# Patient Record
Sex: FEMALE | Race: WHITE | Employment: FULL TIME | URBAN - METROPOLITAN AREA
[De-identification: names, ages, dates, MRNs, and addresses within clinical notes are randomized per-mention and may not be internally consistent; named-entity substitution may affect disease eponyms.]

---

## 2017-01-23 RX ORDER — LISINOPRIL AND HYDROCHLOROTHIAZIDE 25; 20 MG/1; MG/1
1 TABLET ORAL DAILY
Qty: 90 TABLET | Refills: 1 | Status: SHIPPED | OUTPATIENT
Start: 2017-01-23 | End: 2017-01-30 | Stop reason: SDUPTHER

## 2017-01-30 DIAGNOSIS — K21.9 GASTROESOPHAGEAL REFLUX DISEASE, ESOPHAGITIS PRESENCE NOT SPECIFIED: ICD-10-CM

## 2017-01-31 RX ORDER — LISINOPRIL AND HYDROCHLOROTHIAZIDE 25; 20 MG/1; MG/1
1 TABLET ORAL DAILY
Qty: 90 TABLET | Refills: 1 | Status: SHIPPED | OUTPATIENT
Start: 2017-01-31 | End: 2017-08-02 | Stop reason: SDUPTHER

## 2017-01-31 RX ORDER — OMEPRAZOLE 20 MG/1
20 CAPSULE, DELAYED RELEASE ORAL
Qty: 90 CAPSULE | Refills: 1 | Status: SHIPPED | OUTPATIENT
Start: 2017-01-31 | End: 2017-06-13 | Stop reason: ALTCHOICE

## 2017-03-16 RX ORDER — GABAPENTIN 400 MG/1
400 CAPSULE ORAL 3 TIMES DAILY
Qty: 270 CAPSULE | Refills: 2 | Status: SHIPPED | OUTPATIENT
Start: 2017-03-16 | End: 2018-01-15 | Stop reason: SDUPTHER

## 2017-06-13 ENCOUNTER — OFFICE VISIT (OUTPATIENT)
Dept: FAMILY MEDICINE CLINIC | Age: 57
End: 2017-06-13
Payer: COMMERCIAL

## 2017-06-13 VITALS — DIASTOLIC BLOOD PRESSURE: 78 MMHG | HEART RATE: 84 BPM | HEIGHT: 67 IN | SYSTOLIC BLOOD PRESSURE: 132 MMHG

## 2017-06-13 DIAGNOSIS — M79.7 FIBROMYALGIA: ICD-10-CM

## 2017-06-13 DIAGNOSIS — I10 ESSENTIAL HYPERTENSION: Primary | ICD-10-CM

## 2017-06-13 PROCEDURE — 99213 OFFICE O/P EST LOW 20 MIN: CPT | Performed by: FAMILY MEDICINE

## 2017-06-13 PROCEDURE — 3017F COLORECTAL CA SCREEN DOC REV: CPT | Performed by: FAMILY MEDICINE

## 2017-06-13 PROCEDURE — 1036F TOBACCO NON-USER: CPT | Performed by: FAMILY MEDICINE

## 2017-06-13 PROCEDURE — G8421 BMI NOT CALCULATED: HCPCS | Performed by: FAMILY MEDICINE

## 2017-06-13 PROCEDURE — G8427 DOCREV CUR MEDS BY ELIG CLIN: HCPCS | Performed by: FAMILY MEDICINE

## 2017-06-13 PROCEDURE — 3014F SCREEN MAMMO DOC REV: CPT | Performed by: FAMILY MEDICINE

## 2017-06-13 RX ORDER — TOBRAMYCIN AND DEXAMETHASONE 3; 1 MG/ML; MG/ML
SUSPENSION/ DROPS OPHTHALMIC
Refills: 0 | COMMUNITY
Start: 2017-05-09 | End: 2017-12-19 | Stop reason: ALTCHOICE

## 2017-06-13 RX ORDER — OMEPRAZOLE 20 MG/1
20 CAPSULE, DELAYED RELEASE ORAL DAILY
COMMUNITY
End: 2017-08-02 | Stop reason: SDUPTHER

## 2017-06-13 ASSESSMENT — ENCOUNTER SYMPTOMS
SHORTNESS OF BREATH: 1
DIARRHEA: 0
BACK PAIN: 1
RHINORRHEA: 0
CONSTIPATION: 0
SORE THROAT: 0
ABDOMINAL PAIN: 1
COUGH: 0

## 2017-08-03 RX ORDER — LISINOPRIL AND HYDROCHLOROTHIAZIDE 25; 20 MG/1; MG/1
1 TABLET ORAL DAILY
Qty: 90 TABLET | Refills: 1 | Status: SHIPPED | OUTPATIENT
Start: 2017-08-03 | End: 2018-02-07 | Stop reason: SDUPTHER

## 2017-08-03 RX ORDER — OMEPRAZOLE 20 MG/1
20 CAPSULE, DELAYED RELEASE ORAL DAILY
Qty: 90 CAPSULE | Refills: 1 | Status: SHIPPED | OUTPATIENT
Start: 2017-08-03 | End: 2018-02-15 | Stop reason: SDUPTHER

## 2017-08-17 RX ORDER — AMITRIPTYLINE HYDROCHLORIDE 75 MG/1
75 TABLET, FILM COATED ORAL NIGHTLY
Qty: 90 TABLET | Refills: 0 | Status: SHIPPED | OUTPATIENT
Start: 2017-08-17 | End: 2017-12-19 | Stop reason: DRUGHIGH

## 2017-12-19 ENCOUNTER — OFFICE VISIT (OUTPATIENT)
Dept: FAMILY MEDICINE CLINIC | Age: 57
End: 2017-12-19
Payer: COMMERCIAL

## 2017-12-19 VITALS — DIASTOLIC BLOOD PRESSURE: 86 MMHG | HEART RATE: 82 BPM | OXYGEN SATURATION: 96 % | SYSTOLIC BLOOD PRESSURE: 136 MMHG

## 2017-12-19 DIAGNOSIS — T14.8XXA SUPERFICIAL HEMATOMA: ICD-10-CM

## 2017-12-19 DIAGNOSIS — I10 ESSENTIAL HYPERTENSION: Primary | ICD-10-CM

## 2017-12-19 DIAGNOSIS — M79.7 FIBROMYALGIA: ICD-10-CM

## 2017-12-19 PROCEDURE — G8427 DOCREV CUR MEDS BY ELIG CLIN: HCPCS | Performed by: FAMILY MEDICINE

## 2017-12-19 PROCEDURE — 99213 OFFICE O/P EST LOW 20 MIN: CPT | Performed by: FAMILY MEDICINE

## 2017-12-19 PROCEDURE — 3017F COLORECTAL CA SCREEN DOC REV: CPT | Performed by: FAMILY MEDICINE

## 2017-12-19 PROCEDURE — 3014F SCREEN MAMMO DOC REV: CPT | Performed by: FAMILY MEDICINE

## 2017-12-19 PROCEDURE — G8484 FLU IMMUNIZE NO ADMIN: HCPCS | Performed by: FAMILY MEDICINE

## 2017-12-19 PROCEDURE — G8421 BMI NOT CALCULATED: HCPCS | Performed by: FAMILY MEDICINE

## 2017-12-19 PROCEDURE — 1036F TOBACCO NON-USER: CPT | Performed by: FAMILY MEDICINE

## 2017-12-19 RX ORDER — CLINDAMYCIN HYDROCHLORIDE 150 MG/1
CAPSULE ORAL
Refills: 0 | COMMUNITY
Start: 2017-10-12 | End: 2018-04-30 | Stop reason: ALTCHOICE

## 2017-12-19 RX ORDER — AMITRIPTYLINE HYDROCHLORIDE 100 MG/1
100 TABLET, FILM COATED ORAL NIGHTLY
Qty: 90 TABLET | Refills: 1 | Status: SHIPPED | OUTPATIENT
Start: 2017-12-19 | End: 2018-05-17 | Stop reason: SDUPTHER

## 2017-12-19 ASSESSMENT — ENCOUNTER SYMPTOMS
SHORTNESS OF BREATH: 1
ABDOMINAL PAIN: 0
NAUSEA: 0
DIARRHEA: 0
SORE THROAT: 0
CONSTIPATION: 0
RHINORRHEA: 0
BACK PAIN: 1
COUGH: 0

## 2017-12-19 NOTE — PROGRESS NOTES
peña with judy Caicedo received counseling on the following healthy behaviors: medication adherence  Reviewed prior labs and health maintenance  Continue current medications, diet and exercise. Discussed use, benefit, and side effects of prescribed medications. Barriers to medication compliance addressed. Patient given educational materials - see patient instructions  Was a self-tracking handout given in paper form or via [employer]? No:     Requested Prescriptions     Signed Prescriptions Disp Refills    amitriptyline (ELAVIL) 100 MG tablet 90 tablet 1     Sig: Take 1 tablet by mouth nightly       All patient questions answered. Patient voiced understanding. Quality Measures    There is no height or weight on file to calculate BMI. Elevated. Weight control planned discussed Healthy diet and regular exercise. BP: 136/86 Blood pressure is normal. Treatment plan consists of No treatment change needed.     Lab Results   Component Value Date    LDLCHOLESTEROL 110 06/12/2015    (goal LDL reduction with dx if diabetes is 50% LDL reduction)      PHQ Scores 12/6/2016   PHQ2 Score 2   PHQ9 Score 2     Interpretation of Total Score Depression Severity: 1-4 = Minimal depression, 5-9 = Mild depression, 10-14 = Moderate depression, 15-19 = Moderately severe depression, 20-27 = Severe depression

## 2018-01-15 RX ORDER — GABAPENTIN 400 MG/1
400 CAPSULE ORAL 3 TIMES DAILY
Qty: 270 CAPSULE | Refills: 2 | Status: SHIPPED | OUTPATIENT
Start: 2018-01-15 | End: 2018-01-16 | Stop reason: SDUPTHER

## 2018-01-17 RX ORDER — GABAPENTIN 400 MG/1
400 CAPSULE ORAL 3 TIMES DAILY
Qty: 270 CAPSULE | Refills: 2 | Status: SHIPPED | OUTPATIENT
Start: 2018-01-17 | End: 2018-11-12 | Stop reason: SDUPTHER

## 2018-02-07 RX ORDER — LISINOPRIL AND HYDROCHLOROTHIAZIDE 25; 20 MG/1; MG/1
1 TABLET ORAL DAILY
Qty: 90 TABLET | Refills: 1 | Status: SHIPPED | OUTPATIENT
Start: 2018-02-07 | End: 2018-07-05 | Stop reason: SDUPTHER

## 2018-02-07 NOTE — TELEPHONE ENCOUNTER
Lisinopril 20-25 mg    Mail order to [facility]    Next Visit Date:  Future Appointments  Date Time Provider Kya Trimble   7/5/2018 1:30 PM [facility]   Topic Date Due    DTaP/Tdap/Td vaccine (1 - Tdap) 04/15/1979    Potassium monitoring  06/06/2017    Creatinine monitoring  06/06/2017    Cervical cancer screen  09/15/2017    Flu vaccine (1) 12/18/2018 (Originally 9/1/2017)    Breast cancer screen  05/10/2018    Lipid screen  06/12/2020    Colon cancer screen colonoscopy  04/05/2022    Hepatitis C screen  Addressed    HIV screen  Addressed       Hemoglobin A1C (%)   Date Value   01/04/2016 5.4             ( goal A1C is < 7)   No results found for: LABMICR  LDL Cholesterol (mg/dL)   Date Value   06/12/2015 110       (goal LDL is <100)   AST (U/L)   Date Value   06/06/2016 22.0     ALT (U/L)   Date Value   06/06/2016 37.0     BUN (mg/dL)   Date Value   06/06/2016 25.0     BP Readings from Last 3 Encounters:   12/19/17 136/86   06/13/17 132/78   12/06/16 136/82          (goal 120/80)    All Future Testing planned in [facility]  Lab Frequency Next Occurrence               Patient Active Problem List:     Fibromyalgia     HTN (hypertension)     Follicular adenoma of thyroid gland     S/P laparoscopic cholecystectomy     Lower urinary tract infectious disease     Right hip pain     Bilateral leg edema     Chronic fatigue     Left thyroid nodule     Chronic back pain greater than 3 months duration     Shortness of breath     Pulmonary hypertension associated with unclear multi-factorial mechanisms     Morbid obesity due to excess calories (HCC)     MAYURI (obstructive sleep apnea)     Essential hypertension     Coxitis     History of total hip replacement

## 2018-02-15 RX ORDER — OMEPRAZOLE 20 MG/1
20 CAPSULE, DELAYED RELEASE ORAL DAILY
Qty: 90 CAPSULE | Refills: 1 | Status: SHIPPED | OUTPATIENT
Start: 2018-02-15 | End: 2018-07-05 | Stop reason: SDUPTHER

## 2018-02-15 NOTE — TELEPHONE ENCOUNTER
Patient asking for a new script for Omeprazole - patient uses [place] Maintenance   Topic Date Due    DTaP/Tdap/Td vaccine (1 - Tdap) 04/15/1979    Potassium monitoring  06/06/2017    Creatinine monitoring  06/06/2017    Cervical cancer screen  09/15/2017    Flu vaccine (1) 12/18/2018 (Originally 9/1/2017)    Breast cancer screen  05/10/2018    Lipid screen  06/12/2020    Colon cancer screen colonoscopy  04/05/2022    Hepatitis C screen  Addressed    HIV screen  Addressed             (applicable per patient's age: Cancer Screenings, Depression Screening, Fall Risk Screening, Immunizations)    Hemoglobin A1C (%)   Date Value   01/04/2016 5.4     LDL Cholesterol (mg/dL)   Date Value   06/12/2015 110     AST (U/L)   Date Value   06/06/2016 22.0     ALT (U/L)   Date Value   06/06/2016 37.0     BUN (mg/dL)   Date Value   06/06/2016 25.0      (goal A1C is < 7)   (goal LDL is <100) need 30-50% reduction from baseline     BP Readings from Last 3 Encounters:   12/19/17 136/86   06/13/17 132/78   12/06/16 136/82    (goal /80)      All Future Testing planned in [facility]:  Lab Frequency Next Occurrence       Next Visit Date:  Future Appointments  Date Time Provider Kya Trimble   7/5/2018 1:30 PM DO JOSAFAT [facility]            Patient Active Problem List:     Fibromyalgia     HTN (hypertension)     Follicular adenoma of thyroid gland     S/P laparoscopic cholecystectomy     Lower urinary tract infectious disease     Right hip pain     Bilateral leg edema     Chronic fatigue     Left thyroid nodule     Chronic back pain greater than 3 months duration     Shortness of breath     Pulmonary hypertension associated with unclear multi-factorial mechanisms     Morbid obesity due to excess calories (HCC)     MAYURI (obstructive sleep apnea)     Essential hypertension     Coxitis     History of total hip replacement

## 2018-04-30 ENCOUNTER — OFFICE VISIT (OUTPATIENT)
Dept: FAMILY MEDICINE CLINIC | Age: 58
End: 2018-04-30
Payer: COMMERCIAL

## 2018-04-30 ENCOUNTER — HOSPITAL ENCOUNTER (OUTPATIENT)
Age: 58
Discharge: HOME | End: 2018-04-30
Payer: COMMERCIAL

## 2018-04-30 VITALS
HEART RATE: 68 BPM | OXYGEN SATURATION: 98 % | DIASTOLIC BLOOD PRESSURE: 70 MMHG | SYSTOLIC BLOOD PRESSURE: 110 MMHG | TEMPERATURE: 97.6 F

## 2018-04-30 DIAGNOSIS — R00.2 HEART PALPITATIONS: ICD-10-CM

## 2018-04-30 DIAGNOSIS — R00.2 HEART PALPITATIONS: Primary | ICD-10-CM

## 2018-04-30 LAB
ABSOLUTE EOS #: 0 K/UL (ref 0–0.4)
ABSOLUTE IMMATURE GRANULOCYTE: ABNORMAL K/UL (ref 0–0.3)
ABSOLUTE LYMPH #: 1.7 K/UL (ref 1–4.8)
ABSOLUTE MONO #: 0.6 K/UL (ref 0–1)
ALBUMIN SERPL-MCNC: 3.9 G/DL (ref 3.5–5.2)
ALBUMIN/GLOBULIN RATIO: ABNORMAL (ref 1–2.5)
ALP BLD-CCNC: 113 U/L (ref 35–104)
ALT SERPL-CCNC: 17 U/L (ref 5–33)
ANION GAP SERPL CALCULATED.3IONS-SCNC: 12 MMOL/L (ref 9–17)
AST SERPL-CCNC: 19 U/L
BASOPHILS # BLD: 0 % (ref 0–2)
BASOPHILS ABSOLUTE: 0 K/UL (ref 0–0.2)
BILIRUB SERPL-MCNC: 0.2 MG/DL (ref 0.3–1.2)
BUN BLDV-MCNC: 17 MG/DL (ref 6–20)
BUN/CREAT BLD: 18 (ref 9–20)
CALCIUM SERPL-MCNC: 9.4 MG/DL (ref 8.6–10.4)
CHLORIDE BLD-SCNC: 99 MMOL/L (ref 98–107)
CO2: 27 MMOL/L (ref 20–31)
CREAT SERPL-MCNC: 0.96 MG/DL (ref 0.5–0.9)
DIFFERENTIAL TYPE: YES
EOSINOPHILS RELATIVE PERCENT: 0 % (ref 0–5)
GFR AFRICAN AMERICAN: >60 ML/MIN
GFR NON-AFRICAN AMERICAN: 60 ML/MIN
GFR SERPL CREATININE-BSD FRML MDRD: ABNORMAL ML/MIN/{1.73_M2}
GFR SERPL CREATININE-BSD FRML MDRD: ABNORMAL ML/MIN/{1.73_M2}
GLUCOSE BLD-MCNC: 100 MG/DL (ref 70–99)
HCT VFR BLD CALC: 38.8 % (ref 36–46)
HEMOGLOBIN: 12.7 G/DL (ref 12–16)
IMMATURE GRANULOCYTES: ABNORMAL %
LYMPHOCYTES # BLD: 24 % (ref 15–40)
MCH RBC QN AUTO: 26.7 PG (ref 26–34)
MCHC RBC AUTO-ENTMCNC: 32.7 G/DL (ref 31–37)
MCV RBC AUTO: 81.7 FL (ref 80–100)
MONOCYTES # BLD: 9 % (ref 4–8)
NRBC AUTOMATED: ABNORMAL PER 100 WBC
PDW BLD-RTO: 16.5 % (ref 12.1–15.2)
PLATELET # BLD: 298 K/UL (ref 140–450)
PLATELET ESTIMATE: ABNORMAL
PMV BLD AUTO: ABNORMAL FL (ref 6–12)
POTASSIUM SERPL-SCNC: 4.3 MMOL/L (ref 3.7–5.3)
RBC # BLD: 4.75 M/UL (ref 4–5.2)
RBC # BLD: ABNORMAL 10*6/UL
SEG NEUTROPHILS: 67 % (ref 47–75)
SEGMENTED NEUTROPHILS ABSOLUTE COUNT: 4.8 K/UL (ref 2.5–7)
SODIUM BLD-SCNC: 138 MMOL/L (ref 135–144)
TOTAL PROTEIN: 6.8 G/DL (ref 6.4–8.3)
TSH SERPL DL<=0.05 MIU/L-ACNC: 2.21 MIU/L (ref 0.3–5)
WBC # BLD: 7.2 K/UL (ref 3.5–11)
WBC # BLD: ABNORMAL 10*3/UL

## 2018-04-30 PROCEDURE — 84443 ASSAY THYROID STIM HORMONE: CPT

## 2018-04-30 PROCEDURE — 85025 COMPLETE CBC W/AUTO DIFF WBC: CPT

## 2018-04-30 PROCEDURE — 99213 OFFICE O/P EST LOW 20 MIN: CPT | Performed by: NURSE PRACTITIONER

## 2018-04-30 PROCEDURE — G8427 DOCREV CUR MEDS BY ELIG CLIN: HCPCS | Performed by: NURSE PRACTITIONER

## 2018-04-30 PROCEDURE — 80053 COMPREHEN METABOLIC PANEL: CPT

## 2018-04-30 PROCEDURE — 36415 COLL VENOUS BLD VENIPUNCTURE: CPT

## 2018-04-30 PROCEDURE — 1036F TOBACCO NON-USER: CPT | Performed by: NURSE PRACTITIONER

## 2018-04-30 PROCEDURE — 3017F COLORECTAL CA SCREEN DOC REV: CPT | Performed by: NURSE PRACTITIONER

## 2018-04-30 PROCEDURE — G8421 BMI NOT CALCULATED: HCPCS | Performed by: NURSE PRACTITIONER

## 2018-04-30 ASSESSMENT — ENCOUNTER SYMPTOMS
VOMITING: 0
DIARRHEA: 0
SHORTNESS OF BREATH: 0
NAUSEA: 0
COUGH: 0

## 2018-04-30 ASSESSMENT — PATIENT HEALTH QUESTIONNAIRE - PHQ9
1. LITTLE INTEREST OR PLEASURE IN DOING THINGS: 0
2. FEELING DOWN, DEPRESSED OR HOPELESS: 0
SUM OF ALL RESPONSES TO PHQ9 QUESTIONS 1 & 2: 0
SUM OF ALL RESPONSES TO PHQ QUESTIONS 1-9: 0

## 2018-05-03 ENCOUNTER — HOSPITAL ENCOUNTER (OUTPATIENT)
Dept: NON INVASIVE DIAGNOSTICS | Age: 58
Discharge: HOME | End: 2018-05-03
Payer: COMMERCIAL

## 2018-05-03 DIAGNOSIS — R00.2 HEART PALPITATIONS: ICD-10-CM

## 2018-05-03 PROCEDURE — 93270 REMOTE 30 DAY ECG REV/REPORT: CPT

## 2018-05-17 ENCOUNTER — TELEPHONE (OUTPATIENT)
Dept: FAMILY MEDICINE CLINIC | Age: 58
End: 2018-05-17

## 2018-05-17 RX ORDER — AMITRIPTYLINE HYDROCHLORIDE 100 MG/1
100 TABLET, FILM COATED ORAL NIGHTLY
Qty: 90 TABLET | Refills: 0 | Status: SHIPPED | OUTPATIENT
Start: 2018-05-17 | End: 2018-07-05 | Stop reason: SDUPTHER

## 2018-05-18 LAB
BUN BLDV-MCNC: 17 MG/DL
CALCIUM SERPL-MCNC: 9.3 MG/DL
CHLORIDE BLD-SCNC: 101 MMOL/L
CHOLESTEROL, TOTAL: 190 MG/DL
CHOLESTEROL/HDL RATIO: 3.7
CO2: 27.1 MMOL/L
CREAT SERPL-MCNC: 1.09 MG/DL
GFR CALCULATED: >60
GLUCOSE BLD-MCNC: 100 MG/DL
HDLC SERPL-MCNC: 52 MG/DL (ref 35–70)
LDL CHOLESTEROL CALCULATED: 113 MG/DL (ref 0–160)
POTASSIUM SERPL-SCNC: 4.6 MMOL/L
SODIUM BLD-SCNC: 139 MMOL/L
TRIGL SERPL-MCNC: 127 MG/DL
VLDLC SERPL CALC-MCNC: 25 MG/DL

## 2018-07-05 ENCOUNTER — OFFICE VISIT (OUTPATIENT)
Dept: FAMILY MEDICINE CLINIC | Age: 58
End: 2018-07-05
Payer: COMMERCIAL

## 2018-07-05 VITALS
DIASTOLIC BLOOD PRESSURE: 80 MMHG | SYSTOLIC BLOOD PRESSURE: 114 MMHG | HEART RATE: 88 BPM | OXYGEN SATURATION: 97 % | TEMPERATURE: 97.7 F

## 2018-07-05 DIAGNOSIS — K21.9 GASTROESOPHAGEAL REFLUX DISEASE WITHOUT ESOPHAGITIS: ICD-10-CM

## 2018-07-05 DIAGNOSIS — M79.7 FIBROMYALGIA: ICD-10-CM

## 2018-07-05 DIAGNOSIS — I10 ESSENTIAL HYPERTENSION: Primary | ICD-10-CM

## 2018-07-05 PROCEDURE — 99214 OFFICE O/P EST MOD 30 MIN: CPT | Performed by: NURSE PRACTITIONER

## 2018-07-05 PROCEDURE — 1036F TOBACCO NON-USER: CPT | Performed by: NURSE PRACTITIONER

## 2018-07-05 PROCEDURE — G8427 DOCREV CUR MEDS BY ELIG CLIN: HCPCS | Performed by: NURSE PRACTITIONER

## 2018-07-05 PROCEDURE — G8421 BMI NOT CALCULATED: HCPCS | Performed by: NURSE PRACTITIONER

## 2018-07-05 PROCEDURE — 3017F COLORECTAL CA SCREEN DOC REV: CPT | Performed by: NURSE PRACTITIONER

## 2018-07-05 RX ORDER — OMEPRAZOLE 20 MG/1
20 CAPSULE, DELAYED RELEASE ORAL DAILY
Qty: 90 CAPSULE | Refills: 1 | Status: SHIPPED | OUTPATIENT
Start: 2018-07-05 | End: 2019-01-03 | Stop reason: SDUPTHER

## 2018-07-05 RX ORDER — LISINOPRIL AND HYDROCHLOROTHIAZIDE 25; 20 MG/1; MG/1
1 TABLET ORAL DAILY
Qty: 90 TABLET | Refills: 1 | Status: SHIPPED | OUTPATIENT
Start: 2018-07-05 | End: 2019-01-03 | Stop reason: SDUPTHER

## 2018-07-05 RX ORDER — AMITRIPTYLINE HYDROCHLORIDE 100 MG/1
100 TABLET, FILM COATED ORAL NIGHTLY
Qty: 90 TABLET | Refills: 1 | Status: SHIPPED | OUTPATIENT
Start: 2018-07-05 | End: 2019-01-03 | Stop reason: SDUPTHER

## 2018-07-05 RX ORDER — MELOXICAM 15 MG/1
15 TABLET ORAL DAILY
Qty: 30 TABLET | Refills: 3 | Status: SHIPPED | OUTPATIENT
Start: 2018-07-05 | End: 2019-04-11 | Stop reason: ALTCHOICE

## 2018-07-05 ASSESSMENT — ENCOUNTER SYMPTOMS
HEARTBURN: 0
BLURRED VISION: 0
SHORTNESS OF BREATH: 1
ABDOMINAL PAIN: 0
SORE THROAT: 0

## 2018-07-05 NOTE — PROGRESS NOTES
HPI Notes    Name: Ashlee Agudelo  : 1960         Chief Complaint:     Chief Complaint   Patient presents with    Hypertension     Patient here for check up    Muscle Pain     Patient here today for fibromyalgia    Gastroesophageal Reflux       History of Present Illness:        Hypertension   This is a chronic problem. The current episode started more than 1 year ago. The problem has been gradually improving since onset. The problem is controlled. Associated symptoms include peripheral edema and shortness of breath. Pertinent negatives include no blurred vision or chest pain. Risk factors for coronary artery disease include dyslipidemia, obesity, post-menopausal state and sedentary lifestyle. Past treatments include ACE inhibitors and diuretics. Compliance problems include exercise and diet. Muscle Pain   This is a chronic problem. The current episode started more than 1 year ago. The problem occurs daily. The problem has been waxing and waning since onset. The pain is present in the left lower leg, left upper leg, right lower leg and right upper leg. The pain is medium. The symptoms are aggravated by any movement. Associated symptoms include fatigue, stiffness and shortness of breath. Pertinent negatives include no abdominal pain or chest pain. Treatments tried: gabapentin. The treatment provided mild relief. Gastroesophageal Reflux   She reports no abdominal pain, no chest pain, no heartburn or no sore throat. The current episode started more than 1 year ago. The problem has been gradually improving. The symptoms are aggravated by certain foods. Associated symptoms include fatigue. Risk factors include caffeine use, lack of exercise and obesity. She has tried a PPI for the symptoms. The treatment provided significant relief.        Past Medical History:     Past Medical History:   Diagnosis Date    Acid reflux     Bleeding tendency ([address]     Blood type O+     Bronchitis     Chronic back pain Disease Brother     Atrial Fibrillation Brother     Atrial Fibrillation Mother        Review of Systems:         Review of Systems   Constitutional: Positive for fatigue. HENT: Negative for sore throat. Eyes: Negative for blurred vision. Respiratory: Positive for shortness of breath. Cardiovascular: Negative for chest pain. Gastrointestinal: Negative for abdominal pain and heartburn. Musculoskeletal: Positive for stiffness. Physical Exam:     Vitals:  /80   Pulse 88   Temp 97.7 °F (36.5 °C) (Oral)   SpO2 97%       Physical Exam   Constitutional: She is oriented to person, place, and time. Vital signs are normal. She appears well-developed and well-nourished. HENT:   Head: Normocephalic. Right Ear: Hearing, tympanic membrane and external ear normal.   Left Ear: Hearing, tympanic membrane and external ear normal.   Nose: Nose normal.   Mouth/Throat: Uvula is midline, oropharynx is clear and moist and mucous membranes are normal.   Eyes: Conjunctivae and EOM are normal. Pupils are equal, round, and reactive to light. Neck: Trachea normal and normal range of motion. Carotid bruit is not present. No thyroid mass present. Cardiovascular: Normal rate, regular rhythm, S1 normal, S2 normal, normal heart sounds and intact distal pulses. Pulses:       Dorsalis pedis pulses are 2+ on the right side, and 2+ on the left side. 2+ BLE edema   Pulmonary/Chest: Effort normal and breath sounds normal.   Abdominal: Soft. Normal appearance. There is no tenderness. Musculoskeletal: Normal range of motion. Neurological: She is alert and oriented to person, place, and time. She has normal strength and normal reflexes. GCS eye subscore is 4. GCS verbal subscore is 5. GCS motor subscore is 6. Skin: Skin is warm, dry and intact. No rash noted. Psychiatric: She has a normal mood and affect.  Her speech is normal and behavior is normal. Judgment and thought content normal. Cognition and memory are normal.   Nursing note and vitals reviewed. Data:     Lab Results   Component Value Date     05/18/2018    K 4.6 05/18/2018     05/18/2018    CO2 27.1 05/18/2018    BUN 17 05/18/2018    CREATININE 1.09 05/18/2018    GLUCOSE 100 05/18/2018    PROT 6.8 04/30/2018    LABALBU 3.9 04/30/2018    BILITOT 0.20 04/30/2018    ALKPHOS 113 04/30/2018    AST 19 04/30/2018    ALT 17 04/30/2018     Lab Results   Component Value Date    WBC 7.2 04/30/2018    RBC 4.75 04/30/2018    RBC 4.17 05/15/2012    HGB 12.7 04/30/2018    HCT 38.8 04/30/2018    MCV 81.7 04/30/2018    MCH 26.7 04/30/2018    MCHC 32.7 04/30/2018    RDW 16.5 04/30/2018     04/30/2018     05/15/2012    MPV NOT REPORTED 04/30/2018     Lab Results   Component Value Date    TSH 2.21 04/30/2018     Lab Results   Component Value Date    CHOL 190 05/18/2018    HDL 52 05/18/2018    LABA1C 5.4 01/04/2016          Assessment & Plan        Diagnosis Orders   1. Essential hypertension  --well controlled at this time. Continue current meds     2. Fibromyalgia   --pt having some increased pain in multiple muscles. Pt encouraged to do some exercise to decrease pain, especially water exercises. 3. Gastroesophageal reflux disease without esophagitis   --well controlled at this time. Continue to modify diet and identify food triggers      Patient verbalizes understanding and agreement with plan. All questions answered. If symptoms do not resolve or worsen, return to office.      Face to face time > 25 minutes    Greater than 50% of time was spent counseling pt regarding disease process and medications                  Completed Refills   Requested Prescriptions     Signed Prescriptions Disp Refills    lisinopril-hydrochlorothiazide (ZESTORETIC) 20-25 MG per tablet 90 tablet 1     Sig: Take 1 tablet by mouth daily    omeprazole (PRILOSEC) 20 MG delayed release capsule 90 capsule 1     Sig: Take 1 capsule by mouth daily    amitriptyline (ELAVIL) 100 MG tablet 90 tablet 1     Sig: Take 1 tablet by mouth nightly    meloxicam (MOBIC) 15 MG tablet 30 tablet 3     Sig: Take 1 tablet by mouth daily     Return in about 6 months (around 1/5/2019) for HTN. Orders Placed This Encounter   Medications    lisinopril-hydrochlorothiazide (ZESTORETIC) 20-25 MG per tablet     Sig: Take 1 tablet by mouth daily     Dispense:  90 tablet     Refill:  1    omeprazole (PRILOSEC) 20 MG delayed release capsule     Sig: Take 1 capsule by mouth daily     Dispense:  90 capsule     Refill:  1    amitriptyline (ELAVIL) 100 MG tablet     Sig: Take 1 tablet by mouth nightly     Dispense:  90 tablet     Refill:  1    meloxicam (MOBIC) 15 MG tablet     Sig: Take 1 tablet by mouth daily     Dispense:  30 tablet     Refill:  3     No orders of the defined types were placed in this encounter. Patient Instructions     SURVEY:    You may be receiving a survey from [employer] regarding your visit today. Please complete the survey to enable us to provide the highest quality of care to you and your family. If you cannot score us a very good on any question, please call the office to discuss how we could have made your experience a very good one. Thank you.       Electronically signed by ASHLEY Chau CNP on 7/5/2018 at 5:45 PM           Completed Refills   Requested Prescriptions     Signed Prescriptions Disp Refills    lisinopril-hydrochlorothiazide (ZESTORETIC) 20-25 MG per tablet 90 tablet 1     Sig: Take 1 tablet by mouth daily    omeprazole (PRILOSEC) 20 MG delayed release capsule 90 capsule 1     Sig: Take 1 capsule by mouth daily    amitriptyline (ELAVIL) 100 MG tablet 90 tablet 1     Sig: Take 1 tablet by mouth nightly    meloxicam (MOBIC) 15 MG tablet 30 tablet 3     Sig: Take 1 tablet by mouth daily         Marifer received counseling on the following healthy behaviors: exercise and medication adherence  Reviewed prior labs and

## 2018-11-12 RX ORDER — GABAPENTIN 400 MG/1
400 CAPSULE ORAL 3 TIMES DAILY
Qty: 270 CAPSULE | Refills: 2 | Status: SHIPPED | OUTPATIENT
Start: 2018-11-12 | End: 2019-01-03 | Stop reason: SDUPTHER

## 2018-11-12 RX ORDER — GABAPENTIN 400 MG/1
CAPSULE ORAL
Qty: 90 CAPSULE | Refills: 0 | Status: SHIPPED | OUTPATIENT
Start: 2018-11-12 | End: 2019-01-03

## 2019-01-03 ENCOUNTER — HOSPITAL ENCOUNTER (OUTPATIENT)
Age: 59
Discharge: HOME | End: 2019-01-03
Payer: COMMERCIAL

## 2019-01-03 ENCOUNTER — OFFICE VISIT (OUTPATIENT)
Dept: FAMILY MEDICINE CLINIC | Age: 59
End: 2019-01-03
Payer: COMMERCIAL

## 2019-01-03 VITALS
TEMPERATURE: 97.6 F | DIASTOLIC BLOOD PRESSURE: 70 MMHG | OXYGEN SATURATION: 97 % | SYSTOLIC BLOOD PRESSURE: 118 MMHG | HEART RATE: 94 BPM

## 2019-01-03 DIAGNOSIS — R60.0 BILATERAL LOWER EXTREMITY EDEMA: ICD-10-CM

## 2019-01-03 DIAGNOSIS — M13.0 POLYARTHRITIS: ICD-10-CM

## 2019-01-03 DIAGNOSIS — M79.7 FIBROMYALGIA: ICD-10-CM

## 2019-01-03 DIAGNOSIS — E66.01 MORBID OBESITY DUE TO EXCESS CALORIES (HCC): ICD-10-CM

## 2019-01-03 DIAGNOSIS — R00.2 HEART PALPITATIONS: ICD-10-CM

## 2019-01-03 DIAGNOSIS — I10 ESSENTIAL HYPERTENSION: Primary | ICD-10-CM

## 2019-01-03 LAB — D-DIMER QUANTITATIVE: 1.74 MG/L FEU (ref 0–0.5)

## 2019-01-03 PROCEDURE — G8484 FLU IMMUNIZE NO ADMIN: HCPCS | Performed by: NURSE PRACTITIONER

## 2019-01-03 PROCEDURE — G8427 DOCREV CUR MEDS BY ELIG CLIN: HCPCS | Performed by: NURSE PRACTITIONER

## 2019-01-03 PROCEDURE — 3017F COLORECTAL CA SCREEN DOC REV: CPT | Performed by: NURSE PRACTITIONER

## 2019-01-03 PROCEDURE — 99214 OFFICE O/P EST MOD 30 MIN: CPT | Performed by: NURSE PRACTITIONER

## 2019-01-03 PROCEDURE — 85379 FIBRIN DEGRADATION QUANT: CPT

## 2019-01-03 PROCEDURE — 1036F TOBACCO NON-USER: CPT | Performed by: NURSE PRACTITIONER

## 2019-01-03 PROCEDURE — G8421 BMI NOT CALCULATED: HCPCS | Performed by: NURSE PRACTITIONER

## 2019-01-03 PROCEDURE — 36415 COLL VENOUS BLD VENIPUNCTURE: CPT

## 2019-01-03 RX ORDER — GABAPENTIN 400 MG/1
400 CAPSULE ORAL 4 TIMES DAILY
Qty: 360 CAPSULE | Refills: 0 | Status: SHIPPED | OUTPATIENT
Start: 2019-01-03 | End: 2019-04-11 | Stop reason: SDUPTHER

## 2019-01-03 RX ORDER — OMEPRAZOLE 20 MG/1
20 CAPSULE, DELAYED RELEASE ORAL DAILY
Qty: 90 CAPSULE | Refills: 1 | Status: SHIPPED | OUTPATIENT
Start: 2019-01-03 | End: 2019-08-12 | Stop reason: SDUPTHER

## 2019-01-03 RX ORDER — LISINOPRIL AND HYDROCHLOROTHIAZIDE 25; 20 MG/1; MG/1
1 TABLET ORAL DAILY
Qty: 90 TABLET | Refills: 1 | Status: SHIPPED | OUTPATIENT
Start: 2019-01-03 | End: 2019-08-12 | Stop reason: SDUPTHER

## 2019-01-03 RX ORDER — AMITRIPTYLINE HYDROCHLORIDE 100 MG/1
100 TABLET, FILM COATED ORAL NIGHTLY
Qty: 90 TABLET | Refills: 1 | Status: SHIPPED | OUTPATIENT
Start: 2019-01-03 | End: 2019-08-27 | Stop reason: SDUPTHER

## 2019-01-03 ASSESSMENT — ENCOUNTER SYMPTOMS
VOMITING: 0
ORTHOPNEA: 0
NAUSEA: 0
COUGH: 0
SHORTNESS OF BREATH: 0
DIARRHEA: 0

## 2019-02-18 ENCOUNTER — OFFICE VISIT (OUTPATIENT)
Dept: CARDIOLOGY CLINIC | Age: 59
End: 2019-02-18
Payer: COMMERCIAL

## 2019-02-18 VITALS — SYSTOLIC BLOOD PRESSURE: 140 MMHG | DIASTOLIC BLOOD PRESSURE: 70 MMHG | OXYGEN SATURATION: 98 % | HEART RATE: 100 BPM

## 2019-02-18 DIAGNOSIS — R00.2 PALPITATIONS: Primary | ICD-10-CM

## 2019-02-18 DIAGNOSIS — R00.2 PALPITATIONS: ICD-10-CM

## 2019-02-18 PROCEDURE — 1036F TOBACCO NON-USER: CPT | Performed by: INTERNAL MEDICINE

## 2019-02-18 PROCEDURE — G8427 DOCREV CUR MEDS BY ELIG CLIN: HCPCS | Performed by: INTERNAL MEDICINE

## 2019-02-18 PROCEDURE — G8484 FLU IMMUNIZE NO ADMIN: HCPCS | Performed by: INTERNAL MEDICINE

## 2019-02-18 PROCEDURE — G8421 BMI NOT CALCULATED: HCPCS | Performed by: INTERNAL MEDICINE

## 2019-02-18 PROCEDURE — 3017F COLORECTAL CA SCREEN DOC REV: CPT | Performed by: INTERNAL MEDICINE

## 2019-02-18 PROCEDURE — 99204 OFFICE O/P NEW MOD 45 MIN: CPT | Performed by: INTERNAL MEDICINE

## 2019-04-11 ENCOUNTER — OFFICE VISIT (OUTPATIENT)
Dept: FAMILY MEDICINE CLINIC | Age: 59
End: 2019-04-11
Payer: COMMERCIAL

## 2019-04-11 VITALS
TEMPERATURE: 98.1 F | OXYGEN SATURATION: 96 % | HEART RATE: 78 BPM | SYSTOLIC BLOOD PRESSURE: 118 MMHG | DIASTOLIC BLOOD PRESSURE: 82 MMHG

## 2019-04-11 DIAGNOSIS — M79.7 FIBROMYALGIA: ICD-10-CM

## 2019-04-11 DIAGNOSIS — I10 ESSENTIAL HYPERTENSION: Primary | ICD-10-CM

## 2019-04-11 PROCEDURE — G8421 BMI NOT CALCULATED: HCPCS | Performed by: NURSE PRACTITIONER

## 2019-04-11 PROCEDURE — 3017F COLORECTAL CA SCREEN DOC REV: CPT | Performed by: NURSE PRACTITIONER

## 2019-04-11 PROCEDURE — 99214 OFFICE O/P EST MOD 30 MIN: CPT | Performed by: NURSE PRACTITIONER

## 2019-04-11 PROCEDURE — 1036F TOBACCO NON-USER: CPT | Performed by: NURSE PRACTITIONER

## 2019-04-11 PROCEDURE — G8427 DOCREV CUR MEDS BY ELIG CLIN: HCPCS | Performed by: NURSE PRACTITIONER

## 2019-04-11 RX ORDER — GABAPENTIN 400 MG/1
400 CAPSULE ORAL 4 TIMES DAILY
Qty: 360 CAPSULE | Refills: 1 | Status: SHIPPED | OUTPATIENT
Start: 2019-04-11 | End: 2019-10-10

## 2019-04-11 ASSESSMENT — ENCOUNTER SYMPTOMS
COUGH: 0
DIARRHEA: 0
VOMITING: 0
ORTHOPNEA: 0
NAUSEA: 0
BLOOD IN STOOL: 0
SHORTNESS OF BREATH: 0
SORE THROAT: 0
CONSTIPATION: 0
ABDOMINAL PAIN: 0
BACK PAIN: 0

## 2019-04-11 ASSESSMENT — PATIENT HEALTH QUESTIONNAIRE - PHQ9
SUM OF ALL RESPONSES TO PHQ QUESTIONS 1-9: 1
SUM OF ALL RESPONSES TO PHQ QUESTIONS 1-9: 1
2. FEELING DOWN, DEPRESSED OR HOPELESS: 0
1. LITTLE INTEREST OR PLEASURE IN DOING THINGS: 1
SUM OF ALL RESPONSES TO PHQ9 QUESTIONS 1 & 2: 1

## 2019-04-11 NOTE — PROGRESS NOTES
HPI Notes    Name: Xenia Mckeon  : 1960         Chief Complaint:     Chief Complaint   Patient presents with    Hypertension     Patient here today for check up    Muscle Pain     Patient here today for check up on fibromyalgia. Needs refill on gabapentin and diclofenac       History of Present Illness:        Hypertension   This is a chronic problem. The current episode started more than 1 year ago. The problem is controlled. Pertinent negatives include no chest pain, headaches, neck pain, orthopnea, palpitations, peripheral edema or shortness of breath. Risk factors for coronary artery disease include obesity and sedentary lifestyle. Past treatments include ACE inhibitors. There are no compliance problems. Muscle Pain   The pain is present in the right arm, left arm, left lower leg and right upper leg. Pertinent negatives include no abdominal pain, chest pain, constipation, diarrhea, dysuria, fever, headaches, nausea, rash, shortness of breath, vomiting or weakness. Treatments tried: neurontin. The treatment provided moderate relief.        Past Medical History:     Past Medical History:   Diagnosis Date    Acid reflux     [address]eding tendency ([facility] [address]     Blood type O+     Bronchitis     Chronic back pain     Depression     Diverticulitis     DVT (deep venous thrombosis) ([facility] [address]     Left leg    Fibromyalgia     Hypertension     Internal hemorrhoid     Left thyroid nodule 2013    Nausea & vomiting       Reviewed all health maintenance requirements and ordered appropriate tests  Health Maintenance Due   Topic Date Due    DTaP/Tdap/Td vaccine (1 - Tdap) 04/15/1979    Shingles Vaccine (1 of 2) 04/15/2010    Cervical cancer screen  09/15/2017       Past Surgical History:     Past Surgical History:   Procedure Laterality Date    CHOLECYSTECTOMY  2013    laparoscopic    COLONOSCOPY      / Dr. Duncan Messina Left Medications:       Prior to Admission medications    Medication Sig Start Date End Date Taking? Authorizing Provider   gabapentin (NEURONTIN) 400 MG capsule Take 1 capsule by mouth 4 times daily for 90 days. 4/11/19 7/10/19 Yes ASHLEY Anguiano CNP   diclofenac (VOLTAREN) 50 MG EC tablet Take 1 tablet by mouth 3 times daily (with meals) for 10 days 4/11/19 4/21/19 Yes ASHLEY Anguiano CNP   Elastic Bandages & Supports ([address] ) [address] 1 each by Does not apply route daily Knee high 20mmHg 1/3/19  Yes ASHLEY Anguiano CNP   lisinopril-hydrochlorothiazide (ZESTORETIC) 20-25 MG per tablet Take 1 tablet by mouth daily 1/3/19  Yes ASHLEY Anguiano CNP   omeprazole (PRILOSEC) 20 MG delayed release capsule Take 1 capsule by mouth daily 1/3/19  Yes ASHLEY Anguiano CNP   amitriptyline (ELAVIL) 100 MG tablet Take 1 tablet by mouth nightly 1/3/19  Yes ASHLEY Anguiano CNP        Allergies:       Ampicillin and Sulfa antibiotics    Social History:     Tobacco:    reports that she has never smoked. She has never used smokeless tobacco.  Alcohol:      reports that she does not drink alcohol. Drug Use:  reports that she does not use drugs. Family History:        Family History   Problem Relation Age of Onset    Stroke Father     Diabetes Sister     Thyroid Disease Brother     Atrial Fibrillation Brother     Atrial Fibrillation Mother        Review of Systems:         Review of Systems   Constitutional: Negative for chills and fever. HENT: Negative for sore throat. Respiratory: Negative for cough and shortness of breath. Cardiovascular: Negative for chest pain, palpitations, orthopnea and leg swelling. Gastrointestinal: Negative for abdominal pain, blood in stool, constipation, diarrhea, nausea and vomiting. Genitourinary: Negative for dysuria, frequency and hematuria. Musculoskeletal: Negative for back pain and neck pain. Skin: Negative for rash. Neurological: Negative for dizziness, tremors, seizures, weakness and headaches. Hematological: Does not bruise/bleed easily. Psychiatric/Behavioral: Negative for suicidal ideas. The patient is not nervous/anxious. Physical Exam:     Vitals:  /82   Pulse 78   Temp 98.1 °F (36.7 °C) (Oral)   SpO2 96%       Physical Exam   Constitutional: She is oriented to person, place, and time. Vital signs are normal. She appears well-developed and well-nourished. HENT:   Head: Normocephalic. Right Ear: Hearing, tympanic membrane and external ear normal.   Left Ear: Hearing, tympanic membrane and external ear normal.   Nose: Nose normal.   Mouth/Throat: Uvula is midline, oropharynx is clear and moist and mucous membranes are normal.   Eyes: Pupils are equal, round, and reactive to light. Conjunctivae and EOM are normal.   Neck: Trachea normal and normal range of motion. Carotid bruit is not present. No thyroid mass present. Cardiovascular: Normal rate, regular rhythm, S1 normal, S2 normal, normal heart sounds and intact distal pulses. Pulses:       Dorsalis pedis pulses are 2+ on the right side, and 2+ on the left side. 2+ BLE edema   Pulmonary/Chest: Effort normal and breath sounds normal.   Abdominal: Soft. Normal appearance. There is no tenderness. Musculoskeletal: Normal range of motion. Neurological: She is alert and oriented to person, place, and time. She has normal strength and normal reflexes. GCS eye subscore is 4. GCS verbal subscore is 5. GCS motor subscore is 6. Skin: Skin is warm, dry and intact. No rash noted. Bruno discoloration of bilat legs   Psychiatric: She has a normal mood and affect. Her speech is normal and behavior is normal. Judgment and thought content normal. Cognition and memory are normal.   Nursing note and vitals reviewed.             Data:     Lab Results   Component Value Date     05/18/2018    K 4.6 05/18/2018     diclofenac (VOLTAREN) 50 MG EC tablet     Sig: Take 1 tablet by mouth 3 times daily (with meals) for 10 days     Dispense:  30 tablet     Refill:  0     No orders of the defined types were placed in this encounter. Patient Instructions     SURVEY:    You may be receiving a survey from [employer] regarding your visit today. Please complete the survey to enable us to provide the highest quality of care to you and your family. If you cannot score us a very good on any question, please call the office to discuss how we could have made your experience a very good one. Thank you. Electronically signed by ASHLEY Sevilla CNP on 4/11/2019 at 2:55 PM           Completed Refills      Requested Prescriptions     Signed Prescriptions Disp Refills    gabapentin (NEURONTIN) 400 MG capsule 360 capsule 1     Sig: Take 1 capsule by mouth 4 times daily for 90 days.  diclofenac (VOLTAREN) 50 MG EC tablet 30 tablet 0     Sig: Take 1 tablet by mouth 3 times daily (with meals) for 10 days         Marifer received counseling on the following healthy behaviors: medication adherence, exercise  Reviewed prior labs and health maintenance. Continue current medications, diet and exercise. Discussed use, benefit, and side effects of prescribed medications. Barriers to medication compliance addressed. Patient given educational materials - see patient instructions. All patient questions answered. Patient voiced understanding.

## 2019-05-23 LAB
BUN BLDV-MCNC: 24 MG/DL
CALCIUM SERPL-MCNC: 8.6 MG/DL
CHLORIDE BLD-SCNC: 104 MMOL/L
CHOLESTEROL, TOTAL: 207 MG/DL
CHOLESTEROL/HDL RATIO: 5.8
CO2: 23.9 MMOL/L
CREAT SERPL-MCNC: 1.3 MG/DL
GFR CALCULATED: 42
GLUCOSE BLD-MCNC: 121 MG/DL
HDLC SERPL-MCNC: 36 MG/DL (ref 35–70)
LDL CHOLESTEROL CALCULATED: 131 MG/DL (ref 0–160)
POTASSIUM SERPL-SCNC: 5.3 MMOL/L
SODIUM BLD-SCNC: 138 MMOL/L
TRIGL SERPL-MCNC: 202 MG/DL
VLDLC SERPL CALC-MCNC: 40 MG/DL

## 2019-06-17 ENCOUNTER — OFFICE VISIT (OUTPATIENT)
Dept: FAMILY MEDICINE CLINIC | Age: 59
End: 2019-06-17
Payer: COMMERCIAL

## 2019-06-17 VITALS
TEMPERATURE: 98 F | SYSTOLIC BLOOD PRESSURE: 124 MMHG | DIASTOLIC BLOOD PRESSURE: 82 MMHG | OXYGEN SATURATION: 96 % | HEART RATE: 82 BPM

## 2019-06-17 DIAGNOSIS — R73.01 IMPAIRED FASTING GLUCOSE: Primary | ICD-10-CM

## 2019-06-17 LAB — HBA1C MFR BLD: 6.1 %

## 2019-06-17 PROCEDURE — 99213 OFFICE O/P EST LOW 20 MIN: CPT | Performed by: NURSE PRACTITIONER

## 2019-06-17 PROCEDURE — 1036F TOBACCO NON-USER: CPT | Performed by: NURSE PRACTITIONER

## 2019-06-17 PROCEDURE — 3017F COLORECTAL CA SCREEN DOC REV: CPT | Performed by: NURSE PRACTITIONER

## 2019-06-17 PROCEDURE — G8421 BMI NOT CALCULATED: HCPCS | Performed by: NURSE PRACTITIONER

## 2019-06-17 PROCEDURE — 83036 HEMOGLOBIN GLYCOSYLATED A1C: CPT | Performed by: NURSE PRACTITIONER

## 2019-06-17 PROCEDURE — G8427 DOCREV CUR MEDS BY ELIG CLIN: HCPCS | Performed by: NURSE PRACTITIONER

## 2019-06-17 NOTE — PATIENT INSTRUCTIONS
SURVEY:    You may be receiving a survey from [employer] regarding your visit today. Please complete the survey to enable us to provide the highest quality of care to you and your family. If you cannot score us a very good on any question, please call the office to discuss how we could have made your experience a very good one. Thank you.

## 2019-06-17 NOTE — PROGRESS NOTES
HPI Notes    Name: Tanisha Grayson  : 1960         Chief Complaint:     Chief Complaint   Patient presents with   [address]     Patient here today to discuss recent labs done at health fair done at [facility]        History of Present Illness:        HPI  Patient is a 40-year-old female who reports today to discuss recent [facility] lab fair lab results. Patient denies any physical symptoms or any complaints at this time. Past Medical History:     Past Medical History:   Diagnosis Date    Acid reflux     Bleeding tendency ([address]     Blood type O+     Bronchitis     Chronic back pain     Depression     Diverticulitis     DVT (deep venous thrombosis) ([address]     Left leg    Fibromyalgia     Hypertension     Internal hemorrhoid     Left thyroid nodule 2013    Nausea & vomiting       Reviewed all health maintenance requirements and ordered appropriate tests  Health Maintenance Due   Topic Date Due    DTaP/Tdap/Td vaccine (1 - Tdap) 04/15/1979    Shingles Vaccine (1 of 2) 04/15/2010    Cervical cancer screen  09/15/2017       Past Surgical History:     Past Surgical History:   Procedure Laterality Date    CHOLECYSTECTOMY  2013    laparoscopic    COLONOSCOPY      / Dr. Ashish Molina Left         Medications:       Prior to Admission medications    Medication Sig Start Date End Date Taking? Authorizing Provider   gabapentin (NEURONTIN) 400 MG capsule Take 1 capsule by mouth 4 times daily for 90 days.  4/11/19 7/10/19 Yes ASHLEY Jimenes CNP   lisinopril-hydrochlorothiazide (ZESTORETIC) 20-25 MG per tablet Take 1 tablet by mouth daily 1/3/19  Yes ASHLEY Jimenes CNP   omeprazole (PRILOSEC) 20 MG delayed release capsule Take 1 capsule by mouth daily 1/3/19  Yes ASHLEY Jimenes CNP   amitriptyline (ELAVIL) 100 MG tablet Take 1 tablet by mouth nightly 1/3/19  Yes ASHLEY Jimenes CNP diclofenac (VOLTAREN) 50 MG EC tablet Take 1 tablet by mouth 3 times daily (with meals) for 10 days 4/11/19 4/21/19  ASHLEY Carbajal CNP   Elastic Bandages & Supports ([facility]) [facility] 1 each by Does not apply route daily Knee high 20mmHg 1/3/19   ASHLEY Carbajal CNP        Allergies:       Ampicillin and Sulfa antibiotics    Social History:     Tobacco:    reports that she has never smoked. She has never used smokeless tobacco.  Alcohol:      reports that she does not drink alcohol. Drug Use:  reports that she does not use drugs. Family History:        Family History   Problem Relation Age of Onset    Stroke Father     Diabetes Sister     Thyroid Disease Brother     Atrial Fibrillation Brother     Atrial Fibrillation Mother        Review of Systems:         Review of Systems   Constitutional: Negative for chills and fever. Respiratory: Negative for cough and shortness of breath. Cardiovascular: Negative for chest pain and palpitations. Gastrointestinal: Negative for diarrhea, nausea and vomiting. Neurological: Negative for dizziness, seizures and headaches. Physical Exam:     Vitals:  /82 (Site: Left Lower Arm, Position: Sitting, Cuff Size: Medium Adult)   Pulse 82   Temp 98 °F (36.7 °C) (Oral)   SpO2 96%       Physical Exam   Constitutional: She is oriented to person, place, and time. She appears well-developed and well-nourished. Cardiovascular: Normal rate, regular rhythm, S1 normal, S2 normal and normal heart sounds. Pulmonary/Chest: Effort normal and breath sounds normal. No respiratory distress. Abdominal: Soft. Normal appearance and bowel sounds are normal. There is no tenderness. Neurological: She is alert and oriented to person, place, and time. Skin: Skin is warm and dry. Nursing note and vitals reviewed.             Data:     Lab Results   Component Value Date     05/23/2019    K 5.3 05/23/2019     05/23/2019    CO2 23.9 05/23/2019    BUN 24 05/23/2019    CREATININE 1.30 05/23/2019    GLUCOSE 121 05/23/2019    PROT 6.8 04/30/2018    LABALBU 3.9 04/30/2018    BILITOT 0.20 04/30/2018    ALKPHOS 113 04/30/2018    AST 19 04/30/2018    ALT 17 04/30/2018     Lab Results   Component Value Date    WBC 7.2 04/30/2018    RBC 4.75 04/30/2018    RBC 4.17 05/15/2012    HGB 12.7 04/30/2018    HCT 38.8 04/30/2018    MCV 81.7 04/30/2018    MCH 26.7 04/30/2018    MCHC 32.7 04/30/2018    RDW 16.5 04/30/2018     04/30/2018     05/15/2012    MPV NOT REPORTED 04/30/2018     Lab Results   Component Value Date    TSH 2.21 04/30/2018     Lab Results   Component Value Date    CHOL 207 05/23/2019    HDL 36 05/23/2019    LABA1C 6.1 06/17/2019          Assessment & Plan        Diagnosis Orders   1. Impaired fasting glucose  POCT glycosylated hemoglobin (Hb A1C)     Labs reviewed with patient at great length. Point-of-care hemoglobin A1c was 6.1. Patient educated about impaired fasting glucose and prediabetes. Patient educated to decrease 10% of her body weight. Patient educated to decrease carbohydrates and simple sugars in diet. Patient verbalizes understanding and agreement with plan. All questions answered. If symptoms do not resolve or worsen, return to office. Completed Refills   Requested Prescriptions      No prescriptions requested or ordered in this encounter     No follow-ups on file. No orders of the defined types were placed in this encounter. Orders Placed This Encounter   Procedures    POCT glycosylated hemoglobin (Hb A1C)         Patient Instructions     SURVEY:    You may be receiving a survey from [employer] regarding your visit today. Please complete the survey to enable us to provide the highest quality of care to you and your family.     If you cannot score us a very good on any question, please call the office to discuss how we could have made your experience a very good one.    Thank you. Electronically signed by ASHLEY Abdalla CNP on 6/19/2019 at 8:26 AM           Completed Refills      Requested Prescriptions      No prescriptions requested or ordered in this encounter         Marifer received counseling on the following healthy behaviors: nutrition, exercise and medication adherence  Reviewed prior labs and health maintenance. Continue current medications, diet and exercise. Discussed use, benefit, and side effects of prescribed medications. Barriers to medication compliance addressed. Patient given educational materials - see patient instructions. All patient questions answered. Patient voiced understanding.

## 2019-06-19 ASSESSMENT — ENCOUNTER SYMPTOMS
DIARRHEA: 0
SHORTNESS OF BREATH: 0
VOMITING: 0
NAUSEA: 0
COUGH: 0

## 2019-08-12 RX ORDER — LISINOPRIL AND HYDROCHLOROTHIAZIDE 25; 20 MG/1; MG/1
1 TABLET ORAL DAILY
Qty: 90 TABLET | Refills: 1 | Status: SHIPPED | OUTPATIENT
Start: 2019-08-12 | End: 2019-12-03

## 2019-08-12 RX ORDER — OMEPRAZOLE 20 MG/1
20 CAPSULE, DELAYED RELEASE ORAL DAILY
Qty: 90 CAPSULE | Refills: 1 | Status: SHIPPED | OUTPATIENT
Start: 2019-08-12 | End: 2020-02-10 | Stop reason: SDUPTHER

## 2019-08-12 NOTE — TELEPHONE ENCOUNTER
([facility]     MAYURI (obstructive sleep apnea)     Essential hypertension     Coxitis     History of total hip replacement     Gastroesophageal reflux disease without esophagitis     Polyarthritis

## 2019-08-27 RX ORDER — AMITRIPTYLINE HYDROCHLORIDE 100 MG/1
100 TABLET, FILM COATED ORAL NIGHTLY
Qty: 30 TABLET | Refills: 0 | Status: SHIPPED | OUTPATIENT
Start: 2019-08-27 | End: 2019-09-05 | Stop reason: SDUPTHER

## 2019-08-30 ENCOUNTER — TELEPHONE (OUTPATIENT)
Dept: FAMILY MEDICINE CLINIC | Age: 59
End: 2019-08-30

## 2019-08-30 NOTE — TELEPHONE ENCOUNTER
[facility] mammogram    Patient refuses mammogram.   Has appt 10/10/19 and will discuss mammogram at this appt.

## 2019-09-03 ENCOUNTER — TELEPHONE (OUTPATIENT)
Dept: FAMILY MEDICINE CLINIC | Age: 59
End: 2019-09-03

## 2019-09-05 RX ORDER — AMITRIPTYLINE HYDROCHLORIDE 100 MG/1
100 TABLET, FILM COATED ORAL NIGHTLY
Qty: 30 TABLET | Refills: 0 | Status: SHIPPED | OUTPATIENT
Start: 2019-09-05 | End: 2019-09-10 | Stop reason: SDUPTHER

## 2019-09-10 ENCOUNTER — TELEPHONE (OUTPATIENT)
Dept: FAMILY MEDICINE CLINIC | Age: 59
End: 2019-09-10

## 2019-09-10 RX ORDER — AMITRIPTYLINE HYDROCHLORIDE 100 MG/1
100 TABLET, FILM COATED ORAL NIGHTLY
Qty: 90 TABLET | Refills: 1 | Status: SHIPPED | OUTPATIENT
Start: 2019-09-10 | End: 2020-03-18 | Stop reason: SDUPTHER

## 2019-09-23 ENCOUNTER — TELEPHONE (OUTPATIENT)
Dept: FAMILY MEDICINE CLINIC | Age: 59
End: 2019-09-23

## 2019-09-23 DIAGNOSIS — M19.011 PRIMARY OSTEOARTHRITIS OF BOTH SHOULDERS: Primary | ICD-10-CM

## 2019-09-23 DIAGNOSIS — M19.012 PRIMARY OSTEOARTHRITIS OF BOTH SHOULDERS: Primary | ICD-10-CM

## 2019-09-23 NOTE — TELEPHONE ENCOUNTER
Please call pt and try to get a little more information. Find out where the arthritis is. Has she carlos to ortho or rheum? ? Is this fibromyalgia pain?

## 2019-10-10 ENCOUNTER — OFFICE VISIT (OUTPATIENT)
Dept: FAMILY MEDICINE CLINIC | Age: 59
End: 2019-10-10
Payer: COMMERCIAL

## 2019-10-10 VITALS
SYSTOLIC BLOOD PRESSURE: 114 MMHG | OXYGEN SATURATION: 97 % | DIASTOLIC BLOOD PRESSURE: 80 MMHG | HEART RATE: 86 BPM | TEMPERATURE: 98 F

## 2019-10-10 DIAGNOSIS — M19.012 PRIMARY OSTEOARTHRITIS OF BOTH SHOULDERS: ICD-10-CM

## 2019-10-10 DIAGNOSIS — M19.011 PRIMARY OSTEOARTHRITIS OF BOTH SHOULDERS: ICD-10-CM

## 2019-10-10 DIAGNOSIS — M79.7 FIBROMYALGIA: ICD-10-CM

## 2019-10-10 DIAGNOSIS — M13.0 POLYARTHRITIS: ICD-10-CM

## 2019-10-10 DIAGNOSIS — I10 ESSENTIAL HYPERTENSION: Primary | ICD-10-CM

## 2019-10-10 PROCEDURE — 1036F TOBACCO NON-USER: CPT | Performed by: NURSE PRACTITIONER

## 2019-10-10 PROCEDURE — 99214 OFFICE O/P EST MOD 30 MIN: CPT | Performed by: NURSE PRACTITIONER

## 2019-10-10 PROCEDURE — G8427 DOCREV CUR MEDS BY ELIG CLIN: HCPCS | Performed by: NURSE PRACTITIONER

## 2019-10-10 PROCEDURE — 3017F COLORECTAL CA SCREEN DOC REV: CPT | Performed by: NURSE PRACTITIONER

## 2019-10-10 PROCEDURE — G8421 BMI NOT CALCULATED: HCPCS | Performed by: NURSE PRACTITIONER

## 2019-10-10 PROCEDURE — G8484 FLU IMMUNIZE NO ADMIN: HCPCS | Performed by: NURSE PRACTITIONER

## 2019-10-10 RX ORDER — CELECOXIB 100 MG/1
100 CAPSULE ORAL 2 TIMES DAILY
Qty: 60 CAPSULE | Refills: 2 | Status: SHIPPED | OUTPATIENT
Start: 2019-10-10 | End: 2019-12-09 | Stop reason: SDUPTHER

## 2019-10-10 RX ORDER — GABAPENTIN 400 MG/1
1200 CAPSULE ORAL 2 TIMES DAILY
Qty: 540 CAPSULE | Refills: 1 | Status: SHIPPED | OUTPATIENT
Start: 2019-10-10 | End: 2019-10-17 | Stop reason: SDUPTHER

## 2019-10-10 RX ORDER — GABAPENTIN 400 MG/1
400 CAPSULE ORAL
COMMUNITY
Start: 2016-08-22 | End: 2019-10-10 | Stop reason: SDUPTHER

## 2019-10-10 ASSESSMENT — ENCOUNTER SYMPTOMS
VOMITING: 0
COUGH: 0
DIARRHEA: 0
NAUSEA: 0
ORTHOPNEA: 0
SHORTNESS OF BREATH: 0

## 2019-10-17 ENCOUNTER — TELEPHONE (OUTPATIENT)
Dept: FAMILY MEDICINE CLINIC | Age: 59
End: 2019-10-17

## 2019-10-17 RX ORDER — GABAPENTIN 400 MG/1
1200 CAPSULE ORAL 2 TIMES DAILY
Qty: 540 CAPSULE | Refills: 1 | Status: SHIPPED | OUTPATIENT
Start: 2019-10-17 | End: 2020-04-02 | Stop reason: SDUPTHER

## 2019-10-24 ENCOUNTER — TELEPHONE (OUTPATIENT)
Dept: CARDIOLOGY CLINIC | Age: 59
End: 2019-10-24

## 2019-12-03 ENCOUNTER — HOSPITAL ENCOUNTER (EMERGENCY)
Age: 59
Discharge: HOME | End: 2019-12-03
Attending: FAMILY MEDICINE
Payer: COMMERCIAL

## 2019-12-03 ENCOUNTER — HOSPITAL ENCOUNTER (EMERGENCY)
Dept: NON INVASIVE DIAGNOSTICS | Age: 59
Discharge: HOME | End: 2019-12-03
Payer: COMMERCIAL

## 2019-12-03 ENCOUNTER — TELEPHONE (OUTPATIENT)
Dept: FAMILY MEDICINE CLINIC | Age: 59
End: 2019-12-03

## 2019-12-03 VITALS
WEIGHT: 293 LBS | RESPIRATION RATE: 16 BRPM | SYSTOLIC BLOOD PRESSURE: 124 MMHG | DIASTOLIC BLOOD PRESSURE: 77 MMHG | BODY MASS INDEX: 61.52 KG/M2 | HEART RATE: 81 BPM | OXYGEN SATURATION: 93 % | TEMPERATURE: 98.4 F

## 2019-12-03 DIAGNOSIS — I47.1 SVT (SUPRAVENTRICULAR TACHYCARDIA) (HCC): Primary | ICD-10-CM

## 2019-12-03 LAB
ABSOLUTE EOS #: 0 K/UL (ref 0–0.4)
ABSOLUTE IMMATURE GRANULOCYTE: ABNORMAL K/UL (ref 0–0.3)
ABSOLUTE LYMPH #: 1.3 K/UL (ref 1–4.8)
ABSOLUTE MONO #: 0.5 K/UL (ref 0–1)
ANION GAP SERPL CALCULATED.3IONS-SCNC: 14 MMOL/L (ref 9–17)
BASOPHILS # BLD: 0 % (ref 0–2)
BASOPHILS ABSOLUTE: 0 K/UL (ref 0–0.2)
BNP INTERPRETATION: NORMAL
BUN BLDV-MCNC: 16 MG/DL (ref 6–20)
BUN/CREAT BLD: 16 (ref 9–20)
CALCIUM SERPL-MCNC: 9.9 MG/DL (ref 8.6–10.4)
CHLORIDE BLD-SCNC: 100 MMOL/L (ref 98–107)
CO2: 23 MMOL/L (ref 20–31)
CREAT SERPL-MCNC: 1.02 MG/DL (ref 0.5–0.9)
DIFFERENTIAL TYPE: YES
EOSINOPHILS RELATIVE PERCENT: 0 % (ref 0–5)
GFR AFRICAN AMERICAN: >60 ML/MIN
GFR NON-AFRICAN AMERICAN: 55 ML/MIN
GFR SERPL CREATININE-BSD FRML MDRD: ABNORMAL ML/MIN/{1.73_M2}
GFR SERPL CREATININE-BSD FRML MDRD: ABNORMAL ML/MIN/{1.73_M2}
GLUCOSE BLD-MCNC: 125 MG/DL (ref 70–99)
HCT VFR BLD CALC: 36 % (ref 36–46)
HEMOGLOBIN: 12 G/DL (ref 12–16)
IMMATURE GRANULOCYTES: ABNORMAL %
LYMPHOCYTES # BLD: 20 % (ref 15–40)
MCH RBC QN AUTO: 29 PG (ref 26–34)
MCHC RBC AUTO-ENTMCNC: 33.3 G/DL (ref 31–37)
MCV RBC AUTO: 87.3 FL (ref 80–100)
MONOCYTES # BLD: 8 % (ref 4–8)
NRBC AUTOMATED: ABNORMAL PER 100 WBC
PDW BLD-RTO: 16.3 % (ref 12.1–15.2)
PLATELET # BLD: 268 K/UL (ref 140–450)
PLATELET ESTIMATE: ABNORMAL
PMV BLD AUTO: ABNORMAL FL (ref 6–12)
POTASSIUM SERPL-SCNC: 4.1 MMOL/L (ref 3.7–5.3)
PRO-BNP: 137 PG/ML
RBC # BLD: 4.13 M/UL (ref 4–5.2)
RBC # BLD: ABNORMAL 10*6/UL
SEG NEUTROPHILS: 72 % (ref 47–75)
SEGMENTED NEUTROPHILS ABSOLUTE COUNT: 4.6 K/UL (ref 2.5–7)
SODIUM BLD-SCNC: 137 MMOL/L (ref 135–144)
TROPONIN INTERP: NORMAL
TROPONIN T: <0.03 NG/ML
TROPONIN, HIGH SENSITIVITY: NORMAL NG/L (ref 0–14)
TSH SERPL DL<=0.05 MIU/L-ACNC: 3.14 MIU/L (ref 0.3–5)
WBC # BLD: 6.4 K/UL (ref 3.5–11)
WBC # BLD: ABNORMAL 10*3/UL

## 2019-12-03 PROCEDURE — 96374 THER/PROPH/DIAG INJ IV PUSH: CPT

## 2019-12-03 PROCEDURE — 84443 ASSAY THYROID STIM HORMONE: CPT

## 2019-12-03 PROCEDURE — 6360000002 HC RX W HCPCS: Performed by: FAMILY MEDICINE

## 2019-12-03 PROCEDURE — 80048 BASIC METABOLIC PNL TOTAL CA: CPT

## 2019-12-03 PROCEDURE — 83880 ASSAY OF NATRIURETIC PEPTIDE: CPT

## 2019-12-03 PROCEDURE — 85025 COMPLETE CBC W/AUTO DIFF WBC: CPT

## 2019-12-03 PROCEDURE — 93005 ELECTROCARDIOGRAM TRACING: CPT | Performed by: FAMILY MEDICINE

## 2019-12-03 PROCEDURE — 99285 EMERGENCY DEPT VISIT HI MDM: CPT

## 2019-12-03 PROCEDURE — 84484 ASSAY OF TROPONIN QUANT: CPT

## 2019-12-03 PROCEDURE — 93270 REMOTE 30 DAY ECG REV/REPORT: CPT

## 2019-12-03 RX ORDER — ADENOSINE 3 MG/ML
6 INJECTION, SOLUTION INTRAVENOUS ONCE
Status: COMPLETED | OUTPATIENT
Start: 2019-12-03 | End: 2019-12-03

## 2019-12-03 RX ORDER — DILTIAZEM HYDROCHLORIDE 120 MG/1
120 CAPSULE, COATED, EXTENDED RELEASE ORAL DAILY
COMMUNITY
Start: 2019-12-02 | End: 2019-12-09 | Stop reason: SDUPTHER

## 2019-12-03 RX ORDER — METOPROLOL SUCCINATE 25 MG/1
25 TABLET, EXTENDED RELEASE ORAL DAILY
Qty: 30 TABLET | Refills: 1 | Status: SHIPPED | OUTPATIENT
Start: 2019-12-03 | End: 2019-12-26 | Stop reason: SDUPTHER

## 2019-12-03 RX ADMIN — ADENOSINE 6 MG: 3 INJECTION INTRAVENOUS at 14:48

## 2019-12-03 ASSESSMENT — ENCOUNTER SYMPTOMS: CHEST TIGHTNESS: 1

## 2019-12-04 LAB
EKG ATRIAL RATE: 150 BPM
EKG ATRIAL RATE: 93 BPM
EKG P AXIS: 59 DEGREES
EKG P-R INTERVAL: 162 MS
EKG Q-T INTERVAL: 284 MS
EKG Q-T INTERVAL: 308 MS
EKG QRS DURATION: 74 MS
EKG QRS DURATION: 82 MS
EKG QTC CALCULATION (BAZETT): 382 MS
EKG QTC CALCULATION (BAZETT): 450 MS
EKG R AXIS: 52 DEGREES
EKG R AXIS: 55 DEGREES
EKG T AXIS: 48 DEGREES
EKG T AXIS: 50 DEGREES
EKG VENTRICULAR RATE: 151 BPM
EKG VENTRICULAR RATE: 93 BPM

## 2019-12-04 PROCEDURE — 93010 ELECTROCARDIOGRAM REPORT: CPT | Performed by: INTERNAL MEDICINE

## 2019-12-09 ENCOUNTER — OFFICE VISIT (OUTPATIENT)
Dept: FAMILY MEDICINE CLINIC | Age: 59
End: 2019-12-09
Payer: COMMERCIAL

## 2019-12-09 VITALS
HEART RATE: 68 BPM | SYSTOLIC BLOOD PRESSURE: 122 MMHG | TEMPERATURE: 97.8 F | DIASTOLIC BLOOD PRESSURE: 80 MMHG | OXYGEN SATURATION: 97 %

## 2019-12-09 DIAGNOSIS — I26.99 PE (PULMONARY THROMBOEMBOLISM) (HCC): Primary | ICD-10-CM

## 2019-12-09 DIAGNOSIS — I47.1 SVT (SUPRAVENTRICULAR TACHYCARDIA) (HCC): ICD-10-CM

## 2019-12-09 PROCEDURE — G8427 DOCREV CUR MEDS BY ELIG CLIN: HCPCS | Performed by: NURSE PRACTITIONER

## 2019-12-09 PROCEDURE — G8484 FLU IMMUNIZE NO ADMIN: HCPCS | Performed by: NURSE PRACTITIONER

## 2019-12-09 PROCEDURE — 99213 OFFICE O/P EST LOW 20 MIN: CPT | Performed by: NURSE PRACTITIONER

## 2019-12-09 PROCEDURE — 1036F TOBACCO NON-USER: CPT | Performed by: NURSE PRACTITIONER

## 2019-12-09 PROCEDURE — G8417 CALC BMI ABV UP PARAM F/U: HCPCS | Performed by: NURSE PRACTITIONER

## 2019-12-09 PROCEDURE — 3017F COLORECTAL CA SCREEN DOC REV: CPT | Performed by: NURSE PRACTITIONER

## 2019-12-09 RX ORDER — CELECOXIB 100 MG/1
100 CAPSULE ORAL 2 TIMES DAILY
Qty: 180 CAPSULE | Refills: 1 | Status: SHIPPED | OUTPATIENT
Start: 2019-12-09 | End: 2020-04-02 | Stop reason: SDUPTHER

## 2019-12-09 RX ORDER — DILTIAZEM HYDROCHLORIDE 120 MG/1
120 CAPSULE, COATED, EXTENDED RELEASE ORAL DAILY
Qty: 90 CAPSULE | Refills: 1 | Status: SHIPPED | OUTPATIENT
Start: 2019-12-09 | End: 2020-02-20 | Stop reason: SDUPTHER

## 2019-12-09 ASSESSMENT — ENCOUNTER SYMPTOMS
VOMITING: 0
SHORTNESS OF BREATH: 0
DIARRHEA: 0
COUGH: 0
NAUSEA: 0

## 2019-12-17 ENCOUNTER — TELEPHONE (OUTPATIENT)
Dept: CARDIOLOGY CLINIC | Age: 59
End: 2019-12-17

## 2019-12-26 RX ORDER — METOPROLOL SUCCINATE 25 MG/1
25 TABLET, EXTENDED RELEASE ORAL 2 TIMES DAILY
Qty: 60 TABLET | Refills: 11 | Status: SHIPPED | OUTPATIENT
Start: 2019-12-26 | End: 2020-01-13

## 2020-01-13 ENCOUNTER — OFFICE VISIT (OUTPATIENT)
Dept: CARDIOLOGY CLINIC | Age: 60
End: 2020-01-13
Payer: COMMERCIAL

## 2020-01-13 VITALS
SYSTOLIC BLOOD PRESSURE: 140 MMHG | HEART RATE: 76 BPM | BODY MASS INDEX: 61.4 KG/M2 | OXYGEN SATURATION: 90 % | DIASTOLIC BLOOD PRESSURE: 80 MMHG | WEIGHT: 293 LBS

## 2020-01-13 PROCEDURE — G8484 FLU IMMUNIZE NO ADMIN: HCPCS | Performed by: INTERNAL MEDICINE

## 2020-01-13 PROCEDURE — 99214 OFFICE O/P EST MOD 30 MIN: CPT | Performed by: INTERNAL MEDICINE

## 2020-01-13 PROCEDURE — G8427 DOCREV CUR MEDS BY ELIG CLIN: HCPCS | Performed by: INTERNAL MEDICINE

## 2020-01-13 PROCEDURE — G8417 CALC BMI ABV UP PARAM F/U: HCPCS | Performed by: INTERNAL MEDICINE

## 2020-01-13 PROCEDURE — 1036F TOBACCO NON-USER: CPT | Performed by: INTERNAL MEDICINE

## 2020-01-13 PROCEDURE — 3017F COLORECTAL CA SCREEN DOC REV: CPT | Performed by: INTERNAL MEDICINE

## 2020-01-13 RX ORDER — TRIAMTERENE AND HYDROCHLOROTHIAZIDE 37.5; 25 MG/1; MG/1
1 TABLET ORAL DAILY
Qty: 30 TABLET | Refills: 11 | Status: SHIPPED | OUTPATIENT
Start: 2020-01-13 | End: 2020-02-11 | Stop reason: ALTCHOICE

## 2020-01-13 RX ORDER — METOPROLOL SUCCINATE 50 MG/1
50 TABLET, EXTENDED RELEASE ORAL DAILY
Qty: 30 TABLET | Refills: 11 | Status: SHIPPED | OUTPATIENT
Start: 2020-01-13 | End: 2020-02-11 | Stop reason: SDUPTHER

## 2020-01-13 NOTE — LETTER
Tomeka Spear M.D. [address], [facility]  [phone]      2020      Amber Reevesveland, [facility], [facility]      RE:   Britt Mccloud  :  1960      Dear [employer]:    CHIEF COMPLAINT:  1. SVT. 2.  Status post PE on 2019, with hospitalization at [facility], for which she was placed on Eliquis. HISTORY OF PRESENT ILLNESS:  I had the pleasure of seeing Mrs. Gema Muro in our office on 2020. She is a pleasant 59-year-old female that I had seen on 2019. She has a long history of hypertension, severe fibromyalgia, and arthritis. She, at that time, had intermittent episodes of palpitations where she felt a skipped heartbeat and at other times she would feel her heart \"racing,\" although she described it as an accentuated heart rate of 120 beats per minute. She felt somewhat short of breath and no syncope or near syncope, lightheadedness or dizziness. The last time I saw her was  2019; at that time, she had not had any palpitations. You had done an event recorder on 2018, that was unremarkable. I have not seen her since 2019. On , she had been feeling a \"rapid heartbeat\" with palpitations for 2 days. She felt tired and short of breath. She had no calf pain or leg pain. She came to the emergency room. She was in a sinus rhythm at that time. Her D-dimer was markedly elevated and she had a CT scan that showed distal main pulmonary artery and she was admitted to [facility] in [place]. An echocardiogram was done on , that showed normal LV function, EF of 60% to 65%. She had a dilated left atrium and a dilated left ventricle and right atrium. No significant valvular abnormality. Duplex ultrasound, I do not have the results.     She had an EKG on , that showed supraventricular tachycardia at 173 beats per minute. She was discharged on 12/02. While she was hospitalized, she had an episode of SVT and was placed on Cardizem drip and converted to sinus rhythm. She was discharged on Eliquis 5 mg b.i.d., Cardizem  mg daily, Norco p.r.n., Elavil 100 mg nightly. She came back to the emergency room on 12/03, in [facility]. An EKG at that time showed an SVT at 151 beats per minute. She converted to sinus rhythm after she was given adenosine. We added metoprolol 25 mg daily and ordered a 30-day event recorder and I am seeing her today in followup. Rajan Beckham has had still some episodes of palpitation but no sustained episodes. They generally last for 10 to 15 minutes. She has had no syncope or near syncope. She has had some chest pain but is somewhat atypical.  She has had no PND or orthopnea. However, she cannot sleep in bed and sleeps in the recliner. Both legs have been edematous since her discharge. She is fairly inactive and is not on an exercise program.  She has cut back on her coffee and her sweets. It was recommended that she have a sleep test and she has refused historically to do a sleep study. She does have daytime somnolence and falls asleep, if she sits down, very quickly. She does not sleep well at night and wakes up very frequently, although she is not sure why. She does have a history of severe snoring. She has never had a myocardial infarction. She has never had a catheterization. Again, she continues to have intermittent palpitations. She did have an event recorder and on 12/27, had an episode of SVT at 190 beats per minute. She does have a history of hypertension, which is fairly labile. CARDIAC RISK FACTORS:  Hypertension:  Positive. Other Family Members:  Negative. Peripheral Vascular Disease:  Negative. Smoking:  Negative. Diabetes:  Negative. Hyperlipidemia:  Mildly positive. MEDICATIONS AT THIS TIME:  She is on Elavil 100 mg daily, Eliquis 5 mg b.i.d., Celebrex 100 mg b.i.d., Cardizem  mg daily, Neurontin 400 mg 3 tablet b.i.d., metoprolol 25 mg daily, Prilosec 20 mg daily. PAST MEDICAL HISTORY:  1. She had a left thyroid nodule on 07/25/2013. 2.  Hypertension. 3.  Fibromyalgia. 4.  DVT of the left leg in 2012. 5.  History of depression. 6.  Left and right total knee replacement. 7.  Cholecystectomy on 08/22/2013.  8.  Status post right hip replacement in 2016.  9.  History of SVT documented both on her hospitalization on 11/29, and on event recorder. FAMILY HISTORY:  Mother had atrial fibrillation. Father had a CVA. Brother had thyroid disease and atrial fibrillation. SOCIAL HISTORY:  She is 61years old. She is . No children. Does not smoke or drink alcohol. Does not work outside the house. She was here with her sister, Eleno Delarosa. Brandon Shah daughter happens to work in the sleep lab in [place]. REVIEW OF SYSTEMS:  Cardiac as above. Other systems reviewed including constitutional, eyes, ears, nose and throat, cardiovascular, respiratory, GI, , musculoskeletal, integumentary, neurologic, psychiatric, endocrine, hematologic and allergic/immunologic are negative except for what is described above. She does have marked fatigue. She has had more swelling of both lower extremities since her pulmonary embolism. She has had no syncope or near syncope. Does feel her heart start to palpate but it usually stops since she has been on her medications, although on 12/27, she had an episode of SVT at 180 to 200 beats per minute. Does have a large snoring history, sleeps on a recliner. PHYSICAL EXAMINATION:   VITAL SIGNS:  Her blood pressure was elevated at 140/80 with a heart rate of 76 and regular. Respiratory rate 18. O2 saturation was 98%. Weight 392 pounds. GENERAL:  She is a pleasant 51-year-old female. Denied pain.   She was

## 2020-01-14 NOTE — PROGRESS NOTES
Chandrika Palacios M.D. [address], [place] [address]  [phone]      2020      Charletta Rinne A. Picher, [facility], [facility]      RE:   Lane Trujillo  :  1960      Dear Francy Tyler:    CHIEF COMPLAINT:  1. SVT. 2.  Status post PE on 2019, with hospitalization at [facility], for which she was placed on Eliquis. HISTORY OF PRESENT ILLNESS:  I had the pleasure of seeing Mrs. Ladarius Márquez in our office on 2020. She is a pleasant 51-year-old female that I had seen on 2019. She has a long history of hypertension, severe fibromyalgia, and arthritis. She, at that time, had intermittent episodes of palpitations where she felt a skipped heartbeat and at other times she would feel her heart \"racing,\" although she described it as an accentuated heart rate of 120 beats per minute. She felt somewhat short of breath and no syncope or near syncope, lightheadedness or dizziness. The last time I saw her was  2019; at that time, she had not had any palpitations. You had done an event recorder on 2018, that was unremarkable. I have not seen her since 2019. On , she had been feeling a \"rapid heartbeat\" with palpitations for 2 days. She felt tired and short of breath. She had no calf pain or leg pain. She came to the emergency room. She was in a sinus rhythm at that time. Her D-dimer was markedly elevated and she had a CT scan that showed distal main pulmonary artery and she was admitted to [facility] in [place]. An echocardiogram was done on , that showed normal LV function, EF of 60% to 65%. She had a dilated left atrium and a dilated left ventricle and right atrium. No significant valvular abnormality. Duplex ultrasound, I do not have the results. She had an EKG on , that showed supraventricular tachycardia at 173 beats per minute.   She was discharged on 12/02. While she was hospitalized, she had an episode of SVT and was placed on Cardizem drip and converted to sinus rhythm. She was discharged on Eliquis 5 mg b.i.d., Cardizem  mg daily, Norco p.r.n., Elavil 100 mg nightly. She came back to the emergency room on 12/03, in [facility]. An EKG at that time showed an SVT at 151 beats per minute. She converted to sinus rhythm after she was given adenosine. We added metoprolol 25 mg daily and ordered a 30-day event recorder and I am seeing her today in followup. Jenifer Pinto has had still some episodes of palpitation but no sustained episodes. They generally last for 10 to 15 minutes. She has had no syncope or near syncope. She has had some chest pain but is somewhat atypical.  She has had no PND or orthopnea. However, she cannot sleep in bed and sleeps in the recliner. Both legs have been edematous since her discharge. She is fairly inactive and is not on an exercise program.  She has cut back on her coffee and her sweets. It was recommended that she have a sleep test and she has refused historically to do a sleep study. She does have daytime somnolence and falls asleep, if she sits down, very quickly. She does not sleep well at night and wakes up very frequently, although she is not sure why. She does have a history of severe snoring. She has never had a myocardial infarction. She has never had a catheterization. Again, she continues to have intermittent palpitations. She did have an event recorder and on 12/27, had an episode of SVT at 190 beats per minute. She does have a history of hypertension, which is fairly labile. CARDIAC RISK FACTORS:  Hypertension:  Positive. Other Family Members:  Negative. Peripheral Vascular Disease:  Negative. Smoking:  Negative. Diabetes:  Negative. Hyperlipidemia:  Mildly positive.     MEDICATIONS AT THIS TIME:  She is on Elavil 100 mg daily, Eliquis 5 mg b.i.d., Celebrex 100 mg b.i.d., are equally round and intact. Mucous membranes were dry. NECK:  No JVD. Good carotid pulses. No carotid bruits. No lymphadenopathy or thyromegaly. CARDIOVASCULAR EXAM:  S1 and S2 were normal.  No S3 or S4. Soft systolic blowing type murmur. No diastolic murmur. PMI was normal.  No lift, thrust, or pericardial friction rub. LUNGS:  Quite clear to auscultation and percussion. ABDOMEN:  Soft and nontender. Good bowel sounds. Could not feel liver, spleen or aorta. EXTREMITIES:  She had 3 to 4+ edema in both lower extremities. NEUROLOGIC EXAM:  Unremarkable. PSYCHIATRIC EXAM:  Unremarkable. LABORATORY DATA:  From 12/03, sodium was 137, potassium 4.1, BUN 16, creatinine 1.02. Her TSH was 3.14. White count 6.4, hemoglobin 12.0 with a platelet count of 484,[phone]. Event recorder showed SVT at 180 to 200 beats per minute on 12/27. Echocardiogram at [facility] showed dilated right-sided chambers with normal ejection fraction, no significant valvular disease although it was a difficult echo with poor visualization. IMPRESSION:  1. Recurrent SVT documented during her hospitalization on 11/29, when she was hospitalized for pulmonary embolism, with recurrent SVT while she was on Cardizem and Toprol. 2.  PE on 11/29, which was unprovoked. 3.  Hypertension, elevated today. 4.  Probable sleep apnea, with a large snoring history and right-sided chambers historically, which she has refused sleep study previously. PLAN:  1. Increase Cardizem to 120 mg b.i.d.  2.  Increase Toprol to 50 mg daily. 3.  We will add Maxzide 37.5/25 daily. 4.  Highly recommend sleep study. 5.  We will see in 3 weeks with a BMP and reevaluation of her SVT as well as her hypertension and edema. DISCUSSION:  Mrs. Cal Cradoza is fairly complex. She had an unprovoked DVT on 11/29/2019. She will be, therefore, on long-term Eliquis. This was the first that we had captured an arrhythmia such as SVT.   Previously, her event

## 2020-01-17 ENCOUNTER — OFFICE VISIT (OUTPATIENT)
Dept: PRIMARY CARE CLINIC | Age: 60
End: 2020-01-17
Payer: COMMERCIAL

## 2020-01-17 VITALS
BODY MASS INDEX: 45.99 KG/M2 | SYSTOLIC BLOOD PRESSURE: 141 MMHG | OXYGEN SATURATION: 98 % | HEIGHT: 67 IN | HEART RATE: 89 BPM | WEIGHT: 293 LBS | DIASTOLIC BLOOD PRESSURE: 89 MMHG | TEMPERATURE: 98.6 F

## 2020-01-17 PROCEDURE — 1036F TOBACCO NON-USER: CPT | Performed by: NURSE PRACTITIONER

## 2020-01-17 PROCEDURE — G8427 DOCREV CUR MEDS BY ELIG CLIN: HCPCS | Performed by: NURSE PRACTITIONER

## 2020-01-17 PROCEDURE — G8484 FLU IMMUNIZE NO ADMIN: HCPCS | Performed by: NURSE PRACTITIONER

## 2020-01-17 PROCEDURE — 3017F COLORECTAL CA SCREEN DOC REV: CPT | Performed by: NURSE PRACTITIONER

## 2020-01-17 PROCEDURE — G8417 CALC BMI ABV UP PARAM F/U: HCPCS | Performed by: NURSE PRACTITIONER

## 2020-01-17 PROCEDURE — 99213 OFFICE O/P EST LOW 20 MIN: CPT | Performed by: NURSE PRACTITIONER

## 2020-01-17 RX ORDER — VALACYCLOVIR HYDROCHLORIDE 1 G/1
1000 TABLET, FILM COATED ORAL 3 TIMES DAILY
Qty: 21 TABLET | Refills: 0 | Status: SHIPPED | OUTPATIENT
Start: 2020-01-17 | End: 2020-01-24

## 2020-01-17 ASSESSMENT — ENCOUNTER SYMPTOMS
RESPIRATORY NEGATIVE: 1
GASTROINTESTINAL NEGATIVE: 1
ALLERGIC/IMMUNOLOGIC NEGATIVE: 1
EYES NEGATIVE: 1

## 2020-01-17 NOTE — PROGRESS NOTES
[address] [facility] WALK-IN CARE  [facility] [address]  Dept: [phone]  Dept Fax: [phone]    Frank Lim is a 61 y.o. female who presents to the [facility] in Care today for her medical conditions/complaints as noted below. Frank Lim is c/o of Rash (Patient presents today with a painful rash that is in the \"folds of her waist\" and is starting to move down near butt. Patient states it appear about 1 week ago and it hurts to move. ) and Fatigue (Patient c/o feeling more fatigue than usual. )      HPI:    Frank Lim is a 61 y.o. female who presents with  Rash   This is a new problem. Episode onset: over a week ago. The problem is unchanged. Location: Abdominal folds on right side. The rash is characterized by pain, burning and redness (no drainage ). She was exposed to nothing. Associated symptoms include fatigue. Past treatments include nothing. Fatigue   Associated symptoms include fatigue and a rash.          Past Medical History:   Diagnosis Date    Acid reflux     Bleeding tendency ([facility])     Blood type O+     Bronchitis     Chronic back pain     Depression     Diverticulitis     DVT (deep venous thrombosis) ([facility]) 2012    Left leg    Fibromyalgia     Hypertension     Internal hemorrhoid     Left thyroid nodule 7/25/2013    Nausea & vomiting         Current Outpatient Medications   Medication Sig Dispense Refill    valACYclovir (VALTREX) 1 g tablet Take 1 tablet by mouth 3 times daily for 7 days 21 tablet 0    metoprolol succinate (TOPROL XL) 50 MG extended release tablet Take 1 tablet by mouth daily 30 tablet 11    triamterene-hydrochlorothiazide (MAXZIDE-25) 37.5-25 MG per tablet Take 1 tablet by mouth daily 30 tablet 11    celecoxib (CELEBREX) 100 MG capsule Take 1 capsule by mouth 2 times daily 180 capsule 1    diltiazem (CARDIZEM CD) 120 MG extended release capsule Take 1 capsule by mouth daily Take two tabs for first 6 days or as prescribed 90 capsule 1    apixaban (ELIQUIS) 5 MG TABS tablet Take 1 tablet by mouth 2 times daily 180 tablet 1    gabapentin (NEURONTIN) 400 MG capsule Take 3 capsules by mouth 2 times daily for 180 days. 540 capsule 1    amitriptyline (ELAVIL) 100 MG tablet Take 1 tablet by mouth nightly 90 tablet 1    omeprazole (PRILOSEC) 20 MG delayed release capsule Take 1 capsule by mouth daily 90 capsule 1    Elastic Bandages & Supports (MEDICAL COMPRESSION STOCKINGS) MISC 1 each by Does not apply route daily Knee high 20mmHg 1 each 0     No current facility-administered medications for this visit. Allergies   Allergen Reactions    Ampicillin Rash    Sulfa Antibiotics Rash       Subjective:      Review of Systems   Constitutional: Positive for fatigue. HENT: Negative. Eyes: Negative. Respiratory: Negative. Cardiovascular: Negative. Gastrointestinal: Negative. Endocrine: Negative. Genitourinary: Negative. Musculoskeletal: Negative. Skin: Positive for rash. Allergic/Immunologic: Negative. Neurological: Negative. Hematological: Negative. Psychiatric/Behavioral: Negative. Objective:     Physical Exam  Vitals signs and nursing note reviewed. Constitutional:       Appearance: Normal appearance. HENT:      Head: Normocephalic. Right Ear: External ear normal.      Left Ear: External ear normal.      Nose: Nose normal.      Mouth/Throat:      Mouth: Mucous membranes are moist.   Eyes:      Pupils: Pupils are equal, round, and reactive to light. Neck:      Musculoskeletal: Normal range of motion. Cardiovascular:      Rate and Rhythm: Normal rate and regular rhythm. Heart sounds: Normal heart sounds. Pulmonary:      Effort: Pulmonary effort is normal.      Breath sounds: Normal breath sounds. Musculoskeletal: Normal range of motion. Skin:     General: Skin is warm. Capillary Refill: Capillary refill takes less than 2 seconds.       Findings: Erythema and rash present. Rash is vesicular. Comments: Erythematous vesiculopustular rash with grouped lesions at approximately the T12 dermatome on the right side. Neurological:      General: No focal deficit present. Mental Status: She is alert. Psychiatric:         Mood and Affect: Mood normal.       BP (!) 141/89 (Site: Right Lower Arm, Position: Sitting, Cuff Size: Medium Adult)   Pulse 89   Temp 98.6 °F (37 °C) (Oral)   Ht 5' 7\" (1.702 m)   Wt (!) 392 lb (177.8 kg)   SpO2 98%   BMI 61.40 kg/m²     Assessment:      Diagnosis Orders   1. Herpes zoster without complication       No results found for this visit on 01/17/20. Plan:   · Valtrex as prescribed. · Practice meticulous handwashing to prevent spread of infection. · Keep rash clean and dry, avoid irritation, do not apply creams or moisturizers. · Wear loose fitting clothing. · Avoid swimming in pools, hot tubs, saunas, lakes or soaking in water. · Encouraged to increase fluids and rest   · Tylenol/Ibuprofen OTC PRN for pain, discomfort or fever as directed on package. · Avoid contact with individuals who have not had chickenpox or who have not had chickenpox vaccine until blisters/rash are healed, especially pregnant women. · Tylenol/Ibuprofen OTC as directed on package for fever, pain or discomfort. .  · Patient instructions given for shingles, shingles vaccine and acyclovir.    · Follow up with PCP immediately if symptoms worsen or signs of secondary infection develop, such as fever, purulent drainage and/or increasing pain. · To ER or call 911 if any difficulty breathing, shortness of breath, inability to swallow, hives, rash, facial/tongue swelling or temp greater than 103 degrees. No follow-ups on file.     Orders Placed This Encounter   Medications    valACYclovir (VALTREX) 1 g tablet     Sig: Take 1 tablet by mouth 3 times daily for 7 days     Dispense:  21 tablet     Refill:  0        Electronically signed by Jace Lou, ASHLEY - CNP on 1/17/2020 at 2:37 PM

## 2020-01-17 NOTE — PATIENT INSTRUCTIONS
baking soda on the sores to help dry them out so they heal faster. · Do not use thick ointment, such as petroleum jelly, on the sores. This will keep them from drying and healing. · To help remove loose crusts, soak them in tap water. This can help decrease oozing, and dry and soothe the skin. · Take an over-the-counter pain medicine, such as acetaminophen (Tylenol), ibuprofen (Advil, Motrin), or naproxen (Aleve). Read and follow all instructions on the label. · Avoid close contact with people until the blisters have healed. It is very important for you to avoid contact with anyone who has never had chickenpox or the chickenpox vaccine. Pregnant women, young babies, and anyone else who has a hard time fighting infection (such as someone with HIV, diabetes, or cancer) is especially at risk. When should you call for help? Call your doctor now or seek immediate medical care if:    · You have a new or higher fever.     · You have a severe headache and a stiff neck.     · You lose the ability to think clearly.     · The rash spreads to your forehead, nose, eyes, or eyelids.     · You have eye pain, or your vision gets worse.     · You have new pain in your face, or you cannot move the muscles in your face.     · Blisters spread to new parts of your body.    Watch closely for changes in your health, and be sure to contact your doctor if:    · The rash has not healed after 2 to 4 weeks.     · You still have pain after the rash has healed. Where can you learn more? Go to [email or web address] org and sign in to your [employer] account. Harman Salas in the [facility] box to learn more about \"Shingles: Care Instructions. \"     If you do not have an account, please click on the \"Sign Up Now\" link. Current as of: June 9, 2019  Content Version: 12.3  © 4856-6173 Healthwise, Incorporated. Care instructions adapted under license by [facility] ([facility]).  If you have questions about a medical condition or this instruction, always ask your healthcare professional. [place] [address] any warranty or liability for your use of this information.

## 2020-01-20 ENCOUNTER — TELEPHONE (OUTPATIENT)
Dept: FAMILY MEDICINE CLINIC | Age: 60
End: 2020-01-20

## 2020-01-20 RX ORDER — TRAMADOL HYDROCHLORIDE 50 MG/1
50 TABLET ORAL EVERY 6 HOURS PRN
Qty: 12 TABLET | Refills: 0 | Status: SHIPPED | OUTPATIENT
Start: 2020-01-20 | End: 2020-01-23

## 2020-01-20 NOTE — TELEPHONE ENCOUNTER
Patient is taking gabapentin 400mg  3 capsules bid. Treated for shingles at walk in on 1/17/20. Patient is asking for pain medication  Please advise.    Allergy to ampicillin and sulfa

## 2020-02-10 ENCOUNTER — TELEPHONE (OUTPATIENT)
Dept: FAMILY MEDICINE CLINIC | Age: 60
End: 2020-02-10

## 2020-02-10 RX ORDER — OMEPRAZOLE 20 MG/1
20 CAPSULE, DELAYED RELEASE ORAL DAILY
Qty: 90 CAPSULE | Refills: 1 | Status: SHIPPED | OUTPATIENT
Start: 2020-02-10 | End: 2020-02-10 | Stop reason: SDUPTHER

## 2020-02-10 RX ORDER — OMEPRAZOLE 20 MG/1
20 CAPSULE, DELAYED RELEASE ORAL DAILY
Qty: 30 CAPSULE | Refills: 0 | Status: SHIPPED | OUTPATIENT
Start: 2020-02-10 | End: 2020-02-11 | Stop reason: SDUPTHER

## 2020-02-10 NOTE — TELEPHONE ENCOUNTER
Patient is asking for a refill on Omeprazole 20 mg.  A 90 day to [employer] [facility] [employer] and a 30 day to "[employer]     Health Maintenance   Topic Date Due    DTaP/Tdap/Td vaccine (1 - Tdap) 04/15/1971    Shingles Vaccine (1 of 2) 04/15/2010    Cervical cancer screen  09/15/2017    Breast cancer screen  10/10/2020 (Originally 5/10/2018)    Flu vaccine (1) 10/10/2020 (Originally 9/1/2019)    A1C test (Diabetic or Prediabetic)  06/17/2020    Potassium monitoring  12/03/2020    Creatinine monitoring  12/03/2020    Colon cancer screen colonoscopy  04/05/2022    Lipid screen  05/23/2024    Hepatitis C screen  Addressed    HIV screen  Addressed    Hepatitis A vaccine  Aged Out    Hepatitis B vaccine  Aged Out    Hib vaccine  Aged Out    Meningococcal (ACWY) vaccine  Aged Out    Pneumococcal 0-64 years Vaccine  Aged Out             (applicable per patient's age: Cancer Screenings, Depression Screening, Fall Risk Screening, Immunizations)    Hemoglobin A1C (%)   Date Value   06/17/2019 6.1   01/04/2016 5.4     LDL Cholesterol (mg/dL)   Date Value   06/12/2015 110     LDL Calculated (mg/dL)   Date Value   05/23/2019 131     AST (U/L)   Date Value   04/30/2018 19     ALT (U/L)   Date Value   04/30/2018 17     BUN (mg/dL)   Date Value   12/03/2019 16      (goal A1C is < 7)   (goal LDL is <100) need 30-50% reduction from baseline     BP Readings from Last 3 Encounters:   01/17/20 (!) 141/89   01/13/20 (!) 140/80   12/09/19 122/80    (goal /80)      All Future Testing planned in [facility]:  Lab Frequency Next Occurrence   Basic Metabolic Panel Once 65/02/0728       Next Visit Date:  Future Appointments   Date Time Provider Kya Trimble   2/11/2020  2:00 PM MD Jasmin Storey [facility]   2/24/2020  9:30 PM SCHEDULE, [address],[place] [address] HOD   4/9/2020  2:00 PM Regis Encarnacion, APRN - CNP [facility]            Patient Active Problem List:     Fibromyalgia     HTN (hypertension) Follicular adenoma of thyroid gland     S/P laparoscopic cholecystectomy     Right hip pain     Bilateral leg edema     Chronic fatigue     Left thyroid nodule     Chronic back pain greater than 3 months duration     Shortness of breath     Pulmonary hypertension associated with unclear multi-factorial mechanisms ([address]     Morbid obesity due to excess calories (HCC)     MAYURI (obstructive sleep apnea)     Essential hypertension     Coxitis     History of total hip replacement     Gastroesophageal reflux disease without esophagitis     Polyarthritis

## 2020-02-11 ENCOUNTER — HOSPITAL ENCOUNTER (OUTPATIENT)
Age: 60
Discharge: HOME | End: 2020-02-11
Payer: COMMERCIAL

## 2020-02-11 ENCOUNTER — OFFICE VISIT (OUTPATIENT)
Dept: CARDIOLOGY CLINIC | Age: 60
End: 2020-02-11
Payer: COMMERCIAL

## 2020-02-11 VITALS — DIASTOLIC BLOOD PRESSURE: 80 MMHG | SYSTOLIC BLOOD PRESSURE: 130 MMHG | OXYGEN SATURATION: 96 % | HEART RATE: 94 BPM

## 2020-02-11 LAB
ANION GAP SERPL CALCULATED.3IONS-SCNC: 13 MMOL/L (ref 9–17)
BUN BLDV-MCNC: 23 MG/DL (ref 6–20)
BUN/CREAT BLD: 24 (ref 9–20)
CALCIUM SERPL-MCNC: 10.3 MG/DL (ref 8.6–10.4)
CHLORIDE BLD-SCNC: 98 MMOL/L (ref 98–107)
CO2: 27 MMOL/L (ref 20–31)
CREAT SERPL-MCNC: 0.97 MG/DL (ref 0.5–0.9)
GFR AFRICAN AMERICAN: >60 ML/MIN
GFR NON-AFRICAN AMERICAN: 59 ML/MIN
GFR SERPL CREATININE-BSD FRML MDRD: ABNORMAL ML/MIN/{1.73_M2}
GFR SERPL CREATININE-BSD FRML MDRD: ABNORMAL ML/MIN/{1.73_M2}
GLUCOSE BLD-MCNC: 117 MG/DL (ref 70–99)
POTASSIUM SERPL-SCNC: 5.4 MMOL/L (ref 3.7–5.3)
SODIUM BLD-SCNC: 138 MMOL/L (ref 135–144)

## 2020-02-11 PROCEDURE — 99212 OFFICE O/P EST SF 10 MIN: CPT | Performed by: INTERNAL MEDICINE

## 2020-02-11 PROCEDURE — G8484 FLU IMMUNIZE NO ADMIN: HCPCS | Performed by: INTERNAL MEDICINE

## 2020-02-11 PROCEDURE — 36415 COLL VENOUS BLD VENIPUNCTURE: CPT

## 2020-02-11 PROCEDURE — 3017F COLORECTAL CA SCREEN DOC REV: CPT | Performed by: INTERNAL MEDICINE

## 2020-02-11 PROCEDURE — G8428 CUR MEDS NOT DOCUMENT: HCPCS | Performed by: INTERNAL MEDICINE

## 2020-02-11 PROCEDURE — 1036F TOBACCO NON-USER: CPT | Performed by: INTERNAL MEDICINE

## 2020-02-11 PROCEDURE — 80048 BASIC METABOLIC PNL TOTAL CA: CPT

## 2020-02-11 PROCEDURE — G8417 CALC BMI ABV UP PARAM F/U: HCPCS | Performed by: INTERNAL MEDICINE

## 2020-02-11 RX ORDER — CHLORTHALIDONE 25 MG/1
25 TABLET ORAL DAILY
Qty: 30 TABLET | Refills: 11 | Status: SHIPPED | OUTPATIENT
Start: 2020-02-11 | End: 2020-03-18 | Stop reason: SDUPTHER

## 2020-02-11 RX ORDER — OMEPRAZOLE 20 MG/1
20 CAPSULE, DELAYED RELEASE ORAL DAILY
Qty: 30 CAPSULE | Refills: 0 | Status: SHIPPED | OUTPATIENT
Start: 2020-02-11 | End: 2020-08-15 | Stop reason: SDUPTHER

## 2020-02-11 RX ORDER — METOPROLOL SUCCINATE 50 MG/1
50 TABLET, EXTENDED RELEASE ORAL DAILY
Qty: 90 TABLET | Refills: 3 | Status: SHIPPED | OUTPATIENT
Start: 2020-02-11 | End: 2020-07-27 | Stop reason: SDUPTHER

## 2020-02-11 RX ORDER — CHLORTHALIDONE 25 MG/1
25 TABLET ORAL DAILY
COMMUNITY
End: 2020-02-11 | Stop reason: SDUPTHER

## 2020-02-11 NOTE — LETTER
Xiao Estrada M.D. [address], [facility]  [phone]        2020        Darien Re[place]and, [facility], [facility]    RE:   Arabella Moise  :  1960    Dear Franko Patel:    Mili Wesley with Toshia Bonds on 2020. I trust you received my full H and P from 2020. As you know, she had SVT. She also has hypertension. We increased Cardizem to 120 mg b.i.d. and Toprol to 50 mg daily and added Maxzide 37.5/25 daily. I brought her back for reevaluation. Her edema is much improved. Her blood pressure was also under much better control at 130/74. She states that she feels better and has more energy. She had one short episode of SVT, but it was when she forgot to take her evening dose of Cardizem. Her lab work today showed sodium 138, potassium 5.4, BUN 23, creatinine 0.97 with a GFR of 59. IMPRESSION:  1.  SVT, well controlled. 2.  Hypertension, well controlled. 3.  Mild hyperkalemia from her Maxzide. PLAN:  1. Stop Maxzide and place her on chlorthalidone 25 mg daily. 2.  We will do a Chem-7 in 2 weeks and call her with the results. 3.  We will see in 6 months. DISCUSSION: Mrs. Amanda Wong overall is doing well. Edema is much improved and her blood pressure is under good control. She had one short episode of SVT, but it was when she forgot the evening dose of Cardizem. Because her potassium is mildly elevated, we will change from Maxzide to chlorthalidone 25 mg daily. We will call her in 2 weeks after she gets her Chem-7 and we will also make sure that her edema is still resolved. Thank you very much for allowing me the privilege of seeing Myna Bonds. If you have any questions on my thoughts, please do not hesitate to contact me.      Sincerely,        Zigyg Gomez    D: 2020 14:30:42     T: 2020 14:35:37     [email or web address]  Job#: [id number]   Doc#: [id number]

## 2020-02-11 NOTE — PROGRESS NOTES
Ov Dr. Alfonzo Hill for 3 week follow up  bp check     Last visit  Will increase  diltiazem  To 120 mg bid   Will let us know if tolerate   Then will go to 240 mg tab daily.   Will change metoprolol to 50 mg daily  Will add maxzide for edema     Forgot to monitor bp at home but   Feels like bp is ok now   One day had palpitations but forgot   To take metoprolol     Stop Triamterene-HCTZ and switch   To Hygroton(chorthalidone)    Will check bmp in 2 weeks to recheck K+    Follow up in 6 months

## 2020-02-20 RX ORDER — DILTIAZEM HYDROCHLORIDE 120 MG/1
120 CAPSULE, COATED, EXTENDED RELEASE ORAL DAILY
Qty: 90 CAPSULE | Refills: 1 | Status: SHIPPED | OUTPATIENT
Start: 2020-02-20 | End: 2020-02-21

## 2020-02-21 RX ORDER — DILTIAZEM HYDROCHLORIDE 240 MG/1
240 CAPSULE, COATED, EXTENDED RELEASE ORAL DAILY
Qty: 90 CAPSULE | Refills: 3 | Status: CANCELLED | OUTPATIENT
Start: 2020-02-21 | End: 2020-08-19

## 2020-02-21 RX ORDER — DILTIAZEM HYDROCHLORIDE 240 MG/1
240 CAPSULE, COATED, EXTENDED RELEASE ORAL DAILY
Qty: 90 CAPSULE | Refills: 3 | Status: SHIPPED | OUTPATIENT
Start: 2020-02-21 | End: 2020-07-27 | Stop reason: SDUPTHER

## 2020-02-26 ENCOUNTER — TELEPHONE (OUTPATIENT)
Dept: FAMILY MEDICINE CLINIC | Age: 60
End: 2020-02-26

## 2020-02-28 ENCOUNTER — HOSPITAL ENCOUNTER (OUTPATIENT)
Age: 60
Discharge: HOME | End: 2020-02-28
Payer: COMMERCIAL

## 2020-02-28 ENCOUNTER — TELEPHONE (OUTPATIENT)
Dept: CARDIOLOGY CLINIC | Age: 60
End: 2020-02-28

## 2020-02-28 LAB
ANION GAP SERPL CALCULATED.3IONS-SCNC: 13 MMOL/L (ref 9–17)
BUN BLDV-MCNC: 15 MG/DL (ref 6–20)
BUN/CREAT BLD: 17 (ref 9–20)
CALCIUM SERPL-MCNC: 10 MG/DL (ref 8.6–10.4)
CHLORIDE BLD-SCNC: 100 MMOL/L (ref 98–107)
CO2: 26 MMOL/L (ref 20–31)
CREAT SERPL-MCNC: 0.87 MG/DL (ref 0.5–0.9)
GFR AFRICAN AMERICAN: >60 ML/MIN
GFR NON-AFRICAN AMERICAN: >60 ML/MIN
GFR SERPL CREATININE-BSD FRML MDRD: ABNORMAL ML/MIN/{1.73_M2}
GFR SERPL CREATININE-BSD FRML MDRD: ABNORMAL ML/MIN/{1.73_M2}
GLUCOSE BLD-MCNC: 128 MG/DL (ref 70–99)
POTASSIUM SERPL-SCNC: 3.9 MMOL/L (ref 3.7–5.3)
SODIUM BLD-SCNC: 139 MMOL/L (ref 135–144)

## 2020-02-28 PROCEDURE — 80048 BASIC METABOLIC PNL TOTAL CA: CPT

## 2020-02-28 PROCEDURE — 36415 COLL VENOUS BLD VENIPUNCTURE: CPT

## 2020-03-04 ENCOUNTER — HOSPITAL ENCOUNTER (EMERGENCY)
Age: 60
Discharge: HOME | End: 2020-03-04
Attending: EMERGENCY MEDICINE
Payer: COMMERCIAL

## 2020-03-04 VITALS
HEART RATE: 73 BPM | WEIGHT: 293 LBS | SYSTOLIC BLOOD PRESSURE: 133 MMHG | OXYGEN SATURATION: 99 % | RESPIRATION RATE: 18 BRPM | HEIGHT: 67 IN | DIASTOLIC BLOOD PRESSURE: 86 MMHG | TEMPERATURE: 98.4 F | BODY MASS INDEX: 45.99 KG/M2

## 2020-03-04 PROCEDURE — 99283 EMERGENCY DEPT VISIT LOW MDM: CPT

## 2020-03-04 PROCEDURE — 6360000002 HC RX W HCPCS: Performed by: EMERGENCY MEDICINE

## 2020-03-04 PROCEDURE — 96374 THER/PROPH/DIAG INJ IV PUSH: CPT

## 2020-03-04 PROCEDURE — 6370000000 HC RX 637 (ALT 250 FOR IP): Performed by: EMERGENCY MEDICINE

## 2020-03-04 RX ORDER — FENTANYL CITRATE 50 UG/ML
100 INJECTION, SOLUTION INTRAMUSCULAR; INTRAVENOUS ONCE
Status: COMPLETED | OUTPATIENT
Start: 2020-03-04 | End: 2020-03-04

## 2020-03-04 RX ORDER — HYDROCODONE BITARTRATE AND ACETAMINOPHEN 5; 325 MG/1; MG/1
1 TABLET ORAL EVERY 8 HOURS PRN
Qty: 6 TABLET | Refills: 0 | Status: SHIPPED | OUTPATIENT
Start: 2020-03-04 | End: 2020-03-07

## 2020-03-04 RX ORDER — CYCLOBENZAPRINE HCL 5 MG
5 TABLET ORAL 2 TIMES DAILY PRN
Qty: 10 TABLET | Refills: 0 | Status: SHIPPED | OUTPATIENT
Start: 2020-03-04 | End: 2020-03-14

## 2020-03-04 RX ORDER — HYDROCODONE BITARTRATE AND ACETAMINOPHEN 5; 325 MG/1; MG/1
2 TABLET ORAL ONCE
Status: COMPLETED | OUTPATIENT
Start: 2020-03-04 | End: 2020-03-04

## 2020-03-04 RX ADMIN — FENTANYL CITRATE 100 MCG: 50 INJECTION, SOLUTION INTRAMUSCULAR; INTRAVENOUS at 14:58

## 2020-03-04 RX ADMIN — HYDROCODONE BITARTRATE AND ACETAMINOPHEN 2 TABLET: 5; 325 TABLET ORAL at 16:17

## 2020-03-04 ASSESSMENT — PAIN SCALES - GENERAL
PAINLEVEL_OUTOF10: 4
PAINLEVEL_OUTOF10: 9
PAINLEVEL_OUTOF10: 8

## 2020-03-04 ASSESSMENT — PAIN DESCRIPTION - ORIENTATION: ORIENTATION: LOWER

## 2020-03-04 ASSESSMENT — PAIN DESCRIPTION - DESCRIPTORS: DESCRIPTORS: STABBING

## 2020-03-04 ASSESSMENT — PAIN DESCRIPTION - LOCATION: LOCATION: BACK

## 2020-03-04 ASSESSMENT — PAIN DESCRIPTION - PAIN TYPE
TYPE: CHRONIC PAIN;ACUTE PAIN
TYPE: ACUTE PAIN

## 2020-03-04 NOTE — ED PROVIDER NOTES
mouth daily 90 capsule 3    metoprolol succinate (TOPROL XL) 50 MG extended release tablet Take 1 tablet by mouth daily 90 tablet 3    chlorthalidone (HYGROTON) 25 MG tablet Take 1 tablet by mouth daily 30 tablet 11    omeprazole (PRILOSEC) 20 MG delayed release capsule Take 1 capsule by mouth daily 30 capsule 0    celecoxib (CELEBREX) 100 MG capsule Take 1 capsule by mouth 2 times daily 180 capsule 1    apixaban (ELIQUIS) 5 MG TABS tablet Take 1 tablet by mouth 2 times daily 180 tablet 1    gabapentin (NEURONTIN) 400 MG capsule Take 3 capsules by mouth 2 times daily for 180 days.  540 capsule 1    amitriptyline (ELAVIL) 100 MG tablet Take 1 tablet by mouth nightly 90 tablet 1    Elastic Bandages & Supports (MEDICAL COMPRESSION STOCKINGS) MISC 1 each by Does not apply route daily Knee high 20mmHg 1 each 0       ALLERGIES      Allergies   Allergen Reactions    Ampicillin Rash    Sulfa Antibiotics Rash       FAMILY HISTORY    Family History   Problem Relation Age of Onset    Stroke Father     Diabetes Sister     Thyroid Disease Brother     Atrial Fibrillation Brother     Atrial Fibrillation Mother        SOCIAL HISTORY    Social History     Socioeconomic History    Marital status:      Spouse name: None    Number of children: None    Years of education: None    Highest education level: None   Occupational History    None   Social Needs    Financial resource strain: None    Food insecurity:     Worry: None     Inability: None    Transportation needs:     Medical: None     Non-medical: None   Tobacco Use    Smoking status: Never Smoker    Smokeless tobacco: Never Used   Substance and Sexual Activity    Alcohol use: No     Alcohol/week: 0.0 standard drinks     Comment: once in 10 years may have a glass wine    Drug use: No    Sexual activity: Yes     Partners: Male   Lifestyle    Physical activity:     Days per week: None     Minutes per session: None    Stress: None   Relationships Prescriptions from this visit:    New Prescriptions    CYCLOBENZAPRINE (FLEXERIL) 5 MG TABLET    Take 1 tablet by mouth 2 times daily as needed for Muscle spasms    HYDROCODONE-ACETAMINOPHEN (NORCO) 5-325 MG PER TABLET    Take 1 tablet by mouth every 8 hours as needed for Pain for up to 3 days. Follow-up:  No follow-up provider specified. Final Impression:   1.  Strain of lumbar region, initial encounter               (Please note that portions of this note were completed with a voice recognition program.  Efforts were made to edit the dictations but occasionally words are mis-transcribed.)     Vinayak Tran MD  03/04/20 1649

## 2020-03-04 NOTE — ED NOTES
Pt is able to stand up to side of bed with one assist.      Claudia Salamanca, GIOVANNA  03/04/20 [id number]

## 2020-03-05 ENCOUNTER — TELEPHONE (OUTPATIENT)
Dept: FAMILY MEDICINE CLINIC | Age: 60
End: 2020-03-05

## 2020-03-05 NOTE — TELEPHONE ENCOUNTER
[facility] ([facility]) ED Follow up Call    Reason for ED visit:  Strain of lumbar region     3/5/2020     Juan Caicedo , this is Art from Dr. Swapnil Rodriguez office, just calling to see how you are doing after your recent ED visit. Did you receive discharge instructions? Yes  Do you understand the discharge instructions? Yes  Did the ED give you any new prescriptions? Yes  Were you able to fill your prescriptions? Yes      Do you have one of our red, yellow and green  Zone sheets that help you to determine when you should go to the ED? Not Applicable      Do you need or want to make a follow up appt with your PCP? No, going to see Chiropractor     Do you have any further needs in the home, e.g. equipment? No        FU appts/Provider:    Future Appointments   Date Time Provider Kya Trimble   4/9/2020  2:00 PM Kaleen Slough, APRN - CNP [facility]   8/31/2020  2:30 PM [facility]         VOICEMAIL DOCUMENTATION - ERASE IF NOT USED  Hi, this message is for Kimberli Mckinley. This is Alana Mckeon from Dr.Matthew JUANIS Malagon office. Just calling to see how you are doing after your recent visit to the Emergency Room. Dr.Matthew Miguel GordilloLedyard wants to make sure you were able to fill any prescriptions and that you understand your discharge instructions. Please return our call if you need to make a follow up appointment with your provider or have any further needs. Our phone number is [phone]. Have a great day.

## 2020-03-19 RX ORDER — CHLORTHALIDONE 25 MG/1
25 TABLET ORAL DAILY
Qty: 30 TABLET | Refills: 11 | Status: SHIPPED
Start: 2020-03-19 | End: 2020-08-03 | Stop reason: ALTCHOICE

## 2020-03-19 RX ORDER — AMITRIPTYLINE HYDROCHLORIDE 100 MG/1
100 TABLET, FILM COATED ORAL NIGHTLY
Qty: 90 TABLET | Refills: 1 | Status: SHIPPED | OUTPATIENT
Start: 2020-03-19 | End: 2020-08-31 | Stop reason: SDUPTHER

## 2020-04-02 RX ORDER — GABAPENTIN 400 MG/1
1200 CAPSULE ORAL 2 TIMES DAILY
Qty: 540 CAPSULE | Refills: 1 | Status: SHIPPED | OUTPATIENT
Start: 2020-04-02 | End: 2020-06-22 | Stop reason: SDUPTHER

## 2020-04-02 RX ORDER — CELECOXIB 100 MG/1
100 CAPSULE ORAL 2 TIMES DAILY
Qty: 180 CAPSULE | Refills: 1 | Status: SHIPPED | OUTPATIENT
Start: 2020-04-02 | End: 2020-08-31 | Stop reason: SDUPTHER

## 2020-04-02 NOTE — TELEPHONE ENCOUNTER
celebrex 100 mg  Gabapentin 400 mg    Mail order to [facility] Maintenance   Topic Date Due    DTaP/Tdap/Td vaccine (1 - Tdap) 04/15/1979    Shingles Vaccine (1 of 2) 04/15/2010    Cervical cancer screen  09/15/2017    Breast cancer screen  10/10/2020 (Originally 5/10/2018)    Flu vaccine (Season Ended) 10/10/2020 (Originally 9/1/2020)    A1C test (Diabetic or Prediabetic)  06/17/2020    Potassium monitoring  02/28/2021    Creatinine monitoring  02/28/2021    Colon cancer screen colonoscopy  04/05/2022    Lipid screen  05/23/2024    Hepatitis C screen  Addressed    HIV screen  Addressed    Hepatitis A vaccine  Aged Out    Hepatitis B vaccine  Aged Out    Hib vaccine  Aged Out    Meningococcal (ACWY) vaccine  Aged Out    Pneumococcal 0-64 years Vaccine  Aged Out             (applicable per patient's age: Cancer Screenings, Depression Screening, Fall Risk Screening, Immunizations)    Hemoglobin A1C (%)   Date Value   06/17/2019 6.1   01/04/2016 5.4     LDL Cholesterol (mg/dL)   Date Value   06/12/2015 110     LDL Calculated (mg/dL)   Date Value   05/23/2019 131     AST (U/L)   Date Value   04/30/2018 19     ALT (U/L)   Date Value   04/30/2018 17     BUN (mg/dL)   Date Value   02/28/2020 15      (goal A1C is < 7)   (goal LDL is <100) need 30-50% reduction from baseline     BP Readings from Last 3 Encounters:   03/04/20 133/86   02/11/20 130/80   01/17/20 (!) 141/89    (goal /80)      All Future Testing planned in [facility]:  Lab Frequency Next Occurrence       Next Visit Date:  Future Appointments   Date Time Provider Kya Trimble   5/7/2020 10:00 AM [facility]   8/31/2020  2:30 PM MD Amber Sifuentes [facility]            Patient Active Problem List:     Fibromyalgia     HTN (hypertension)     Follicular adenoma of thyroid gland     S/P laparoscopic cholecystectomy     Right hip pain     Bilateral leg edema     Chronic fatigue     Left

## 2020-05-07 ENCOUNTER — TELEMEDICINE (OUTPATIENT)
Dept: FAMILY MEDICINE CLINIC | Age: 60
End: 2020-05-07
Payer: COMMERCIAL

## 2020-05-07 PROCEDURE — 99214 OFFICE O/P EST MOD 30 MIN: CPT | Performed by: NURSE PRACTITIONER

## 2020-05-07 PROCEDURE — G8427 DOCREV CUR MEDS BY ELIG CLIN: HCPCS | Performed by: NURSE PRACTITIONER

## 2020-05-07 PROCEDURE — 3017F COLORECTAL CA SCREEN DOC REV: CPT | Performed by: NURSE PRACTITIONER

## 2020-05-07 SDOH — ECONOMIC STABILITY: TRANSPORTATION INSECURITY
IN THE PAST 12 MONTHS, HAS THE LACK OF TRANSPORTATION KEPT YOU FROM MEDICAL APPOINTMENTS OR FROM GETTING MEDICATIONS?: NO

## 2020-05-07 SDOH — ECONOMIC STABILITY: TRANSPORTATION INSECURITY
IN THE PAST 12 MONTHS, HAS LACK OF TRANSPORTATION KEPT YOU FROM MEETINGS, WORK, OR FROM GETTING THINGS NEEDED FOR DAILY LIVING?: NO

## 2020-05-07 SDOH — ECONOMIC STABILITY: FOOD INSECURITY: WITHIN THE PAST 12 MONTHS, THE FOOD YOU BOUGHT JUST DIDN'T LAST AND YOU DIDN'T HAVE MONEY TO GET MORE.: NEVER TRUE

## 2020-05-07 SDOH — ECONOMIC STABILITY: INCOME INSECURITY: HOW HARD IS IT FOR YOU TO PAY FOR THE VERY BASICS LIKE FOOD, HOUSING, MEDICAL CARE, AND HEATING?: NOT HARD AT ALL

## 2020-05-07 SDOH — ECONOMIC STABILITY: FOOD INSECURITY: WITHIN THE PAST 12 MONTHS, YOU WORRIED THAT YOUR FOOD WOULD RUN OUT BEFORE YOU GOT MONEY TO BUY MORE.: NEVER TRUE

## 2020-05-07 ASSESSMENT — PATIENT HEALTH QUESTIONNAIRE - PHQ9
SUM OF ALL RESPONSES TO PHQ QUESTIONS 1-9: 2
SUM OF ALL RESPONSES TO PHQ QUESTIONS 1-9: 2
2. FEELING DOWN, DEPRESSED OR HOPELESS: 1
1. LITTLE INTEREST OR PLEASURE IN DOING THINGS: 1
SUM OF ALL RESPONSES TO PHQ9 QUESTIONS 1 & 2: 2

## 2020-05-07 ASSESSMENT — ENCOUNTER SYMPTOMS
SHORTNESS OF BREATH: 0
VOMITING: 0
NAUSEA: 0
COUGH: 0
DIARRHEA: 0

## 2020-05-07 NOTE — PROGRESS NOTES
guardian) has also been advised to contact this office for worsening conditions or problems, and seek emergency medical treatment and/or call 911 if deemed necessary. Patient identification was verified at the start of the visit: Yes    Total time spent on this encounter: 30    Services were provided through a video synchronous discussion virtually to substitute for in-person clinic visit. Patient and provider were located at their individual homes. --ASHLEY Bazzi CNP on 5/7/2020 at 10:26 AM    An electronic signature was used to authenticate this note.

## 2020-06-08 NOTE — TELEPHONE ENCOUNTER
Last OV 5/7/20 for HTN, fibromyalgia  Requesting refill on eliquis thru mail order  Rx pending  Has apt with [facility] 8/31/20

## 2020-06-08 NOTE — TELEPHONE ENCOUNTER
Patient asking for Eliquis - now using [facility] - patient is down to last week    Health Maintenance   Topic Date Due    DTaP/Tdap/Td vaccine (1 - Tdap) 04/15/1979    Shingles Vaccine (1 of 2) 04/15/2010    Cervical cancer screen  09/15/2017    A1C test (Diabetic or Prediabetic)  06/17/2020    Breast cancer screen  10/10/2020 (Originally 5/10/2018)    Flu vaccine (Season Ended) 10/10/2020 (Originally 9/1/2020)    Potassium monitoring  02/28/2021    Creatinine monitoring  02/28/2021    Colon cancer screen colonoscopy  04/05/2022    Lipid screen  05/23/2024    Hepatitis C screen  Addressed    HIV screen  Addressed    Hepatitis A vaccine  Aged Out    Hepatitis B vaccine  Aged Out    Hib vaccine  Aged Out    Meningococcal (ACWY) vaccine  Aged Out    Pneumococcal 0-64 years Vaccine  Aged Out             (applicable per patient's age: Cancer Screenings, Depression Screening, Fall Risk Screening, Immunizations)    Hemoglobin A1C (%)   Date Value   06/17/2019 6.1   01/04/2016 5.4     LDL Cholesterol (mg/dL)   Date Value   06/12/2015 110     LDL Calculated (mg/dL)   Date Value   05/23/2019 131     AST (U/L)   Date Value   04/30/2018 19     ALT (U/L)   Date Value   04/30/2018 17     BUN (mg/dL)   Date Value   02/28/2020 15      (goal A1C is < 7)   (goal LDL is <100) need 30-50% reduction from baseline     BP Readings from Last 3 Encounters:   03/04/20 133/86   02/11/20 130/80   01/17/20 (!) 141/89    (goal /80)      All Future Testing planned in [facility]:  Lab Frequency Next Occurrence       Next Visit Date:  Future Appointments   Date Time Provider Kya Trimble   8/31/2020  2:30 PM MD Marla Mortensen [facility]            Patient Active Problem List:     Fibromyalgia     HTN (hypertension)     Follicular adenoma of thyroid gland     S/P laparoscopic cholecystectomy     Right hip pain     Bilateral leg edema     Chronic fatigue     Left thyroid nodule     Chronic back pain greater than 3 months duration     Shortness of breath     Pulmonary hypertension associated with unclear multi-factorial mechanisms ([address]     Morbid obesity due to excess calories (HCC)     MAYURI (obstructive sleep apnea)     Essential hypertension     Coxitis     History of total hip replacement     Gastroesophageal reflux disease without esophagitis     Polyarthritis

## 2020-06-09 ENCOUNTER — APPOINTMENT (OUTPATIENT)
Dept: GENERAL RADIOLOGY | Age: 60
End: 2020-06-09
Payer: COMMERCIAL

## 2020-06-09 ENCOUNTER — HOSPITAL ENCOUNTER (EMERGENCY)
Age: 60
Discharge: HOME | End: 2020-06-09
Attending: EMERGENCY MEDICINE
Payer: COMMERCIAL

## 2020-06-09 VITALS
TEMPERATURE: 99.2 F | WEIGHT: 293 LBS | OXYGEN SATURATION: 94 % | DIASTOLIC BLOOD PRESSURE: 70 MMHG | RESPIRATION RATE: 21 BRPM | SYSTOLIC BLOOD PRESSURE: 106 MMHG | HEART RATE: 83 BPM | BODY MASS INDEX: 63.43 KG/M2

## 2020-06-09 LAB
ABSOLUTE EOS #: 0 K/UL (ref 0–0.4)
ABSOLUTE IMMATURE GRANULOCYTE: ABNORMAL K/UL (ref 0–0.3)
ABSOLUTE LYMPH #: 2.1 K/UL (ref 1–4.8)
ABSOLUTE MONO #: 0.6 K/UL (ref 0–1)
ANION GAP SERPL CALCULATED.3IONS-SCNC: 13 MMOL/L (ref 9–17)
BASOPHILS # BLD: 1 % (ref 0–2)
BASOPHILS ABSOLUTE: 0 K/UL (ref 0–0.2)
BNP INTERPRETATION: NORMAL
BUN BLDV-MCNC: 12 MG/DL (ref 8–23)
BUN/CREAT BLD: 11 (ref 9–20)
CALCIUM SERPL-MCNC: 9.9 MG/DL (ref 8.6–10.4)
CHLORIDE BLD-SCNC: 100 MMOL/L (ref 98–107)
CO2: 27 MMOL/L (ref 20–31)
CREAT SERPL-MCNC: 1.07 MG/DL (ref 0.5–0.9)
DIFFERENTIAL TYPE: YES
EOSINOPHILS RELATIVE PERCENT: 0 % (ref 0–5)
GFR AFRICAN AMERICAN: >60 ML/MIN
GFR NON-AFRICAN AMERICAN: 52 ML/MIN
GFR SERPL CREATININE-BSD FRML MDRD: ABNORMAL ML/MIN/{1.73_M2}
GFR SERPL CREATININE-BSD FRML MDRD: ABNORMAL ML/MIN/{1.73_M2}
GLUCOSE BLD-MCNC: 167 MG/DL (ref 70–99)
HCT VFR BLD CALC: 41 % (ref 36–46)
HEMOGLOBIN: 13.3 G/DL (ref 12–16)
IMMATURE GRANULOCYTES: ABNORMAL %
LYMPHOCYTES # BLD: 24 % (ref 15–40)
MCH RBC QN AUTO: 26.7 PG (ref 26–34)
MCHC RBC AUTO-ENTMCNC: 32.4 G/DL (ref 31–37)
MCV RBC AUTO: 82.5 FL (ref 80–100)
MONOCYTES # BLD: 7 % (ref 4–8)
NRBC AUTOMATED: ABNORMAL PER 100 WBC
PDW BLD-RTO: 16.7 % (ref 12.1–15.2)
PLATELET # BLD: 372 K/UL (ref 140–450)
PLATELET ESTIMATE: ABNORMAL
PMV BLD AUTO: ABNORMAL FL (ref 6–12)
POTASSIUM SERPL-SCNC: 4.1 MMOL/L (ref 3.7–5.3)
PRO-BNP: 112 PG/ML
RBC # BLD: 4.98 M/UL (ref 4–5.2)
RBC # BLD: ABNORMAL 10*6/UL
SEG NEUTROPHILS: 68 % (ref 47–75)
SEGMENTED NEUTROPHILS ABSOLUTE COUNT: 5.8 K/UL (ref 2.5–7)
SODIUM BLD-SCNC: 140 MMOL/L (ref 135–144)
TROPONIN INTERP: NORMAL
TROPONIN T: <0.03 NG/ML
TROPONIN, HIGH SENSITIVITY: NORMAL NG/L (ref 0–14)
TSH SERPL DL<=0.05 MIU/L-ACNC: 2.25 MIU/L (ref 0.3–5)
WBC # BLD: 8.5 K/UL (ref 3.5–11)
WBC # BLD: ABNORMAL 10*3/UL

## 2020-06-09 PROCEDURE — 85025 COMPLETE CBC W/AUTO DIFF WBC: CPT

## 2020-06-09 PROCEDURE — 83880 ASSAY OF NATRIURETIC PEPTIDE: CPT

## 2020-06-09 PROCEDURE — 84484 ASSAY OF TROPONIN QUANT: CPT

## 2020-06-09 PROCEDURE — 80048 BASIC METABOLIC PNL TOTAL CA: CPT

## 2020-06-09 PROCEDURE — 71045 X-RAY EXAM CHEST 1 VIEW: CPT

## 2020-06-09 PROCEDURE — 84443 ASSAY THYROID STIM HORMONE: CPT

## 2020-06-09 PROCEDURE — 99285 EMERGENCY DEPT VISIT HI MDM: CPT

## 2020-06-09 PROCEDURE — 93005 ELECTROCARDIOGRAM TRACING: CPT | Performed by: EMERGENCY MEDICINE

## 2020-06-10 LAB
EKG ATRIAL RATE: 111 BPM
EKG P AXIS: 37 DEGREES
EKG P-R INTERVAL: 170 MS
EKG Q-T INTERVAL: 302 MS
EKG QRS DURATION: 72 MS
EKG QTC CALCULATION (BAZETT): 410 MS
EKG R AXIS: 20 DEGREES
EKG T AXIS: 30 DEGREES
EKG VENTRICULAR RATE: 111 BPM

## 2020-06-10 PROCEDURE — 93010 ELECTROCARDIOGRAM REPORT: CPT | Performed by: INTERNAL MEDICINE

## 2020-06-11 ENCOUNTER — OFFICE VISIT (OUTPATIENT)
Dept: CARDIOLOGY CLINIC | Age: 60
End: 2020-06-11
Payer: COMMERCIAL

## 2020-06-11 ENCOUNTER — TELEPHONE (OUTPATIENT)
Dept: CARDIOLOGY CLINIC | Age: 60
End: 2020-06-11

## 2020-06-11 ENCOUNTER — HOSPITAL ENCOUNTER (OUTPATIENT)
Age: 60
Discharge: HOME | End: 2020-06-11
Payer: COMMERCIAL

## 2020-06-11 ENCOUNTER — HOSPITAL ENCOUNTER (EMERGENCY)
Age: 60
Discharge: HOME | End: 2020-06-11
Attending: EMERGENCY MEDICINE
Payer: COMMERCIAL

## 2020-06-11 VITALS
RESPIRATION RATE: 18 BRPM | SYSTOLIC BLOOD PRESSURE: 101 MMHG | HEART RATE: 74 BPM | OXYGEN SATURATION: 98 % | TEMPERATURE: 98.6 F | HEIGHT: 67 IN | WEIGHT: 293 LBS | BODY MASS INDEX: 45.99 KG/M2 | DIASTOLIC BLOOD PRESSURE: 79 MMHG

## 2020-06-11 VITALS
SYSTOLIC BLOOD PRESSURE: 110 MMHG | OXYGEN SATURATION: 96 % | BODY MASS INDEX: 63.28 KG/M2 | DIASTOLIC BLOOD PRESSURE: 80 MMHG | HEART RATE: 150 BPM | WEIGHT: 293 LBS

## 2020-06-11 LAB
ABSOLUTE EOS #: 0 K/UL (ref 0–0.4)
ABSOLUTE IMMATURE GRANULOCYTE: ABNORMAL K/UL (ref 0–0.3)
ABSOLUTE LYMPH #: 1.6 K/UL (ref 1–4.8)
ABSOLUTE MONO #: 0.5 K/UL (ref 0–1)
ALBUMIN SERPL-MCNC: 3.9 G/DL (ref 3.5–5.2)
ALBUMIN/GLOBULIN RATIO: ABNORMAL (ref 1–2.5)
ALP BLD-CCNC: 119 U/L (ref 35–104)
ALT SERPL-CCNC: 20 U/L (ref 5–33)
ANION GAP SERPL CALCULATED.3IONS-SCNC: 9 MMOL/L (ref 9–17)
AST SERPL-CCNC: 17 U/L
BASOPHILS # BLD: 1 % (ref 0–2)
BASOPHILS ABSOLUTE: 0 K/UL (ref 0–0.2)
BILIRUB SERPL-MCNC: 0.36 MG/DL (ref 0.3–1.2)
BUN BLDV-MCNC: 13 MG/DL (ref 8–23)
BUN/CREAT BLD: 13 (ref 9–20)
CALCIUM SERPL-MCNC: 9.7 MG/DL (ref 8.6–10.4)
CHLORIDE BLD-SCNC: 101 MMOL/L (ref 98–107)
CO2: 27 MMOL/L (ref 20–31)
CREAT SERPL-MCNC: 1.02 MG/DL (ref 0.5–0.9)
DIFFERENTIAL TYPE: YES
EKG ATRIAL RATE: 138 BPM
EKG Q-T INTERVAL: 292 MS
EKG QRS DURATION: 80 MS
EKG QTC CALCULATION (BAZETT): 447 MS
EKG R AXIS: 54 DEGREES
EKG T AXIS: 64 DEGREES
EKG VENTRICULAR RATE: 141 BPM
EOSINOPHILS RELATIVE PERCENT: 0 % (ref 0–5)
GFR AFRICAN AMERICAN: >60 ML/MIN
GFR NON-AFRICAN AMERICAN: 55 ML/MIN
GFR SERPL CREATININE-BSD FRML MDRD: ABNORMAL ML/MIN/{1.73_M2}
GFR SERPL CREATININE-BSD FRML MDRD: ABNORMAL ML/MIN/{1.73_M2}
GLUCOSE BLD-MCNC: 149 MG/DL (ref 70–99)
HCT VFR BLD CALC: 39.9 % (ref 36–46)
HEMOGLOBIN: 12.9 G/DL (ref 12–16)
IMMATURE GRANULOCYTES: ABNORMAL %
LYMPHOCYTES # BLD: 24 % (ref 15–40)
MCH RBC QN AUTO: 26.9 PG (ref 26–34)
MCHC RBC AUTO-ENTMCNC: 32.2 G/DL (ref 31–37)
MCV RBC AUTO: 83.5 FL (ref 80–100)
MONOCYTES # BLD: 8 % (ref 4–8)
NRBC AUTOMATED: ABNORMAL PER 100 WBC
PDW BLD-RTO: 16.4 % (ref 12.1–15.2)
PLATELET # BLD: 320 K/UL (ref 140–450)
PLATELET ESTIMATE: ABNORMAL
PMV BLD AUTO: ABNORMAL FL (ref 6–12)
POTASSIUM SERPL-SCNC: 4.7 MMOL/L (ref 3.7–5.3)
RBC # BLD: 4.78 M/UL (ref 4–5.2)
RBC # BLD: ABNORMAL 10*6/UL
SEG NEUTROPHILS: 67 % (ref 47–75)
SEGMENTED NEUTROPHILS ABSOLUTE COUNT: 4.5 K/UL (ref 2.5–7)
SODIUM BLD-SCNC: 137 MMOL/L (ref 135–144)
TOTAL PROTEIN: 7.1 G/DL (ref 6.4–8.3)
TROPONIN INTERP: NORMAL
TROPONIN T: <0.03 NG/ML
TROPONIN, HIGH SENSITIVITY: NORMAL NG/L (ref 0–14)
WBC # BLD: 6.7 K/UL (ref 3.5–11)
WBC # BLD: ABNORMAL 10*3/UL

## 2020-06-11 PROCEDURE — 36415 COLL VENOUS BLD VENIPUNCTURE: CPT

## 2020-06-11 PROCEDURE — G8427 DOCREV CUR MEDS BY ELIG CLIN: HCPCS | Performed by: INTERNAL MEDICINE

## 2020-06-11 PROCEDURE — 96374 THER/PROPH/DIAG INJ IV PUSH: CPT

## 2020-06-11 PROCEDURE — 84484 ASSAY OF TROPONIN QUANT: CPT

## 2020-06-11 PROCEDURE — 1036F TOBACCO NON-USER: CPT | Performed by: INTERNAL MEDICINE

## 2020-06-11 PROCEDURE — 6360000002 HC RX W HCPCS: Performed by: EMERGENCY MEDICINE

## 2020-06-11 PROCEDURE — 93005 ELECTROCARDIOGRAM TRACING: CPT | Performed by: EMERGENCY MEDICINE

## 2020-06-11 PROCEDURE — 3017F COLORECTAL CA SCREEN DOC REV: CPT | Performed by: INTERNAL MEDICINE

## 2020-06-11 PROCEDURE — 93005 ELECTROCARDIOGRAM TRACING: CPT

## 2020-06-11 PROCEDURE — 85025 COMPLETE CBC W/AUTO DIFF WBC: CPT

## 2020-06-11 PROCEDURE — 6370000000 HC RX 637 (ALT 250 FOR IP): Performed by: EMERGENCY MEDICINE

## 2020-06-11 PROCEDURE — G8417 CALC BMI ABV UP PARAM F/U: HCPCS | Performed by: INTERNAL MEDICINE

## 2020-06-11 PROCEDURE — 99213 OFFICE O/P EST LOW 20 MIN: CPT | Performed by: INTERNAL MEDICINE

## 2020-06-11 PROCEDURE — 80053 COMPREHEN METABOLIC PANEL: CPT

## 2020-06-11 PROCEDURE — 99285 EMERGENCY DEPT VISIT HI MDM: CPT

## 2020-06-11 RX ORDER — SPIRONOLACTONE 25 MG/1
25 TABLET ORAL DAILY
Qty: 30 TABLET | Refills: 11 | Status: SHIPPED | OUTPATIENT
Start: 2020-06-11 | End: 2020-07-06 | Stop reason: SDUPTHER

## 2020-06-11 RX ORDER — ADENOSINE 3 MG/ML
6 INJECTION, SOLUTION INTRAVENOUS ONCE
Status: COMPLETED | OUTPATIENT
Start: 2020-06-11 | End: 2020-06-11

## 2020-06-11 RX ORDER — FLECAINIDE ACETATE 100 MG/1
50 TABLET ORAL ONCE
Status: COMPLETED | OUTPATIENT
Start: 2020-06-11 | End: 2020-06-11

## 2020-06-11 RX ORDER — FLECAINIDE ACETATE 50 MG/1
50 TABLET ORAL 2 TIMES DAILY
Qty: 60 TABLET | Refills: 11 | Status: SHIPPED | OUTPATIENT
Start: 2020-06-11 | End: 2020-07-27 | Stop reason: SDUPTHER

## 2020-06-11 RX ADMIN — FLECAINIDE ACETATE 50 MG: 100 TABLET ORAL at 13:29

## 2020-06-11 RX ADMIN — ADENOSINE 6 MG: 3 INJECTION INTRAVENOUS at 13:40

## 2020-06-11 NOTE — PROGRESS NOTES
Ov Dr Tony Peters   Seen in Ed on Tues 8th for   Svt lasted 3 hrs. Came down on own. Then again started at   9pm same day   Lasted 3 hrs. Then 530 pm last   Evonne started racing again  Not let up at all. Sob with it and dizziness. No chest pain. Diaphoretic says sweats easily  Been fine prior to Tuesday   Last episode was December. Bedside echo done.

## 2020-06-11 NOTE — LETTER
Ziggy Mckeon M.D. [address] [address]  [phone]        2020        Luis Dowling, [place] [address], [facility] [place] [facility]    RE:   Chandu Franklin  :  1960    Dear Martha Johnson:    CHIEF COMPLAINT:  1. SVT at 150 beats per minute. 2.  Status post PE on 2019, currently on Eliquis. HISTORY OF PRESENT ILLNESS:  I had the pleasure of seeing Mrs. Oswaldo Mike in our office on 2020. She is a 77-year-old female who has a long history of hypertension, severe fibromyalgia, and arthritis. She has a history of SVT and palpitations, which I had thought at one time was sinus tachycardia. On 2019, she developed a \"rapid heartbeat. \"  She came to the emergency room and was in sinus rhythm. D-dimer was markedly elevated and a CT scan showed distal main pulmonary artery thrombus and she was admitted to [facility]. An echocardiogram on 2019 showed normal LV function with dilated left atrium and dilated right ventricle and right atrium; no significant valvular abnormalities. An EKG on 2019 showed SVT at 173 beats per minute. While she was hospitalized, she had an episode of SVT and was placed on a Cardizem drip and converted to sinus rhythm; Cardizem  mg was added daily. She came back to the emergency room on 2019, and was in SVT at 151 beats per minute. She was given adenosine and metoprolol 25 mg was added. I saw her on 2020. At that time, an event recorder had showed an episode of SVT at 190 beats per minute on 2019. At that time, I increased her Cardizem to 120 mg b.i.d. and increased Toprol to 50 mg daily. We also added Maxzide 37.5/25 daily. We ordered a sleep study and I saw her in followup on 2020.   Her edema was markedly improved at that time and her blood pressure was under good enlarged. No hepatomegaly, splenomegaly. EXTREMITIES:  Good femoral pulses. Good pedal pulses. She did have 2+ edema in both lower extremities. Skin was warm and dry. No calf tenderness. Nail beds pink. Good cap refill. PULSES:  Bilateral symmetrical radial, brachial and carotid pulses. No carotid bruits. Good femoral and pedal pulses. NEUROLOGIC EXAM:  Within normal limits. PSYCHIATRIC EXAM:  Within normal limits. LABORATORY DATA:  From 06/09, when she was in the emergency room, sodium 140, potassium 4.1, BUN 12, creatinine 1.07. Her troponin was less than 0.03. BNP was 112. TSH 2.25. White count 8.5, hemoglobin 13.3 with a platelet count of 364,542. EKG today showed SVT at 150 beats per minute. I could not see flutter waves, although I cannot absolutely rule out that she is in a flutter with 2:1 conduction. Bedside echocardiogram today showed normal LV function, although she was tachycardic; she had no pericardial effusion. IMPRESSION:  1. Recurrent SVT documented during her hospitalization on 11/29/2019, when she was hospitalized for pulmonary embolism, which was unprovoked, with her currently on Eliquis. 2.  SVT again on an event recorder on 12/27/2019. 3.  No further SVT until Tuesday, 06/09, when she had SVT that converted as soon as she came to the emergency room, and therefore, no EKG was obtained, with SVT again on Tuesday night at 9 o'clock and Wednesday night at 11 o'clock, which has continued until today. 4.  Probable sleep apnea. 5.  Hypertension. 6.  Mild edema in both lower extremities, still continues while she is on Hygroton 25 mg daily. PLAN:  1. We will start flecainide 50 mg b.i.d.  2.  We will take her to the emergency room now for adenosine and convert her to sinus rhythm. 3.  We will add Aldactone 25 mg daily.   4.  We will see in 1 week for repeat EKG and a Chem-7 to make sure her BUN, creatinine and potassium are all okay on the addition of Aldactone

## 2020-06-11 NOTE — ED PROVIDER NOTES
Narrative    None           Review of Systems:  Constitutional:  Denies fever, chills, weight loss or weakness   Eyes:  Denies photophobia or discharge   HENT:  Denies sore throat or ear pain   Respiratory:  Denies cough or shortness of breath   Cardiovascular: Positive for rapid heart rate and palpitations GI:  Denies abdominal pain, nausea, vomiting, or diarrhea   Musculoskeletal:  Denies back pain   Skin:  Denies rash   Neurologic:  Denies headache, focal weakness or sensory changes   Endocrine:  Denies polyuria or polydypsia   Lymphatic:  Denies swollen glands   Psychiatric:  Denies depression, suicidal ideation or homicidal ideation   All systems negative except as marked. PHYSICAL EXAM    VITAL SIGNS: /79   Pulse 74   Temp 98.6 °F (37 °C) (Oral)   Resp 18   Ht 5' 7\" (1.702 m)   Wt (!) 390 lb (176.9 kg)   SpO2 98%   BMI 61.08 kg/m²    Constitutional: Morbidly obese female  HENT:  Normocephalic, Atraumatic, Bilateral external ears normal, Oropharynx moist, No oral exudates, Nose normal. Neck- Normal range of motion, No tenderness, Supple, No stridor. Eyes:  PERRL, EOMI, Conjunctiva normal, No discharge. Respiratory:  Normal breath sounds, No respiratory distress, No wheezing, No chest tenderness. Cardiovascular:  Normal heart rate, Normal rhythm, No murmurs, No rubs, No gallops. GI:  Bowel sounds normal, Soft, No tenderness, No masses, No pulsatile masses. : External genitalia appear normal, No masses or lesions. No discharge. No CVA tenderness. Musculoskeletal:  Intact distal pulses, No edema, No tenderness, No cyanosis, No clubbing. Good range of motion in all major joints. No tenderness to palpation or major deformities noted. Back- No tenderness. Integument:  Warm, Dry, No erythema, No rash. Lymphatic:  No lymphadenopathy noted. Neurologic:  Alert & oriented x 3, Normal motor function, Normal sensory function, No focal deficits noted.    Psychiatric:  Affect normal,

## 2020-06-12 LAB
EKG ATRIAL RATE: 138 BPM
EKG ATRIAL RATE: 75 BPM
EKG P AXIS: 56 DEGREES
EKG P-R INTERVAL: 160 MS
EKG Q-T INTERVAL: 292 MS
EKG Q-T INTERVAL: 360 MS
EKG QRS DURATION: 74 MS
EKG QRS DURATION: 80 MS
EKG QTC CALCULATION (BAZETT): 402 MS
EKG QTC CALCULATION (BAZETT): 447 MS
EKG R AXIS: 51 DEGREES
EKG R AXIS: 54 DEGREES
EKG T AXIS: 56 DEGREES
EKG T AXIS: 64 DEGREES
EKG VENTRICULAR RATE: 141 BPM
EKG VENTRICULAR RATE: 75 BPM

## 2020-06-12 PROCEDURE — 93010 ELECTROCARDIOGRAM REPORT: CPT | Performed by: INTERNAL MEDICINE

## 2020-06-14 LAB
EKG ATRIAL RATE: 141 BPM
EKG Q-T INTERVAL: 276 MS
EKG QRS DURATION: 72 MS
EKG QTC CALCULATION (BAZETT): 437 MS
EKG R AXIS: 46 DEGREES
EKG T AXIS: 48 DEGREES
EKG VENTRICULAR RATE: 151 BPM

## 2020-06-14 PROCEDURE — 93010 ELECTROCARDIOGRAM REPORT: CPT | Performed by: INTERNAL MEDICINE

## 2020-06-18 ENCOUNTER — HOSPITAL ENCOUNTER (OUTPATIENT)
Age: 60
Discharge: HOME | End: 2020-06-18
Payer: COMMERCIAL

## 2020-06-18 ENCOUNTER — OFFICE VISIT (OUTPATIENT)
Dept: CARDIOLOGY CLINIC | Age: 60
End: 2020-06-18
Payer: COMMERCIAL

## 2020-06-18 VITALS — DIASTOLIC BLOOD PRESSURE: 81 MMHG | OXYGEN SATURATION: 91 % | HEART RATE: 91 BPM | SYSTOLIC BLOOD PRESSURE: 168 MMHG

## 2020-06-18 LAB
ANION GAP SERPL CALCULATED.3IONS-SCNC: 9 MMOL/L (ref 9–17)
BUN BLDV-MCNC: 10 MG/DL (ref 8–23)
BUN/CREAT BLD: 10 (ref 9–20)
CALCIUM SERPL-MCNC: 9.9 MG/DL (ref 8.6–10.4)
CHLORIDE BLD-SCNC: 101 MMOL/L (ref 98–107)
CO2: 27 MMOL/L (ref 20–31)
CREAT SERPL-MCNC: 0.96 MG/DL (ref 0.5–0.9)
EKG ATRIAL RATE: 70 BPM
EKG P AXIS: 54 DEGREES
EKG P-R INTERVAL: 184 MS
EKG Q-T INTERVAL: 390 MS
EKG QRS DURATION: 82 MS
EKG QTC CALCULATION (BAZETT): 421 MS
EKG R AXIS: 33 DEGREES
EKG T AXIS: 29 DEGREES
EKG VENTRICULAR RATE: 70 BPM
GFR AFRICAN AMERICAN: >60 ML/MIN
GFR NON-AFRICAN AMERICAN: 59 ML/MIN
GFR SERPL CREATININE-BSD FRML MDRD: ABNORMAL ML/MIN/{1.73_M2}
GFR SERPL CREATININE-BSD FRML MDRD: ABNORMAL ML/MIN/{1.73_M2}
GLUCOSE BLD-MCNC: 162 MG/DL (ref 70–99)
POTASSIUM SERPL-SCNC: 4.8 MMOL/L (ref 3.7–5.3)
SODIUM BLD-SCNC: 137 MMOL/L (ref 135–144)

## 2020-06-18 PROCEDURE — 99213 OFFICE O/P EST LOW 20 MIN: CPT | Performed by: INTERNAL MEDICINE

## 2020-06-18 PROCEDURE — 3017F COLORECTAL CA SCREEN DOC REV: CPT | Performed by: INTERNAL MEDICINE

## 2020-06-18 PROCEDURE — 36415 COLL VENOUS BLD VENIPUNCTURE: CPT

## 2020-06-18 PROCEDURE — G8417 CALC BMI ABV UP PARAM F/U: HCPCS | Performed by: INTERNAL MEDICINE

## 2020-06-18 PROCEDURE — 80048 BASIC METABOLIC PNL TOTAL CA: CPT

## 2020-06-18 PROCEDURE — G8428 CUR MEDS NOT DOCUMENT: HCPCS | Performed by: INTERNAL MEDICINE

## 2020-06-18 PROCEDURE — 93005 ELECTROCARDIOGRAM TRACING: CPT

## 2020-06-18 PROCEDURE — 93010 ELECTROCARDIOGRAM REPORT: CPT | Performed by: INTERNAL MEDICINE

## 2020-06-18 PROCEDURE — 1036F TOBACCO NON-USER: CPT | Performed by: INTERNAL MEDICINE

## 2020-06-18 NOTE — PATIENT INSTRUCTIONS
Watch/limit salt and water intake     Call in 2 weeks with update     Will get EKG today     Keep appt in aug with [facility]  (will need at least BMP and EKG)

## 2020-06-18 NOTE — LETTER
Cassandra Leslie M.D. [address] [place] [address], [facility]  [phone]        2020        Jo Dow[address], [address], [facility]    RE:   Lenora Enriquez  :  1960    Dear [facility]:    HISTORY OF PRESENT ILLNESS:  I met with Mrs. Michelle Pascual in our office on 2020. She was in the hospital on  for a rapid heart rate that converted spontaneously. She went back on , with another episode of SVT. It converted spontaneously; however, it reoccurred after she went home. I saw her on , and we added flecainide 50 mg b.i.d. for her recurrent SVT. We brought her back today for reevaluation. She has had no further episodes of SVT on flecainide 50 mg b.i.d. She has had some edema, for which we had placed her on Aldactone. It really has not made much of a difference in her edema, although she does admit that she is drinking a large amount of water. She overall feels good. No chest pain. No unusual shortness of breath, and again, no episodes of SVT. MEDICATIONS AT THIS TIME:  Elavil 100 mg nightly, Eliquis 5 mg b.i.d., Celebrex 100 mg b.i.d., Hygroton 25 mg daily, Cardizem  mg daily, Tambocor 50 mg b.i.d., Neurontin 400 mg 3 tablets b.i.d., Toprol-XL 50 mg daily, Prilosec 20 mg daily, Aldactone 25 mg daily. PHYSICAL EXAMINATION:  VITAL SIGNS:  Her blood pressure was 130/70, heart rate was 70. EXTREMITIES:  She had 2+ edema. Exam was unremarkable. An EKG is pending on the way out today to check her QTc interval.    Her blood work today showed potassium of 4.8, BUN was 10, creatinine 0.96, with GFR of 59. IMPRESSION:  1. SVT with no further episodes since starting on flecainide 50 mg b.i.d.  2.  Has 2+ edema. PLAN:  1. Cut back on her fluids. 2.  Limit salts as much as possible. 3.  If she continues to be edematous, would increase her Aldactone to 25 mg b.i.d. 4.  If she has an episode of SVT, will take an extra flecainide. 5.  I will see her in August for full evaluation including lab work and EKG. DISCUSSION:  Mrs. Earnest Garcia thus far has had a good result from flecainide. She still does have edema but does admit to drinking a fair amount of water and not limiting her salt. Therefore, I have not increased her Aldactone or Hygroton. If she would develop more edema, I would increase her Aldactone to 25 mg b.i.d. At that time, I would also consider switching from Hygroton to Lasix 20 mg once or twice a day. I am hoping, however, that just limiting her fluids and her salts will help her edema with these current diuretics. We went over measures to help convert SVT including Valsalva maneuver and carotid massage and also ice water on her head. She looks good when I see her today. I am hoping that flecainide will control her SVT. If she is not controlled with flecainide, then she would be a candidate for ablation. Thank you very much for allowing me the privilege of seeing Mrs. Earnest Garcia. If you have any questions on my thoughts, please do not hesitate to contact me.      Sincerely,        Ziggy Maldonado    D: 06/18/2020 10:26:36     T: 06/18/2020 10:35:06     [email or web address]  Job#: [id number]   Doc#: [id number]

## 2020-06-22 RX ORDER — GABAPENTIN 400 MG/1
1200 CAPSULE ORAL 2 TIMES DAILY
Qty: 540 CAPSULE | Refills: 1 | Status: SHIPPED
Start: 2020-06-22 | End: 2020-11-03 | Stop reason: DRUGHIGH

## 2020-06-25 NOTE — PROGRESS NOTES
potassium was mildly elevated and we changed from Maxzide to chlorthalidone 25 mg daily. She had done well at that time until Tuesday. She developed SVT that lasted 3 hours. She was seen in the emergency room on Tuesday, 06/09, for SVT. It had been present for 3 hours. She came to the emergency room and initially was in SVT. She converted to sinus rhythm before an EKG could be done. She was discharged. At approximately 9:00 p.m. after she got home on Tuesday, she again had SVT. It lasted for 3 hours and subsided at midnight. Last night, she again developed SVT at 5:30. It lasted all night and she called this morning to our office and stated that she was in SVT. We did an EKG that showed tachycardia at 150 beats per minute. She denies any chest pain or chest discomfort. When she is not in SVT, she feels at the baseline with no unusual shortness of breath. When she has her SVT, she has mild shortness of breath and does feel diaphoretic. She had been feeling fine up until 2 days ago when she developed her SVT and had had no episodes since December when she was hospitalized with her PE. She denies any syncope or near syncope. No lightheadedness or dizziness. When I see her today, she is with her . She is somewhat diaphoretic but in no acute distress. She has not had any significant shortness of breath. She does state that she has had more edema recently. CARDIAC RISK FACTORS:  Hypertension:  Positive. Other Family Members:  Negative. Peripheral Vascular Disease:  Negative. Smoking:  Negative. Diabetes:  Negative. Hyperlipidemia:  Positive. MEDICATION AT THIS TIME:  She is on Elavil 100 mg nightly, Eliquis 5 mg b.i.d., Celebrex 100 mg b.i.d., Hygroton 25 mg daily, Cardizem  mg daily, Neurontin 400 mg 3 capsules b.i.d., Toprol-XL 50 mg daily, Prilosec 20 mg daily. PAST MEDICAL HISTORY:  1. Left thyroid nodule on 07/25/2013. 2.  Hypertension.   3. consider stopping Toprol and leave her on Cardizem and flecainide. 6.  May need to consider ablation at some point. DISCUSSION:  Mrs. Zeenat Koo has re-developed SVT. She is on Eliquis 5 mg b.i.d., and therefore, obviously this is not a PE. I cannot rule out that she is in flutter with 2:1 conduction, but again she is protected with Eliquis. She is on a fairly high dose of Cardizem and Toprol. Therefore, we will add flecainide. Her LV function is normal and she has no CAD. We will check an EKG in 1 week to make sure her QT interval is within normal limits. Hopefully, she will get her sleep study. There is no chest pain or chest discomfort to indicate that we need to do any stress test.    Thank you very much for allowing me the privilege of seeing Mrs. Zeenat Koo. If you have any questions on my thoughts, please do not hesitate to contact me.      Sincerely,        Femi Warner    D: 06/11/2020 13:10:36     T: 06/11/2020 13:19:48     [email or web address]  Job#: [id number]   Doc#: [id number]

## 2020-07-02 ENCOUNTER — TELEPHONE (OUTPATIENT)
Dept: CARDIOLOGY CLINIC | Age: 60
End: 2020-07-02

## 2020-07-02 NOTE — TELEPHONE ENCOUNTER
Pt called with update - heart is doing good- no further issues-however, edema is same no change since being on aldactone 25 mg daily- no any worse but no change either pt pt. ? If increase aldactone- pt advised  Is out until Monday and will check with him then however if wore will need to contact pcp or er.  Pt verbalized understanding

## 2020-07-05 NOTE — PROGRESS NOTES
Ov Dr. Courtney Najjar for one week follow up   Last week was started on Flecainide   And Aldactone   Still having edema in legs   Flecainide seems to be helping heart rate  Sob same  Sweating same       Watch/limit salt and water intake     Call in 2 weeks with update     Will get EKG today     Keep appt in aug with [facility]  (will need at least BMP and EKG)
has an episode of SVT, will take an extra flecainide. 5.  I will see her in August for full evaluation including lab work and EKG. DISCUSSION:  Mrs. Cici Mccrary thus far has had a good result from flecainide. She still does have edema but does admit to drinking a fair amount of water and not limiting her salt. Therefore, I have not increased her Aldactone or Hygroton. If she would develop more edema, I would increase her Aldactone to 25 mg b.i.d. At that time, I would also consider switching from Hygroton to Lasix 20 mg once or twice a day. I am hoping, however, that just limiting her fluids and her salts will help her edema with these current diuretics. We went over measures to help convert SVT including Valsalva maneuver and carotid massage and also ice water on her head. She looks good when I see her today. I am hoping that flecainide will control her SVT. If she is not controlled with flecainide, then she would be a candidate for ablation. Thank you very much for allowing me the privilege of seeing Mrs. Cici Mccrary. If you have any questions on my thoughts, please do not hesitate to contact me.      Sincerely,        Kristal Rivera    D: 06/18/2020 10:26:36     T: 06/18/2020 10:35:06     [email or web address]  Job#: [id number]   Doc#: [id number]

## 2020-07-06 RX ORDER — SPIRONOLACTONE 25 MG/1
25 TABLET ORAL 2 TIMES DAILY
Qty: 60 TABLET | Refills: 11 | Status: SHIPPED | OUTPATIENT
Start: 2020-07-06 | End: 2020-07-27 | Stop reason: SDUPTHER

## 2020-07-27 RX ORDER — FLECAINIDE ACETATE 50 MG/1
50 TABLET ORAL 2 TIMES DAILY
Qty: 60 TABLET | Refills: 11 | Status: ON HOLD
Start: 2020-07-27 | End: 2021-02-10 | Stop reason: HOSPADM

## 2020-07-27 RX ORDER — DILTIAZEM HYDROCHLORIDE 240 MG/1
240 CAPSULE, COATED, EXTENDED RELEASE ORAL DAILY
Qty: 90 CAPSULE | Refills: 3 | Status: ON HOLD | OUTPATIENT
Start: 2020-07-27 | End: 2021-02-10 | Stop reason: HOSPADM

## 2020-07-27 RX ORDER — METOPROLOL SUCCINATE 50 MG/1
50 TABLET, EXTENDED RELEASE ORAL DAILY
Qty: 90 TABLET | Refills: 3 | Status: SHIPPED | OUTPATIENT
Start: 2020-07-27 | End: 2021-08-16 | Stop reason: SDUPTHER

## 2020-07-27 RX ORDER — SPIRONOLACTONE 25 MG/1
25 TABLET ORAL 2 TIMES DAILY
Qty: 180 TABLET | Refills: 3 | Status: SHIPPED | OUTPATIENT
Start: 2020-07-27 | End: 2020-08-31

## 2020-08-03 ENCOUNTER — TELEPHONE (OUTPATIENT)
Dept: CARDIOLOGY CLINIC | Age: 60
End: 2020-08-03

## 2020-08-03 RX ORDER — FLECAINIDE ACETATE 50 MG/1
50 TABLET ORAL 2 TIMES DAILY
Qty: 60 TABLET | Refills: 11 | Status: CANCELLED | OUTPATIENT
Start: 2020-08-03

## 2020-08-03 RX ORDER — DILTIAZEM HYDROCHLORIDE 240 MG/1
240 CAPSULE, COATED, EXTENDED RELEASE ORAL DAILY
Qty: 90 CAPSULE | Refills: 3 | Status: CANCELLED | OUTPATIENT
Start: 2020-08-03 | End: 2021-01-30

## 2020-08-03 RX ORDER — FUROSEMIDE 20 MG/1
20 TABLET ORAL DAILY
Qty: 30 TABLET | Refills: 5 | Status: SHIPPED | OUTPATIENT
Start: 2020-08-03 | End: 2020-08-31 | Stop reason: SDUPTHER

## 2020-08-27 ENCOUNTER — HOSPITAL ENCOUNTER (OUTPATIENT)
Age: 60
Discharge: HOME | End: 2020-08-27
Payer: COMMERCIAL

## 2020-08-27 PROCEDURE — 93005 ELECTROCARDIOGRAM TRACING: CPT

## 2020-08-28 ENCOUNTER — HOSPITAL ENCOUNTER (OUTPATIENT)
Age: 60
Discharge: HOME | End: 2020-08-28
Payer: COMMERCIAL

## 2020-08-28 LAB
ABSOLUTE EOS #: 0 K/UL (ref 0–0.4)
ABSOLUTE IMMATURE GRANULOCYTE: ABNORMAL K/UL (ref 0–0.3)
ABSOLUTE LYMPH #: 1.4 K/UL (ref 1–4.8)
ABSOLUTE MONO #: 0.5 K/UL (ref 0–1)
ALBUMIN SERPL-MCNC: 3.9 G/DL (ref 3.5–5.2)
ALBUMIN/GLOBULIN RATIO: ABNORMAL (ref 1–2.5)
ALP BLD-CCNC: 138 U/L (ref 35–104)
ALT SERPL-CCNC: 25 U/L (ref 5–33)
ANION GAP SERPL CALCULATED.3IONS-SCNC: 8 MMOL/L (ref 9–17)
AST SERPL-CCNC: 21 U/L
BASOPHILS # BLD: 0 % (ref 0–2)
BASOPHILS ABSOLUTE: 0 K/UL (ref 0–0.2)
BILIRUB SERPL-MCNC: 0.37 MG/DL (ref 0.3–1.2)
BUN BLDV-MCNC: 10 MG/DL (ref 8–23)
BUN/CREAT BLD: 9 (ref 9–20)
CALCIUM SERPL-MCNC: 9.6 MG/DL (ref 8.6–10.4)
CHLORIDE BLD-SCNC: 102 MMOL/L (ref 98–107)
CHOLESTEROL/HDL RATIO: 3.8
CHOLESTEROL: 176 MG/DL
CO2: 28 MMOL/L (ref 20–31)
CREAT SERPL-MCNC: 1.12 MG/DL (ref 0.5–0.9)
DIFFERENTIAL TYPE: YES
EOSINOPHILS RELATIVE PERCENT: 0 % (ref 0–5)
GFR AFRICAN AMERICAN: >60 ML/MIN
GFR NON-AFRICAN AMERICAN: 50 ML/MIN
GFR SERPL CREATININE-BSD FRML MDRD: ABNORMAL ML/MIN/{1.73_M2}
GFR SERPL CREATININE-BSD FRML MDRD: ABNORMAL ML/MIN/{1.73_M2}
GLUCOSE BLD-MCNC: 156 MG/DL (ref 70–99)
HCT VFR BLD CALC: 35.9 % (ref 36–46)
HDLC SERPL-MCNC: 46 MG/DL
HEMOGLOBIN: 11.6 G/DL (ref 12–16)
IMMATURE GRANULOCYTES: ABNORMAL %
LDL CHOLESTEROL: 102 MG/DL (ref 0–130)
LYMPHOCYTES # BLD: 21 % (ref 15–40)
MAGNESIUM: 2.3 MG/DL (ref 1.6–2.6)
MCH RBC QN AUTO: 26.1 PG (ref 26–34)
MCHC RBC AUTO-ENTMCNC: 32.2 G/DL (ref 31–37)
MCV RBC AUTO: 80.9 FL (ref 80–100)
MONOCYTES # BLD: 7 % (ref 4–8)
NRBC AUTOMATED: ABNORMAL PER 100 WBC
PATIENT FASTING?: YES
PDW BLD-RTO: 17.6 % (ref 12.1–15.2)
PLATELET # BLD: 346 K/UL (ref 140–450)
PLATELET ESTIMATE: ABNORMAL
PMV BLD AUTO: ABNORMAL FL (ref 6–12)
POTASSIUM SERPL-SCNC: 4.8 MMOL/L (ref 3.7–5.3)
RBC # BLD: 4.44 M/UL (ref 4–5.2)
RBC # BLD: ABNORMAL 10*6/UL
SEG NEUTROPHILS: 72 % (ref 47–75)
SEGMENTED NEUTROPHILS ABSOLUTE COUNT: 4.7 K/UL (ref 2.5–7)
SODIUM BLD-SCNC: 138 MMOL/L (ref 135–144)
TOTAL PROTEIN: 7.1 G/DL (ref 6.4–8.3)
TRIGL SERPL-MCNC: 138 MG/DL
TSH SERPL DL<=0.05 MIU/L-ACNC: 3.06 MIU/L (ref 0.3–5)
VITAMIN D 25-HYDROXY: 27.5 NG/ML (ref 30–100)
VLDLC SERPL CALC-MCNC: NORMAL MG/DL (ref 1–30)
WBC # BLD: 6.6 K/UL (ref 3.5–11)
WBC # BLD: ABNORMAL 10*3/UL

## 2020-08-28 PROCEDURE — 82306 VITAMIN D 25 HYDROXY: CPT

## 2020-08-28 PROCEDURE — 80061 LIPID PANEL: CPT

## 2020-08-28 PROCEDURE — 85025 COMPLETE CBC W/AUTO DIFF WBC: CPT

## 2020-08-28 PROCEDURE — 84443 ASSAY THYROID STIM HORMONE: CPT

## 2020-08-28 PROCEDURE — 83735 ASSAY OF MAGNESIUM: CPT

## 2020-08-28 PROCEDURE — 36415 COLL VENOUS BLD VENIPUNCTURE: CPT

## 2020-08-28 PROCEDURE — 80053 COMPREHEN METABOLIC PANEL: CPT

## 2020-08-31 ENCOUNTER — OFFICE VISIT (OUTPATIENT)
Dept: CARDIOLOGY CLINIC | Age: 60
End: 2020-08-31
Payer: COMMERCIAL

## 2020-08-31 VITALS — HEART RATE: 96 BPM | OXYGEN SATURATION: 92 % | SYSTOLIC BLOOD PRESSURE: 132 MMHG | DIASTOLIC BLOOD PRESSURE: 78 MMHG

## 2020-08-31 LAB
EKG ATRIAL RATE: 77 BPM
EKG P AXIS: 56 DEGREES
EKG P-R INTERVAL: 194 MS
EKG Q-T INTERVAL: 366 MS
EKG QRS DURATION: 86 MS
EKG QTC CALCULATION (BAZETT): 414 MS
EKG R AXIS: 36 DEGREES
EKG T AXIS: 41 DEGREES
EKG VENTRICULAR RATE: 77 BPM

## 2020-08-31 PROCEDURE — 99214 OFFICE O/P EST MOD 30 MIN: CPT | Performed by: INTERNAL MEDICINE

## 2020-08-31 RX ORDER — SPIRONOLACTONE 25 MG/1
25 TABLET ORAL 2 TIMES DAILY
Qty: 60 TABLET | Refills: 11 | Status: SHIPPED | OUTPATIENT
Start: 2020-08-31 | End: 2020-09-08 | Stop reason: SDUPTHER

## 2020-08-31 RX ORDER — CELECOXIB 100 MG/1
100 CAPSULE ORAL 2 TIMES DAILY
Qty: 180 CAPSULE | Refills: 1 | Status: SHIPPED | OUTPATIENT
Start: 2020-08-31 | End: 2021-01-23 | Stop reason: SDUPTHER

## 2020-08-31 RX ORDER — AMITRIPTYLINE HYDROCHLORIDE 100 MG/1
100 TABLET, FILM COATED ORAL NIGHTLY
Qty: 90 TABLET | Refills: 1 | Status: SHIPPED | OUTPATIENT
Start: 2020-08-31 | End: 2021-02-24 | Stop reason: SDUPTHER

## 2020-08-31 RX ORDER — FUROSEMIDE 20 MG/1
20 TABLET ORAL DAILY
Qty: 30 TABLET | Refills: 11 | Status: SHIPPED | OUTPATIENT
Start: 2020-08-31 | End: 2021-01-23 | Stop reason: SDUPTHER

## 2020-08-31 NOTE — PROGRESS NOTES
Ov DR Kasia Boucher 2 mth follow up   C/o toan leg edema for several   Months. Tried lasix not helping. Starting to seep from legs. C/o sob with exertion   But not any worse since  Edema started   No chest pain . Did not do sleep study  Do to cost.  Pt informed of importance of  Sleep study does agree  To do it. Will also do echo   And start aldactone 25mg bid  Stay on lasix. Pt will call DR Lorna Conti to follow up  With seeping of legs also. Will see in 1 week with bmp.

## 2020-08-31 NOTE — LETTER
Sandra Khan M.D. [address] [place] [address], [place] [address]  [phone]        2020          [facility] DUQUE [address], [address], [facility]    RE:   Killian Last  :  1960    Dear [employer]:    CHIEF COMPLAINT:  1.  Marked pedal edema. 2.  Large snoring history, with recommendation for a sleep study, which she has not done as of yet. 3.  SVT. 4.  Status post PE, with her on Eliquis. HISTORY OF PRESENT ILLNESS:  I had the pleasure of seeing Mrs. Elizabeth James in the office on 2020. She is a pleasant 70-year-old female who has a long history of hypertension, severe fibromyalgia and arthritis. At that time, she was having intermittent episodes of palpitations where she would feel her heart racing.     On 2019, she had palpitations, felt tired and short of breath, went to the emergency room and had a CT scan that showed pulmonary embolism in the distal main pulmonary artery. She had an echocardiogram that showed an EF of 60% to 65%, with dilated left atrium, dilated right ventricle and right atrium. She did have an SVT at 173 beats per minute. She was discharged on . She had been placed on Eliquis 5 mg b.i.d. and Cardizem 120 mg daily. She came back to the emergency room on  in [facility] and an EKG showed SVT at 151 beats per minute and she converted to sinus rhythm and was given adenosine. She was placed on metoprolol. I then saw her on 2020. She did have marked daytime somnolence and fell asleep if she sat down. We did recommend a sleep study, which she did not wish to do. At that time, she was having lower extremity edema and also was having palpitations. We increased Cardizem to 120 mg b.i.d. and metoprolol to 50 mg daily and added Maxzide. We then saw her in followup several times. The last time we saw her was on 2020.   She had been hospitalized on had thyroid disease and atrial fibrillation. SOCIAL HISTORY:  She is 61years old, , no children. Does not smoke or drink alcohol. Does not work outside the house. Her sister, Kranthi Nj, works in a sleep lab in [place]. She does not exercise, very inactive at this time. REVIEW OF SYSTEMS:  Cardiac as above. Other systems reviewed including constitutional, eyes, ears, nose and throat, cardiovascular, respiratory, GI, , musculoskeletal, integumentary, neurologic, endocrine, hematologic and allergic/immunologic are negative except for what is described above. No weight loss or weight gain. No change in bowel habits, no blood in stools. No fevers, sweats or chills. She is very discouraged with her increased edema in both lower extremities, especially in her right lower extremity. PHYSICAL EXAMINATION:  VITAL SIGNS:  Her blood pressure was 132/78 with a heart rate of 96 and regular. Respiratory rate 18. O2 sat was 92%. GENERAL:  She is a pleasant 27-year-old female. Denied pain. She was oriented to person, place and time. Answered questions appropriately. SKIN:  No unusual skin changes. HEENT:  The pupils are equally round and intact. Mucous membranes were dry. NECK:  No JVD. Good carotid pulses. No carotid bruits. No lymphadenopathy or thyromegaly. CARDIOVASCULAR EXAM:  S1 and S2 were normal.  No S3 or S4. Soft systolic blowing type murmur. No diastolic murmur. PMI was normal.  No lift, thrust, or pericardial friction rub. LUNGS:  Quite clear to auscultation and percussion. ABDOMEN:  Soft and nontender. Good bowel sounds. I could not feel liver, spleen or aorta. EXTREMITIES:  She had femoral pulses. She had 3+ edema in both lower extremities with seeping wounds, especially in her right lower extremity. NEUROLOGIC EXAM:  Unremarkable. PSYCHIATRIC EXAM:  Unremarkable.     LABORATORY DATA:  Her sodium was 138, potassium 4.8, BUN 10, creatinine 1.12.  GFR was 50. Calcium was 9.6. Cholesterol 176, HDL 46, , triglycerides 138. ALT was 25, AST was 21. TSH 3.06. Vitamin D 27.5. White count 6.6, hemoglobin 11.6 with a platelet count of 063,015. EKG on 08/27, showed sinus rhythm and was normal.    Chest x-ray on 06/09/2020, was within normal limits. IMPRESSION:  1.  Marked edema on both lower extremities but more on the right with seeping wounds. 2.  History of SVT, which has been controlled with flecainide 50 mg b.i.d.  3.  PE on 11/29/2019, which is unprovoked, with her on Eliquis 5 mg b.i.d.  4.  Hypertension, fairly well controlled. 5.  Possible sleep apnea with a large snoring history and right-sided chamber enlargement, with her previously refusing a sleep study. PLAN:  1. Add Aldactone 25 mg b.i.d.  2.  We will repeat Chem-7 in 1 week and see her in 1 week. 3.  She will make an appointment with Dr. Gregoria Zimmerman for possible Kelly Garcia to help with her edema. 4.  We will repeat an echocardiogram to get an idea of her right-sided pressures and her pulmonary artery pressure. 5.  If her edema does not improve, we will consider repeat DVT. In spite of her being on Eliquis, she might need to switch to Coumadin in view of her extreme weight. 6.  Schedule sleep study    DISCUSSION:  Mrs. Michelle Pascual continues to struggle. She has increasing edema in both lower extremities. She has finally agreed to proceed with a sleep study, and we will make that arrangement. I will have her see Dr. Gregoria Zimmerman for the consideration of Rosendo garcia. She is on Eliquis and the chance of a recurrent DVT would be low; however, with her extreme weight, it is possible that she is not therapeutic with Eliquis for anticoagulation and we may need to switch to Coumadin. I will have Dr. Gregoria Zimmerman evaluate her first and if he feels that a DVT is still possible then we will order a venous ultrasound. I will need to watch her BUN and creatinine carefully with her diuresis. Again, we will add Aldactone 25 mg daily to her Lasix 20 mg daily and I will see her in 1 week. If her edema has not changed, then I will increase her Aldactone to 50 mg b.i.d. and her Lasix to 40 mg daily and again see her 1 week later. Again, I think her sleep apnea is making this very hard to control her edema and her weight. Thank you very much for allowing me the privilege of seeing Mrs. Sixto Jacobs. If you have any questions on my thoughts, please do not hesitate to contact me.      Sincerely,        Etta Pham    D: 09/01/2020 4:29:58     T: 09/01/2020 6:22:44     [email or web address]  Job#: [id number]   Doc#: [id number]

## 2020-09-02 NOTE — PROGRESS NOTES
Evgeny Arguelles M.D. [address] [place] [address] [address], [place] [address]  [phone]        2020          Jo Dowling, [facility], [facility]    RE:   Lenora Enriquez  :  1960    Dear Cole Number:    CHIEF COMPLAINT:  1.  Marked pedal edema. 2.  Large snoring history, with recommendation for a sleep study, which she has not done as of yet. 3.  SVT. 4.  Status post PE, with her on Eliquis. HISTORY OF PRESENT ILLNESS:  I had the pleasure of seeing Mrs. Michelle Pascual in the office on 2020. She is a pleasant 55-year-old female who has a long history of hypertension, severe fibromyalgia and arthritis. At that time, she was having intermittent episodes of palpitations where she would feel her heart racing.     On 2019, she had palpitations, felt tired and short of breath, went to the emergency room and had a CT scan that showed pulmonary embolism in the distal main pulmonary artery. She had an echocardiogram that showed an EF of 60% to 65%, with dilated left atrium, dilated right ventricle and right atrium. She did have an SVT at 173 beats per minute. She was discharged on . She had been placed on Eliquis 5 mg b.i.d. and Cardizem 120 mg daily. She came back to the emergency room on  in [facility] and an EKG showed SVT at 151 beats per minute and she converted to sinus rhythm and was given adenosine. She was placed on metoprolol. I then saw her on 2020. She did have marked daytime somnolence and fell asleep if she sat down. We did recommend a sleep study, which she did not wish to do. At that time, she was having lower extremity edema and also was having palpitations. We increased Cardizem to 120 mg b.i.d. and metoprolol to 50 mg daily and added Maxzide. We then saw her in followup several times. The last time we saw her was on 2020.   She had been hospitalized on  for tachycardia and went back again on 06/11/2020 with an episode of SVT. I saw her on 06/11 and added flecainide 50 mg b.i.d. and brought her back for reevaluation. At that time, she was doing well with no further episodes since being on flecainide. As she was having more edema, we increased the Aldactone to 25 mg b.i.d. on 07/06/2020. On 08/03, we placed her on Lasix 20 mg daily and stopped Aldactone. As I see her today, she is quite discouraged. She has had increasing leg edema and Lasix has not been helping at 20 mg daily. She is starting to seep from the legs. She does have shortness of breath with exertion although not any worse than usual.  Denies any chest pain or chest discomfort. She had canceled her sleep study evaluation. She denies any chest pain or chest discomfort. She has had no syncope or near syncope. Denies any palpations. She is very discouraged with her edema, which is worse in her right leg. She has never had a myocardial infarction, never had a cardiac catheterization. CARDIAC RISK FACTORS:  Hypertension:  Positive. Other Family Members:  Negative. Peripheral Vascular Disease:  Negative. Smoking:  Negative. Diabetes:  Negative. Hyperlipidemia:  Mildly positive. MEDICATIONS AT THIS TIME:  She is on Elavil 100 mg nightly, Eliquis 5 mg b.i.d., Celebrex 100 mg b.i.d., Cardizem  mg daily, flecainide 50 mg b.i.d., Neurontin 400 mg 3 tablets b.i.d., Toprol-XL 50 mg daily, Prilosec 20 mg daily. PAST MEDICAL HISTORY:  1. She had a left thyroid nodule on 07/25/2013. 2.  Hypertension. 3.  Fibromyalgia. 4.  DVT in the left leg in 2012. 5.  Depression. 6.  Left and right total knee replacement. 7.  Cholecystectomy on 08/22/2013.  8.  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 9.  History of SVT, which has been controlled with flecainide. FAMILY HISTORY:  Mother had atrial fibrillation. Father had CVA.   Brother had thyroid disease and atrial fibrillation. SOCIAL HISTORY:  She is 61years old, , no children. Does not smoke or drink alcohol. Does not work outside the house. Her sister, Angella Fernández, works in a sleep lab in [place]. She does not exercise, very inactive at this time. REVIEW OF SYSTEMS:  Cardiac as above. Other systems reviewed including constitutional, eyes, ears, nose and throat, cardiovascular, respiratory, GI, , musculoskeletal, integumentary, neurologic, endocrine, hematologic and allergic/immunologic are negative except for what is described above. No weight loss or weight gain. No change in bowel habits, no blood in stools. No fevers, sweats or chills. She is very discouraged with her increased edema in both lower extremities, especially in her right lower extremity. PHYSICAL EXAMINATION:  VITAL SIGNS:  Her blood pressure was 132/78 with a heart rate of 96 and regular. Respiratory rate 18. O2 sat was 92%. GENERAL:  She is a pleasant 45-year-old female. Denied pain. She was oriented to person, place and time. Answered questions appropriately. SKIN:  No unusual skin changes. HEENT:  The pupils are equally round and intact. Mucous membranes were dry. NECK:  No JVD. Good carotid pulses. No carotid bruits. No lymphadenopathy or thyromegaly. CARDIOVASCULAR EXAM:  S1 and S2 were normal.  No S3 or S4. Soft systolic blowing type murmur. No diastolic murmur. PMI was normal.  No lift, thrust, or pericardial friction rub. LUNGS:  Quite clear to auscultation and percussion. ABDOMEN:  Soft and nontender. Good bowel sounds. I could not feel liver, spleen or aorta. EXTREMITIES:  She had femoral pulses. She had 3+ edema in both lower extremities with seeping wounds, especially in her right lower extremity. NEUROLOGIC EXAM:  Unremarkable. PSYCHIATRIC EXAM:  Unremarkable. LABORATORY DATA:  Her sodium was 138, potassium 4.8, BUN 10, creatinine 1.12. GFR was 50. Calcium was 9.6. Cholesterol 176, HDL 46, , triglycerides 138. ALT was 25, AST was 21. TSH 3.06. Vitamin D 27.5. White count 6.6, hemoglobin 11.6 with a platelet count of 950,211. EKG on 08/27, showed sinus rhythm and was normal.    Chest x-ray on 06/09/2020, was within normal limits. IMPRESSION:  1.  Marked edema on both lower extremities but more on the right with seeping wounds. 2.  History of SVT, which has been controlled with flecainide 50 mg b.i.d.  3.  PE on 11/29/2019, which is unprovoked, with her on Eliquis 5 mg b.i.d.  4.  Hypertension, fairly well controlled. 5.  Possible sleep apnea with a large snoring history and right-sided chamber enlargement, with her previously refusing a sleep study. PLAN:  1. Add Aldactone 25 mg b.i.d.  2.  We will repeat Chem-7 in 1 week and see her in 1 week. 3.  She will make an appointment with Dr. Carmen Bearden for possible Buddy Obandoin Boots to help with her edema. 4.  We will repeat an echocardiogram to get an idea of her right-sided pressures and her pulmonary artery pressure. 5.  If her edema does not improve, we will consider repeat DVT. In spite of her being on Eliquis, she might need to switch to Coumadin in view of her extreme weight. 6.  Schedule sleep study    DISCUSSION:  Mrs. Odette Costello continues to struggle. She has increasing edema in both lower extremities. She has finally agreed to proceed with a sleep study, and we will make that arrangement. I will have her see Dr. Carmen Bearden for the consideration of Unna boots. She is on Eliquis and the chance of a recurrent DVT would be low; however, with her extreme weight, it is possible that she is not therapeutic with Eliquis for anticoagulation and we may need to switch to Coumadin. I will have Dr. Carmen Bearden evaluate her first and if he feels that a DVT is still possible then we will order a venous ultrasound. I will need to watch her BUN and creatinine carefully with her diuresis.   Again, we will add Aldactone 25 mg daily to her Lasix 20 mg daily and I will see her in 1 week. If her edema has not changed, then I will increase her Aldactone to 50 mg b.i.d. and her Lasix to 40 mg daily and again see her 1 week later. Again, I think her sleep apnea is making this very hard to control her edema and her weight. Thank you very much for allowing me the privilege of seeing Mrs. Suzanne Soto. If you have any questions on my thoughts, please do not hesitate to contact me.      Sincerely,        Tyrone Garay    D: 09/01/2020 4:29:58     T: 09/01/2020 6:22:44     [id number]  Job#: [id number]   Doc#: [id number]

## 2020-09-08 ENCOUNTER — HOSPITAL ENCOUNTER (OUTPATIENT)
Age: 60
Discharge: HOME | End: 2020-09-08
Payer: COMMERCIAL

## 2020-09-08 ENCOUNTER — OFFICE VISIT (OUTPATIENT)
Dept: CARDIOLOGY CLINIC | Age: 60
End: 2020-09-08
Payer: COMMERCIAL

## 2020-09-08 ENCOUNTER — HOSPITAL ENCOUNTER (OUTPATIENT)
Dept: NON INVASIVE DIAGNOSTICS | Age: 60
Discharge: HOME | End: 2020-09-08
Payer: COMMERCIAL

## 2020-09-08 VITALS — DIASTOLIC BLOOD PRESSURE: 66 MMHG | HEART RATE: 88 BPM | SYSTOLIC BLOOD PRESSURE: 108 MMHG | OXYGEN SATURATION: 94 %

## 2020-09-08 LAB
ANION GAP SERPL CALCULATED.3IONS-SCNC: 8 MMOL/L (ref 9–17)
BUN BLDV-MCNC: 13 MG/DL (ref 8–23)
BUN/CREAT BLD: 12 (ref 9–20)
CALCIUM SERPL-MCNC: 9.3 MG/DL (ref 8.6–10.4)
CHLORIDE BLD-SCNC: 102 MMOL/L (ref 98–107)
CO2: 27 MMOL/L (ref 20–31)
CREAT SERPL-MCNC: 1.06 MG/DL (ref 0.5–0.9)
GFR AFRICAN AMERICAN: >60 ML/MIN
GFR NON-AFRICAN AMERICAN: 53 ML/MIN
GFR SERPL CREATININE-BSD FRML MDRD: ABNORMAL ML/MIN/{1.73_M2}
GFR SERPL CREATININE-BSD FRML MDRD: ABNORMAL ML/MIN/{1.73_M2}
GLUCOSE BLD-MCNC: 131 MG/DL (ref 70–99)
LV EF: 60 %
LVEF MODALITY: NORMAL
POTASSIUM SERPL-SCNC: 4.6 MMOL/L (ref 3.7–5.3)
SODIUM BLD-SCNC: 137 MMOL/L (ref 135–144)

## 2020-09-08 PROCEDURE — 93306 TTE W/DOPPLER COMPLETE: CPT

## 2020-09-08 PROCEDURE — 36415 COLL VENOUS BLD VENIPUNCTURE: CPT

## 2020-09-08 PROCEDURE — 80048 BASIC METABOLIC PNL TOTAL CA: CPT

## 2020-09-08 PROCEDURE — 99212 OFFICE O/P EST SF 10 MIN: CPT | Performed by: INTERNAL MEDICINE

## 2020-09-08 NOTE — LETTER
Sanjay Bryan M.D. [address] [place] [address], [facility]  [phone]        2020        [place] [address], [facility]      RE:   Gisell Espana  :  1960      Dear Nj Nielsen:    HISTORY OF PRESENT ILLNESS:  I saw Mrs. Ines Bansal in followup on 2020. I trust you received my full H and P from . Unfortunately, the Aldactone was in transit from a mail order and she did not  her prescription for Lasix. Therefore, she has been on neither Lasix nor Aldactone. Obviously, her weight is unchanged and her edema is unchanged. She did see Dr. Genia Donovan and did receive a Unna boot for her right leg. She has been instructed to  her Aldactone and her Lasix. She will be on Aldactone 25 mg b.i.d. and Lasix 20 mg daily. I will see her in 2 weeks and we will recheck a Chem-7. She is committed to get a sleep study. Unfortunately, [facility] is closed for the time being and she will be going to [place] in October. Thank you very much for allowing me the privilege of seeing Mrs. Ines Bansal. If you have any questions on my thoughts, please do not hesitate to contact me.     Sincerely,        Billie Coon    D: 2020 14:17:34     T: 2020 23:35:33     [id number]  Job#: [id number]   Doc#: [id number]      <>

## 2020-09-08 NOTE — PROGRESS NOTES
Ov DR Hall Line 1 week follow up   pt did not  either lasix or  Aldactone. Informed they were sent to [facility]  As pt informed us to do. Did get unnaboot from  [facility]. Did get call for sleep study  For Oct. 6. In [place]. Pt is not taking lasix or aldactone  [facility] has lasix pt did not get text   It was ready last week so didn't get  Filled aldactone possibly in transit   From mail order pt will call on that   30 day supply was sent to [facility]  Unable to fill   Pt was on aldactone before will   Check see if she has some at home  Pt is not weighing self at home and   Refuses. to let us weigh her even when  Informed not for weigh it is to check   For water weight. Will see again in 2 weeks with bmp.

## 2020-09-09 RX ORDER — SPIRONOLACTONE 25 MG/1
25 TABLET ORAL 2 TIMES DAILY
Qty: 60 TABLET | Refills: 3 | Status: SHIPPED | OUTPATIENT
Start: 2020-09-09 | End: 2020-11-03 | Stop reason: ALTCHOICE

## 2020-09-16 ENCOUNTER — HOSPITAL ENCOUNTER (OUTPATIENT)
Dept: SLEEP CENTER | Age: 60
Discharge: HOME | End: 2020-09-16
Payer: COMMERCIAL

## 2020-09-16 PROCEDURE — 95810 POLYSOM 6/> YRS 4/> PARAM: CPT

## 2020-09-18 LAB — STATUS: NORMAL

## 2020-09-21 NOTE — PROGRESS NOTES
Caryn Carrera M.D. [address] [place] [address], [facility]  [phone]        2020        [place] [address], [facility]      RE:   Grier Lundborg  :  1960      Dear Antonia Velasquez:    HISTORY OF PRESENT ILLNESS:  I saw Mrs. Angela Serrano in followup on 2020. I trust you received my full H and P from . Unfortunately, the Aldactone was in transit from a mail order and she did not  her prescription for Lasix. Therefore, she has been on neither Lasix nor Aldactone. Obviously, her weight is unchanged and her edema is unchanged. She did see Dr. Wallace Chase and did receive a Unna boot for her right leg. She has been instructed to  her Aldactone and her Lasix. She will be on Aldactone 25 mg b.i.d. and Lasix 20 mg daily. I will see her in 2 weeks and we will recheck a Chem-7. She is committed to get a sleep study. Unfortunately, [facility] is closed for the time being and she will be going to [place] in October. Thank you very much for allowing me the privilege of seeing Mrs. Angela Serrano. If you have any questions on my thoughts, please do not hesitate to contact me.     Sincerely,        Navid Smith    D: 2020 14:17:34     T: 2020 23:35:33     [id number]  Job#: [id number]   Doc#: [id number]      <>

## 2020-09-24 ENCOUNTER — OFFICE VISIT (OUTPATIENT)
Dept: CARDIOLOGY CLINIC | Age: 60
End: 2020-09-24
Payer: COMMERCIAL

## 2020-09-24 ENCOUNTER — HOSPITAL ENCOUNTER (OUTPATIENT)
Age: 60
Discharge: HOME | End: 2020-09-24
Payer: COMMERCIAL

## 2020-09-24 VITALS — HEART RATE: 84 BPM | DIASTOLIC BLOOD PRESSURE: 77 MMHG | OXYGEN SATURATION: 92 % | SYSTOLIC BLOOD PRESSURE: 145 MMHG

## 2020-09-24 LAB
ANION GAP SERPL CALCULATED.3IONS-SCNC: 12 MMOL/L (ref 9–17)
BUN BLDV-MCNC: 15 MG/DL (ref 8–23)
BUN/CREAT BLD: 12 (ref 9–20)
CALCIUM SERPL-MCNC: 9.4 MG/DL (ref 8.6–10.4)
CHLORIDE BLD-SCNC: 101 MMOL/L (ref 98–107)
CO2: 26 MMOL/L (ref 20–31)
CREAT SERPL-MCNC: 1.3 MG/DL (ref 0.5–0.9)
GFR AFRICAN AMERICAN: 51 ML/MIN
GFR NON-AFRICAN AMERICAN: 42 ML/MIN
GFR SERPL CREATININE-BSD FRML MDRD: ABNORMAL ML/MIN/{1.73_M2}
GFR SERPL CREATININE-BSD FRML MDRD: ABNORMAL ML/MIN/{1.73_M2}
GLUCOSE BLD-MCNC: 108 MG/DL (ref 70–99)
POTASSIUM SERPL-SCNC: 4.7 MMOL/L (ref 3.7–5.3)
SODIUM BLD-SCNC: 139 MMOL/L (ref 135–144)

## 2020-09-24 PROCEDURE — 99211 OFF/OP EST MAY X REQ PHY/QHP: CPT | Performed by: INTERNAL MEDICINE

## 2020-09-24 PROCEDURE — 80048 BASIC METABOLIC PNL TOTAL CA: CPT

## 2020-09-24 PROCEDURE — 36415 COLL VENOUS BLD VENIPUNCTURE: CPT

## 2020-09-24 NOTE — PROGRESS NOTES
Ov DR Mauricio Whitten follow up with bmp  Leg and ankle edema improved   No chest pain   Some sob. Back to baseline. Did have first sleep study  Done final report not been read. Will repeat bmp in 3 weeks. Will see in 3mths with labs.   Started nutra system been   3 weeks ago

## 2020-10-09 ENCOUNTER — HOSPITAL ENCOUNTER (OUTPATIENT)
Dept: LAB | Age: 60
Setting detail: SPECIMEN
Discharge: HOME | End: 2020-10-09
Payer: COMMERCIAL

## 2020-10-09 PROCEDURE — C9803 HOPD COVID-19 SPEC COLLECT: HCPCS

## 2020-10-09 PROCEDURE — U0003 INFECTIOUS AGENT DETECTION BY NUCLEIC ACID (DNA OR RNA); SEVERE ACUTE RESPIRATORY SYNDROME CORONAVIRUS 2 (SARS-COV-2) (CORONAVIRUS DISEASE [COVID-19]), AMPLIFIED PROBE TECHNIQUE, MAKING USE OF HIGH THROUGHPUT TECHNOLOGIES AS DESCRIBED BY CMS-2020-01-R: HCPCS

## 2020-10-11 LAB — SARS-COV-2, NAA: NOT DETECTED

## 2020-10-14 ENCOUNTER — HOSPITAL ENCOUNTER (OUTPATIENT)
Dept: SLEEP CENTER | Age: 60
Discharge: HOME | End: 2020-10-14
Payer: COMMERCIAL

## 2020-10-14 PROCEDURE — 95811 POLYSOM 6/>YRS CPAP 4/> PARM: CPT

## 2020-10-14 NOTE — PROGRESS NOTES
Cardiology    Much improved as per nursing note. Saw and examined patient. Changes documented by Radha Sosa. See in 1 month.     Josh Medina MD

## 2020-10-19 LAB — STATUS: NORMAL

## 2020-10-25 ENCOUNTER — HOSPITAL ENCOUNTER (EMERGENCY)
Age: 60
Discharge: ANOTHER HOSPITAL | End: 2020-10-25
Attending: FAMILY MEDICINE
Payer: COMMERCIAL

## 2020-10-25 ENCOUNTER — APPOINTMENT (OUTPATIENT)
Dept: CT IMAGING | Age: 60
End: 2020-10-25
Payer: COMMERCIAL

## 2020-10-25 ENCOUNTER — APPOINTMENT (OUTPATIENT)
Dept: GENERAL RADIOLOGY | Age: 60
End: 2020-10-25
Payer: COMMERCIAL

## 2020-10-25 VITALS
DIASTOLIC BLOOD PRESSURE: 74 MMHG | WEIGHT: 293 LBS | BODY MASS INDEX: 61.08 KG/M2 | SYSTOLIC BLOOD PRESSURE: 125 MMHG | OXYGEN SATURATION: 94 % | TEMPERATURE: 98.8 F | RESPIRATION RATE: 24 BRPM | HEART RATE: 91 BPM

## 2020-10-25 LAB
-: ABNORMAL
ABSOLUTE BANDS #: 3.74 K/UL (ref 0–1)
ABSOLUTE EOS #: ABNORMAL K/UL (ref 0–0.4)
ABSOLUTE IMMATURE GRANULOCYTE: ABNORMAL K/UL (ref 0–0.3)
ABSOLUTE LYMPH #: 1.74 K/UL (ref 1–4.8)
ABSOLUTE MONO #: 0.75 K/UL (ref 0–1)
ALBUMIN SERPL-MCNC: 4.2 G/DL (ref 3.5–5.2)
ALBUMIN/GLOBULIN RATIO: ABNORMAL (ref 1–2.5)
ALP BLD-CCNC: 134 U/L (ref 35–104)
ALT SERPL-CCNC: 25 U/L (ref 5–33)
AMORPHOUS: ABNORMAL
ANION GAP SERPL CALCULATED.3IONS-SCNC: 11 MMOL/L (ref 9–17)
AST SERPL-CCNC: 21 U/L
BACTERIA: ABNORMAL
BANDS: 15 % (ref 0–10)
BASOPHILS # BLD: ABNORMAL % (ref 0–2)
BASOPHILS ABSOLUTE: ABNORMAL K/UL (ref 0–0.2)
BILIRUB SERPL-MCNC: 0.71 MG/DL (ref 0.3–1.2)
BILIRUBIN URINE: NEGATIVE
BUN BLDV-MCNC: 19 MG/DL (ref 8–23)
BUN/CREAT BLD: 13 (ref 9–20)
CALCIUM SERPL-MCNC: 9.6 MG/DL (ref 8.6–10.4)
CASTS UA: ABNORMAL /LPF
CHLORIDE BLD-SCNC: 99 MMOL/L (ref 98–107)
CHP ED QC CHECK: YES
CO2: 25 MMOL/L (ref 20–31)
COLOR: ABNORMAL
COMMENT UA: ABNORMAL
CREAT SERPL-MCNC: 1.41 MG/DL (ref 0.5–0.9)
CRYSTALS, UA: ABNORMAL /HPF
DIFFERENTIAL TYPE: ABNORMAL
EOSINOPHILS RELATIVE PERCENT: ABNORMAL % (ref 0–5)
EPITHELIAL CELLS UA: ABNORMAL /HPF
GFR AFRICAN AMERICAN: 46 ML/MIN
GFR NON-AFRICAN AMERICAN: 38 ML/MIN
GFR SERPL CREATININE-BSD FRML MDRD: ABNORMAL ML/MIN/{1.73_M2}
GFR SERPL CREATININE-BSD FRML MDRD: ABNORMAL ML/MIN/{1.73_M2}
GLUCOSE BLD-MCNC: 130 MG/DL
GLUCOSE BLD-MCNC: 130 MG/DL (ref 65–99)
GLUCOSE BLD-MCNC: 190 MG/DL (ref 70–99)
GLUCOSE URINE: NEGATIVE
HCT VFR BLD CALC: 40.4 % (ref 36–46)
HEMOGLOBIN: 13 G/DL (ref 12–16)
IMMATURE GRANULOCYTES: ABNORMAL %
INR BLD: 1.3
KETONES, URINE: NEGATIVE
LEUKOCYTE ESTERASE, URINE: ABNORMAL
LYMPHOCYTES # BLD: 7 % (ref 15–40)
MCH RBC QN AUTO: 26.5 PG (ref 26–34)
MCHC RBC AUTO-ENTMCNC: 32.3 G/DL (ref 31–37)
MCV RBC AUTO: 81.9 FL (ref 80–100)
MONOCYTES # BLD: 3 % (ref 4–8)
MORPHOLOGY: ABNORMAL
MUCUS: ABNORMAL
NITRITE, URINE: POSITIVE
NRBC AUTOMATED: ABNORMAL PER 100 WBC
OTHER OBSERVATIONS UA: ABNORMAL
PDW BLD-RTO: 17.6 % (ref 12.1–15.2)
PH UA: 6 (ref 5–8)
PLATELET # BLD: 275 K/UL (ref 140–450)
PLATELET ESTIMATE: ABNORMAL
PMV BLD AUTO: ABNORMAL FL (ref 6–12)
POTASSIUM SERPL-SCNC: 4.7 MMOL/L (ref 3.7–5.3)
PROTEIN UA: ABNORMAL
PROTHROMBIN TIME: 15.9 SEC (ref 11.5–14.2)
RBC # BLD: 4.93 M/UL (ref 4–5.2)
RBC # BLD: ABNORMAL 10*6/UL
RBC UA: ABNORMAL /HPF (ref 0–2)
RENAL EPITHELIAL, UA: ABNORMAL /HPF
SARS-COV-2, RAPID: NOT DETECTED
SARS-COV-2: NORMAL
SARS-COV-2: NORMAL
SEG NEUTROPHILS: 75 % (ref 47–75)
SEGMENTED NEUTROPHILS ABSOLUTE COUNT: 18.67 K/UL (ref 2.5–7)
SODIUM BLD-SCNC: 135 MMOL/L (ref 135–144)
SOURCE: NORMAL
SPECIFIC GRAVITY UA: 1.02 (ref 1–1.03)
TOTAL PROTEIN: 7.7 G/DL (ref 6.4–8.3)
TRICHOMONAS: ABNORMAL
TROPONIN INTERP: NORMAL
TROPONIN T: <0.03 NG/ML
TROPONIN, HIGH SENSITIVITY: NORMAL NG/L (ref 0–14)
TURBIDITY: ABNORMAL
URINE HGB: ABNORMAL
UROBILINOGEN, URINE: NORMAL
WBC # BLD: 24.9 K/UL (ref 3.5–11)
WBC # BLD: ABNORMAL 10*3/UL
WBC UA: ABNORMAL /HPF
YEAST: ABNORMAL

## 2020-10-25 PROCEDURE — 81001 URINALYSIS AUTO W/SCOPE: CPT

## 2020-10-25 PROCEDURE — 85025 COMPLETE CBC W/AUTO DIFF WBC: CPT

## 2020-10-25 PROCEDURE — 86403 PARTICLE AGGLUT ANTBDY SCRN: CPT

## 2020-10-25 PROCEDURE — 70450 CT HEAD/BRAIN W/O DYE: CPT

## 2020-10-25 PROCEDURE — 2580000003 HC RX 258: Performed by: FAMILY MEDICINE

## 2020-10-25 PROCEDURE — 87186 SC STD MICRODIL/AGAR DIL: CPT

## 2020-10-25 PROCEDURE — 87086 URINE CULTURE/COLONY COUNT: CPT

## 2020-10-25 PROCEDURE — 96374 THER/PROPH/DIAG INJ IV PUSH: CPT

## 2020-10-25 PROCEDURE — U0002 COVID-19 LAB TEST NON-CDC: HCPCS

## 2020-10-25 PROCEDURE — 99283 EMERGENCY DEPT VISIT LOW MDM: CPT

## 2020-10-25 PROCEDURE — 87077 CULTURE AEROBIC IDENTIFY: CPT

## 2020-10-25 PROCEDURE — 6360000002 HC RX W HCPCS: Performed by: FAMILY MEDICINE

## 2020-10-25 PROCEDURE — 93005 ELECTROCARDIOGRAM TRACING: CPT | Performed by: FAMILY MEDICINE

## 2020-10-25 PROCEDURE — 84484 ASSAY OF TROPONIN QUANT: CPT

## 2020-10-25 PROCEDURE — 80053 COMPREHEN METABOLIC PANEL: CPT

## 2020-10-25 PROCEDURE — 82947 ASSAY GLUCOSE BLOOD QUANT: CPT

## 2020-10-25 PROCEDURE — 85610 PROTHROMBIN TIME: CPT

## 2020-10-25 PROCEDURE — 71045 X-RAY EXAM CHEST 1 VIEW: CPT

## 2020-10-25 RX ADMIN — CEFTRIAXONE SODIUM 1 G: 1 INJECTION, POWDER, FOR SOLUTION INTRAMUSCULAR; INTRAVENOUS at 16:00

## 2020-10-25 ASSESSMENT — ENCOUNTER SYMPTOMS
COUGH: 0
SHORTNESS OF BREATH: 0

## 2020-10-25 NOTE — ED PROVIDER NOTES
[facility]  eMERGENCY dEPARTMENT eNCOUnter          [facility]       Chief Complaint   Patient presents with    Dizziness     pt states she is lightheaded this morning, states balance is off        Nurses Notes reviewed and I agree except as noted in the HPI. HISTORY OF PRESENT ILLNESS    Yolanda Terrazas is a 61 y.o. female who presents to the emergency room via private vehicle with , patient states awoke at 0830 hours this morning with a sensation of dizziness and lightheadedness, difficulty try to get out of bed, denies any spinning of the room or vertigo sensation otherwise, patient stating \"something was not right\". .  Patient states she felt she was having slurred speech initially, and states her mother felt the same the patient's  did not appreciate this. Patient denies any similar prior, denies any trauma falls or head strikes denies any new or change in medication denies any recent illness, patient denies any respiratory or urinary symptoms denies any wounds on her body. Denies any new/acute pain, has chronic pain in her shoulders right greater than left. Arabella Moore REVIEW OF SYSTEMS     Review of Systems   Constitutional: Negative for fever. Eyes: Negative for visual disturbance. Respiratory: Negative for cough and shortness of breath. Cardiovascular: Negative for chest pain and palpitations. Genitourinary: Negative for dysuria. Skin: Negative for wound. Neurological: Positive for dizziness and light-headedness. Negative for syncope and headaches. All other systems reviewed and are negative. PAST MEDICAL HISTORY    has a past medical history of Acid reflux, Bleeding tendency ([address] Blood type O+, Bronchitis, Chronic back pain, Depression, Diverticulitis, DVT (deep venous thrombosis) (HCC), Fibromyalgia, Hypertension, Internal hemorrhoid, Left thyroid nodule, Nausea & vomiting, and Pulmonary embolism ([address]     SURGICAL HISTORY      has a past surgical history that includes Colonoscopy; Total knee arthroplasty (Right); Total knee arthroplasty (Left); and Cholecystectomy (2013). CURRENT MEDICATIONS       Discharge Medication List as of 10/25/2020  4:46 PM      CONTINUE these medications which have NOT CHANGED    Details   spironolactone (ALDACTONE) 25 MG tablet Take 1 tablet by mouth 2 times daily, Disp-60 tablet,R-3Normal      amitriptyline (ELAVIL) 100 MG tablet Take 1 tablet by mouth nightly, Disp-90 tablet,R-1Normal      celecoxib (CELEBREX) 100 MG capsule Take 1 capsule by mouth 2 times daily, Disp-180 capsule,R-1Normal      furosemide (LASIX) 20 MG tablet Take 1 tablet by mouth daily, Disp-30 tablet,R-11Normal      omeprazole (PRILOSEC) 20 MG delayed release capsule Take 1 capsule by mouth daily, Disp-90 capsule,R-1Normal      dilTIAZem (CARDIZEM CD) 240 MG extended release capsule Take 1 capsule by mouth daily, Disp-90 capsule,R-3Normal      flecainide (TAMBOCOR) 50 MG tablet Take 1 tablet by mouth 2 times daily, Disp-60 tablet,R-11Normal      metoprolol succinate (TOPROL XL) 50 MG extended release tablet Take 1 tablet by mouth daily, Disp-90 tablet,R-3Normal      gabapentin (NEURONTIN) 400 MG capsule Take 3 capsules by mouth 2 times daily for 180 days. , Disp-540 capsule, R-1Normal      apixaban (ELIQUIS) 5 MG TABS tablet Take 1 tablet by mouth 2 times daily, Disp-180 tablet, R-1Normal      Elastic Bandages & Supports (MEDICAL COMPRESSION STOCKINGS) MISC DAILY Starting Thu 1/3/2019, Disp-1 each, R-0, PrintKnee high 20mmHg             ALLERGIES     is allergic to ampicillin and sulfa antibiotics. FAMILY HISTORY     She indicated that her mother is alive. She indicated that her father is alive. She indicated that her sister is alive. She indicated that both of her brothers are alive. She indicated that her maternal grandmother is . She indicated that her maternal grandfather is .  She indicated that her paternal grandmother is . She indicated that her paternal grandfather is . family history includes Atrial Fibrillation in her brother and mother; Diabetes in her sister; Stroke in her father; Thyroid Disease in her brother. SOCIAL HISTORY      reports that she has never smoked. She has never used smokeless tobacco. She reports that she does not drink alcohol or use drugs. PHYSICAL EXAM     INITIAL VITALS:  weight is 390 lb (176.9 kg) (abnormal). Her temperature is 98.8 °F (37.1 °C). Her blood pressure is 125/74 and her pulse is 91. Her respiration is 24 and oxygen saturation is 94%. Physical Exam   Constitutional: Patient is oriented to person, place, and time. Patient appears well-developed and well-nourished. Patient is active and cooperative. HENT:   Head: Normocephalic and atraumatic. Head is without contusion. Right Ear: Hearing and external ear normal. No drainage. Left Ear: Hearing and external ear normal. No drainage. Nose: Nose normal. No nasal deformity. No epistaxis. Mouth/Throat: Mucous membranes are not dry. Eyes: EOMI. Conjunctivae, sclera, and lids are normal. Right eye exhibits no discharge. Left eye exhibits no discharge. Neck: Full passive range of motion without pain and phonation normal.   Cardiovascular:  Normal rate, regular rhythm and intact distal pulses. Bilateral lower extremity edema with markings from compression of socks noted  Pulses: Right radial pulse  2+   Pulmonary/Chest: Effort normal. No tachypnea and no bradypnea. No wheezes, rhonchi, or rales. Abdominal: BMI 61, soft, nontender  Musculoskeletal:   Negative acute trauma or deformity,  apparent full range of motion and normal strength all extremities appropriate to age. Neurological: Patient is alert and oriented to person, place, and time. patient displays no tremor. Patient displays no seizure activity. NIHSS = 0, CN II-XII grossly intact. Skin: Skin is warm and dry. Patient is not diaphoretic. Bilateral stasis dermatitis of anterior shins noted  Psychiatric: Patient has a normal mood and affect. Patient speech is normal and behavior is normal. Cognition and memory are normal.    DIFFERENTIAL DIAGNOSIS:   CVA/TIA, ICH, GTA, infection NOS    DIAGNOSTIC RESULTS     EKG: All EKG's are interpreted by the Emergency Department Physician who either signs or Co-signs this chart in the absence of a cardiologist.  EKG    The patient had an EKG which is interpreted by me in the absence of a Cardiologist.   [] Without comparison to previous. [x] With comparison to a previous EKG Dated 8/27/2020    EKG @ 1224 hrs -sinus rhythm rate 84 normal axis normal intervals, as compared to prior EKG no significant changes morphology      RADIOLOGY: non-plain film images(s) such as CT, Ultrasound and MRI are read by the radiologist.  XR CHEST PORTABLE   Final Result      Minor perihilar and lower lung bronchial wall thickening which may indicate    mild viral versus reactive airways disease. No other potentially significant    acute findings. CT HEAD WO CONTRAST   Final Result      1. No intracranial hemorrhage. 2. No acute findings on brain CT. Results telephoned to Dr. Jammie Matias following    stroke protocol at 1300 hours.              LABS:   Labs Reviewed   CBC WITH AUTO DIFFERENTIAL - Abnormal; Notable for the following components:       Result Value    WBC 24.9 (*)     RDW 17.6 (*)     Lymphocytes 7 (*)     Monocytes 3 (*)     Bands 15 (*)     Segs Absolute 18.67 (*)     Absolute Bands # 3.74 (*)     All other components within normal limits   COMPREHENSIVE METABOLIC PANEL W/ REFLEX TO MG FOR LOW K - Abnormal; Notable for the following components:    Glucose 190 (*)     CREATININE 1.41 (*)     Alkaline Phosphatase 134 (*)     GFR Non- 38 (*)     GFR  46 (*)     All other components within normal limits   PROTIME-INR - Abnormal; Notable for the following components:    Protime 15.9 (*)     All other components within normal limits   GLUCOSE, WHOLE BLOOD - Abnormal; Notable for the following components:    POC Glucose 130 (*)     All other components within normal limits   URINALYSIS - Abnormal; Notable for the following components:    Color, UA SEAN (*)     Turbidity UA HAZY (*)     Urine Hgb TRACE (*)     Protein, UA 1+ (*)     Nitrite, Urine POSITIVE (*)     Leukocyte Esterase, Urine 2+ (*)     All other components within normal limits   MICROSCOPIC URINALYSIS - Abnormal; Notable for the following components:    Bacteria, UA 3+ (*)     All other components within normal limits   POCT GLUCOSE - Normal   CULTURE, URINE   TROPONIN   COVID-19       EMERGENCY DEPARTMENT COURSE:   Vitals:    Vitals:    10/25/20 1502 10/25/20 1526 10/25/20 1604 10/25/20 1621   BP:    125/74   Pulse: 90 89 89 91   Resp: 23 19 26 24   Temp:       SpO2: 95% 98% 96% 94%   Weight:         Patient history and physical exam taken at bedside, discussed patient symptoms and exam findings, will get CT head noncontrast under stroke protocol, EKG, blood work, IV access.   Patient sitting in bed semi-Fowlers, has been present, acknowledges    NIHSS = 0    EKG as above    Received phone call from Dr. Frank Mcduffie, allergy, regarding patient CT head noncontrast, no acute findings    Updated patient and patient's  on EKG and CT findings, waiting lab work-up, acknowledged    Initial labs reviewed, noting WBC 24.9 with 15% bands    Chest x-ray and urinalysis ordered    Remainder labs reviewed, noting serum creatinine 1.41 with normal electrolytes, normal liver function, normal troponin, INR 1.3    Again discussed with patient her lab findings, noting elevated white blood cell count and bands, explained this often means infection in the body is getting ready to fight something, again discussed with patient any symptoms, patient not having any respiratory or urinary symptoms, no wounds on the body, no diarrhea or other changes 4:46 PM              Summation      Patient Course:  trn    ED Medications administered this visit:    Medications   cefTRIAXone (ROCEPHIN) 1 g in sterile water 10 mL IV syringe (0 g Intravenous Stopped 10/25/20 1164)       New Prescriptions from this visit:    Discharge Medication List as of 10/25/2020  4:46 PM          Follow-up:  No follow-up provider specified. Final Impression:   1. Dizziness    2. Bandemia    3.  Slurring of speech    4. Acute cystitis with hematuria               (Please note that portions of this note were completed with a voice recognition program.  Efforts were made to edit the dictations but occasionally words are mis-transcribed.)    MD Alcides Walsh MD  10/26/20 7228

## 2020-10-26 LAB
EKG ATRIAL RATE: 84 BPM
EKG P AXIS: 50 DEGREES
EKG P-R INTERVAL: 196 MS
EKG Q-T INTERVAL: 324 MS
EKG QRS DURATION: 74 MS
EKG QTC CALCULATION (BAZETT): 382 MS
EKG R AXIS: 48 DEGREES
EKG T AXIS: 58 DEGREES
EKG VENTRICULAR RATE: 84 BPM

## 2020-10-26 PROCEDURE — 93010 ELECTROCARDIOGRAM REPORT: CPT | Performed by: INTERNAL MEDICINE

## 2020-10-27 ENCOUNTER — TELEPHONE (OUTPATIENT)
Dept: FAMILY MEDICINE CLINIC | Age: 60
End: 2020-10-27

## 2020-10-27 LAB
AVERAGE GLUCOSE: 128
CULTURE: ABNORMAL
CULTURE: ABNORMAL
HBA1C MFR BLD: 6.1 %
Lab: ABNORMAL
SPECIMEN DESCRIPTION: ABNORMAL

## 2020-10-28 NOTE — TELEPHONE ENCOUNTER
[facility] Transitions Initial Follow Up Call    Outreach made within 2 business days of discharge: Yes    Patient: Bipin Nieves Patient : 1960   MRN: [id number]  Reason for Admission: Acute lower UTI, Acute encephalopathy secondary to UTI  Discharge Date: 10/25/20       Spoke with: Patient    Discharge department/facility: [facility] Interactive Patient Contact:  Was patient able to fill all prescriptions: N/A  Was patient instructed to bring all medications to the follow-up visit: Yes  Is patient taking all medications as directed in the discharge summary? Yes  Does patient understand their discharge instructions: N/A  Does patient have questions or concerns that need addressed prior to 7-14 day follow up office visit: no    Scheduled appointment with PCP within 7-14 days    Follow Up  Future Appointments   Date Time Provider Kya Trimble   11/3/2020 10:00 AM ASHLEY Gomez - MARIAN Garcia Sol CURTIS [facility]   2020 12:00 PM MD Larissa Ricardo [facility]     After going thru questions with patient, she informs me she has not been discharged yet. Echo was being done today and possible discharge after that is done.    Mabel Orellana LPN
MD Jose Delgado [facility]            Patient Active Problem List:     Fibromyalgia     HTN (hypertension)     Follicular adenoma of thyroid gland     S/P laparoscopic cholecystectomy     Right hip pain     Bilateral leg edema     Chronic fatigue     Left thyroid nodule     Chronic back pain greater than 3 months duration     Shortness of breath     Pulmonary hypertension associated with unclear multi-factorial mechanisms ([address]     Morbid obesity due to excess calories (HCC)     MAYURI (obstructive sleep apnea)     Essential hypertension     Coxitis     History of total hip replacement     Gastroesophageal reflux disease without esophagitis     Polyarthritis

## 2020-11-03 ENCOUNTER — TELEPHONE (OUTPATIENT)
Dept: FAMILY MEDICINE CLINIC | Age: 60
End: 2020-11-03

## 2020-11-03 ENCOUNTER — HOSPITAL ENCOUNTER (OUTPATIENT)
Age: 60
Discharge: HOME | End: 2020-11-03
Payer: COMMERCIAL

## 2020-11-03 ENCOUNTER — OFFICE VISIT (OUTPATIENT)
Dept: FAMILY MEDICINE CLINIC | Age: 60
End: 2020-11-03
Payer: COMMERCIAL

## 2020-11-03 VITALS
DIASTOLIC BLOOD PRESSURE: 70 MMHG | SYSTOLIC BLOOD PRESSURE: 128 MMHG | OXYGEN SATURATION: 96 % | TEMPERATURE: 98.1 F | HEART RATE: 68 BPM

## 2020-11-03 LAB
ABSOLUTE EOS #: 0 K/UL (ref 0–0.4)
ABSOLUTE IMMATURE GRANULOCYTE: ABNORMAL K/UL (ref 0–0.3)
ABSOLUTE LYMPH #: 1.5 K/UL (ref 1–4.8)
ABSOLUTE MONO #: 0.5 K/UL (ref 0–1)
ALBUMIN SERPL-MCNC: 4.1 G/DL (ref 3.5–5.2)
ALBUMIN/GLOBULIN RATIO: ABNORMAL (ref 1–2.5)
ALP BLD-CCNC: 120 U/L (ref 35–104)
ALT SERPL-CCNC: 38 U/L (ref 5–33)
ANION GAP SERPL CALCULATED.3IONS-SCNC: 10 MMOL/L (ref 9–17)
AST SERPL-CCNC: 30 U/L
BASOPHILS # BLD: 1 % (ref 0–2)
BASOPHILS ABSOLUTE: 0.1 K/UL (ref 0–0.2)
BILIRUB SERPL-MCNC: 0.39 MG/DL (ref 0.3–1.2)
BUN BLDV-MCNC: 14 MG/DL (ref 8–23)
BUN/CREAT BLD: 11 (ref 9–20)
CALCIUM SERPL-MCNC: 9.5 MG/DL (ref 8.6–10.4)
CHLORIDE BLD-SCNC: 99 MMOL/L (ref 98–107)
CO2: 27 MMOL/L (ref 20–31)
CREAT SERPL-MCNC: 1.27 MG/DL (ref 0.5–0.9)
DIFFERENTIAL TYPE: YES
EOSINOPHILS RELATIVE PERCENT: 0 % (ref 0–5)
GFR AFRICAN AMERICAN: 52 ML/MIN
GFR NON-AFRICAN AMERICAN: 43 ML/MIN
GFR SERPL CREATININE-BSD FRML MDRD: ABNORMAL ML/MIN/{1.73_M2}
GFR SERPL CREATININE-BSD FRML MDRD: ABNORMAL ML/MIN/{1.73_M2}
GLUCOSE BLD-MCNC: 123 MG/DL (ref 70–99)
HCT VFR BLD CALC: 39.6 % (ref 36–46)
HEMOGLOBIN: 12.8 G/DL (ref 12–16)
IMMATURE GRANULOCYTES: ABNORMAL %
LYMPHOCYTES # BLD: 21 % (ref 15–40)
MCH RBC QN AUTO: 26.5 PG (ref 26–34)
MCHC RBC AUTO-ENTMCNC: 32.3 G/DL (ref 31–37)
MCV RBC AUTO: 81.8 FL (ref 80–100)
MONOCYTES # BLD: 7 % (ref 4–8)
NRBC AUTOMATED: ABNORMAL PER 100 WBC
PDW BLD-RTO: 17.8 % (ref 12.1–15.2)
PLATELET # BLD: 370 K/UL (ref 140–450)
PLATELET ESTIMATE: ABNORMAL
PMV BLD AUTO: ABNORMAL FL (ref 6–12)
POTASSIUM SERPL-SCNC: 4.4 MMOL/L (ref 3.7–5.3)
RBC # BLD: 4.84 M/UL (ref 4–5.2)
RBC # BLD: ABNORMAL 10*6/UL
SEG NEUTROPHILS: 71 % (ref 47–75)
SEGMENTED NEUTROPHILS ABSOLUTE COUNT: 5.1 K/UL (ref 2.5–7)
SODIUM BLD-SCNC: 136 MMOL/L (ref 135–144)
TOTAL PROTEIN: 7.3 G/DL (ref 6.4–8.3)
WBC # BLD: 7.1 K/UL (ref 3.5–11)
WBC # BLD: ABNORMAL 10*3/UL

## 2020-11-03 PROCEDURE — 1111F DSCHRG MED/CURRENT MED MERGE: CPT | Performed by: NURSE PRACTITIONER

## 2020-11-03 PROCEDURE — 36415 COLL VENOUS BLD VENIPUNCTURE: CPT

## 2020-11-03 PROCEDURE — 85025 COMPLETE CBC W/AUTO DIFF WBC: CPT

## 2020-11-03 PROCEDURE — 99496 TRANSJ CARE MGMT HIGH F2F 7D: CPT | Performed by: NURSE PRACTITIONER

## 2020-11-03 PROCEDURE — 80053 COMPREHEN METABOLIC PANEL: CPT

## 2020-11-03 RX ORDER — GABAPENTIN 400 MG/1
400 CAPSULE ORAL 2 TIMES DAILY
COMMUNITY
Start: 2020-10-27 | End: 2020-11-03 | Stop reason: ALTCHOICE

## 2020-11-03 RX ORDER — PRAMIPEXOLE DIHYDROCHLORIDE 0.25 MG/1
0.25 TABLET ORAL NIGHTLY
Qty: 30 TABLET | Refills: 2 | Status: SHIPPED | OUTPATIENT
Start: 2020-11-03 | End: 2021-01-21 | Stop reason: ALTCHOICE

## 2020-11-03 RX ORDER — CHLORTHALIDONE 25 MG/1
25 TABLET ORAL DAILY
Status: ON HOLD | COMMUNITY
End: 2021-02-06 | Stop reason: ALTCHOICE

## 2020-11-03 NOTE — PATIENT INSTRUCTIONS
SURVEY:    You may be receiving a survey from [employer] regarding your visit today. Please complete the survey to enable us to provide the highest quality of care to you and your family. If you cannot score us a very good (5 Stars) on any question, please call the office to discuss how we could have made your experience a very good one. Thank you.     Clinical Care Team: ASHLEY Chaidez-MARIAN Rosa LPN    Clerical Team: Myriam Sandoval

## 2020-11-03 NOTE — PROGRESS NOTES
Post-Discharge Transitional Care Management Services or Hospital Follow Up      [address]   YOB: 1960    Date of Office Visit:  11/3/2020  Date of Hospital Admission: 10/25/20  Date of Hospital Discharge: 10/29/2020  Risk of hospital readmission (high >=14%.  Medium >=10%) :No data recorded    Care management risk score Rising risk (score 2-5) and Complex Care (Scores >=6): 4     Non face to face  following discharge, date last encounter closed (first attempt may have been earlier): 10/28/2020 10:53 AM    Call initiated 2 business days of discharge: Yes    Patient Active Problem List   Diagnosis    Fibromyalgia    HTN (hypertension)    Follicular adenoma of thyroid gland    S/P laparoscopic cholecystectomy    Right hip pain    Bilateral leg edema    Chronic fatigue    Left thyroid nodule    Chronic back pain greater than 3 months duration    Shortness of breath    Pulmonary hypertension associated with unclear multi-factorial mechanisms ([address]    Morbid obesity due to excess calories ([address]    MAYURI (obstructive sleep apnea)    Essential hypertension    Coxitis    History of total hip replacement    Gastroesophageal reflux disease without esophagitis    Polyarthritis       Allergies   Allergen Reactions    Ampicillin Rash    Sulfa Antibiotics Rash       Medications listed as ordered at the time of discharge from hospital   Nallely Batch, [address] Medication Instructions PRADIP:    Printed on:11/03/20 1301   Medication Information                      amitriptyline (ELAVIL) 100 MG tablet  Take 1 tablet by mouth nightly             apixaban (ELIQUIS) 5 MG TABS tablet  Take 1 tablet by mouth 2 times daily             celecoxib (CELEBREX) 100 MG capsule  Take 1 capsule by mouth 2 times daily             chlorthalidone (HYGROTON) 25 MG tablet  Take 25 mg by mouth daily             dilTIAZem (CARDIZEM CD) 240 MG extended release capsule  Take 1 capsule by mouth daily             Elastic Bandages & Supports (MEDICAL COMPRESSION STOCKINGS) MISC  1 each by Does not apply route daily Knee high 20mmHg             flecainide (TAMBOCOR) 50 MG tablet  Take 1 tablet by mouth 2 times daily             furosemide (LASIX) 20 MG tablet  Take 1 tablet by mouth daily             metoprolol succinate (TOPROL XL) 50 MG extended release tablet  Take 1 tablet by mouth daily             mirabegron (MYRBETRIQ) 25 MG TB24  Take 1 tablet by mouth daily             omeprazole (PRILOSEC) 20 MG delayed release capsule  Take 1 capsule by mouth daily             pramipexole (MIRAPEX) 0.25 MG tablet  Take 1 tablet by mouth nightly                   Medications marked \"taking\" at this time  Outpatient Medications Marked as Taking for the 11/3/20 encounter (Office Visit) with ASHLEY Ag - CNP   Medication Sig Dispense Refill    chlorthalidone (HYGROTON) 25 MG tablet Take 25 mg by mouth daily      pramipexole (MIRAPEX) 0.25 MG tablet Take 1 tablet by mouth nightly 30 tablet 2    mirabegron (MYRBETRIQ) 25 MG TB24 Take 1 tablet by mouth daily 30 tablet 2    amitriptyline (ELAVIL) 100 MG tablet Take 1 tablet by mouth nightly 90 tablet 1    celecoxib (CELEBREX) 100 MG capsule Take 1 capsule by mouth 2 times daily 180 capsule 1    furosemide (LASIX) 20 MG tablet Take 1 tablet by mouth daily 30 tablet 11    omeprazole (PRILOSEC) 20 MG delayed release capsule Take 1 capsule by mouth daily 90 capsule 1    dilTIAZem (CARDIZEM CD) 240 MG extended release capsule Take 1 capsule by mouth daily 90 capsule 3    flecainide (TAMBOCOR) 50 MG tablet Take 1 tablet by mouth 2 times daily 60 tablet 11    metoprolol succinate (TOPROL XL) 50 MG extended release tablet Take 1 tablet by mouth daily 90 tablet 3    apixaban (ELIQUIS) 5 MG TABS tablet Take 1 tablet by mouth 2 times daily 180 tablet 1        Medications patient taking as of now reconciled against medications ordered at time of hospital discharge: Yes    Chief Complaint   Patient presents with    Follow-Up from Hospital     Patient here today for hopsital follow up. Was admitted to [facility] on 10/25/20 from [facility] ED . Discharged on 10/29/20, Dx with acute UTI, acute encephalopathy secondary to UTI. She said she is feeling better but feels weak.  Incontinence     Patient complains of urinary incontinence. This has been happening for over 1 year.  Leg Pain     Would like to discuss restless legs, she would like to go off gabapentin if she can. History of Present illness - Follow up of Hospital diagnosis(es): pt is a 60 yo female who reports for hospital follow up. On 10/25/2020, pt was at home. She woke up and was very weak, dizzy, and had slurred speech. Patient's  went to [facility]. When he returned she was still feeling poorly so he took her to the ER. Patient was diagnosed with acute cystitis with hematuria, acute kidney injury. Patient was transferred to [facility]. There she received IV antibiotics, IV fluids, and eventually p.o. antibiotics. Patient was discharged and sent home on oral antibiotics. Patient states she took her last antibiotic yesterday. Today she relates that she is feeling much improved. Denies any symptoms of fever or chills. Denies any burning with urination. Patient states that she has had incontinence for the past 2 years. Over the last few months it has gotten increasingly worse. Patient does have incontinence when she sneezes or coughs, however also has very strong urge to urinate and sometimes cannot hold her urine. Inpatient course: Discharge summary reviewed- see chart. Interval history/Current status:    A comprehensive review of systems was negative except for what was noted in the HPI. Vitals:    11/03/20 1010   BP: 128/70   Pulse: 68   Temp: 98.1 °F (36.7 °C)   TempSrc: Oral   SpO2: 96%     There is no height or weight on file to calculate BMI.    Wt Readings from Last 3 Encounters: 10/25/20 (!) 390 lb (176.9 kg)   06/11/20 (!) 390 lb (176.9 kg)   06/11/20 (!) 404 lb (183.3 kg)     BP Readings from Last 3 Encounters:   11/03/20 128/70   10/25/20 125/74   09/24/20 (!) 145/77        Physical Exam:  General Appearance: alert and oriented to person, place and time, well-developed and well-nourished, in no acute distress  Pulmonary/Chest: clear to auscultation bilaterally- no wheezes, rales or rhonchi, normal air movement, no respiratory distress  Cardiovascular: normal rate, normal S1 and S2, no gallops, intact distal pulses and no carotid bruits  Abdomen: soft, non-tender, non-distended, normal bowel sounds, no masses or organomegaly  Extremities: no cyanosis, no clubbing and bilat lower leg edema and tenderness. Assessment/Plan:  1. Acute cystitis with hematuria    ---continue with at least 64oz water daily    - CBC Auto Differential; Future  - Comprehensive Metabolic Panel; Future  - [place] DISCHARGE MEDS RECONCILED W/ CURRENT OUTPATIENT MED LIST  - Jeanette Khan MD, Urology, [facility]    2. [facility] (acute kidney injury) ([facility] [address]    ----will recheck  CMP. Pt advised to avoid NSAIDs    - CBC Auto Differential; Future  - Comprehensive Metabolic Panel; Future  - [place] DISCHARGE MEDS RECONCILED W/ CURRENT OUTPATIENT MED LIST  - Jeanette Khan MD, Urology, [facility]    3. Mixed incontinence    ---will start on myrbetric. Pt educated about medication. Will send to urology for evaluation  - Micah Palomares MD, Urology, [facility]    4.  Follow-up exam          Medical Decision Making: high complexity

## 2020-11-03 NOTE — TELEPHONE ENCOUNTER
Patient notified of lab results and recommendations.    She said she does not know if she can give up her celebrex, she will try  CMP ordered for 1 month

## 2020-11-03 NOTE — TELEPHONE ENCOUNTER
----- Message from ASHLEY Sagastume CNP sent at 11/3/2020  1:26 PM EST -----  Please let patient know that her kidney function is still decreased, however better than it was. Continue to avoid NSAIDs. I would like to redraw CMP in 1 month.

## 2020-11-06 RX ORDER — OXYBUTYNIN CHLORIDE 10 MG/1
10 TABLET, EXTENDED RELEASE ORAL DAILY
Qty: 30 TABLET | Refills: 3 | Status: SHIPPED | OUTPATIENT
Start: 2020-11-06 | End: 2021-01-21 | Stop reason: ALTCHOICE

## 2020-11-06 NOTE — TELEPHONE ENCOUNTER
Marifer said she is having a hard time getting her latest prescription filled. She believes she needs a prior auth but isn't sure of the name of the medication. Can we please look into this and let Marifer know.     Health Maintenance   Topic Date Due    DTaP/Tdap/Td vaccine (1 - Tdap) 04/15/1979    Shingles Vaccine (1 of 2) 04/15/2010    Cervical cancer screen  09/15/2017    Breast cancer screen  05/10/2018    A1C test (Diabetic or Prediabetic)  06/17/2020    Potassium monitoring  11/03/2021    Creatinine monitoring  11/03/2021    Colon cancer screen colonoscopy  04/05/2022    Lipid screen  08/28/2025    Flu vaccine  Completed    Hepatitis C screen  Addressed    HIV screen  Addressed    Hepatitis A vaccine  Aged Out    Hepatitis B vaccine  Aged Out    Hib vaccine  Aged Out    Meningococcal (ACWY) vaccine  Aged Out    Pneumococcal 0-64 years Vaccine  Aged Out             (applicable per patient's age: Cancer Screenings, Depression Screening, Fall Risk Screening, Immunizations)    Hemoglobin A1C (%)   Date Value   06/17/2019 6.1   01/04/2016 5.4     LDL Cholesterol (mg/dL)   Date Value   08/28/2020 102     LDL Calculated (mg/dL)   Date Value   05/23/2019 131     AST (U/L)   Date Value   11/03/2020 30     ALT (U/L)   Date Value   11/03/2020 38 (H)     BUN (mg/dL)   Date Value   11/03/2020 14      (goal A1C is < 7)   (goal LDL is <100) need 30-50% reduction from baseline     BP Readings from Last 3 Encounters:   11/03/20 128/70   10/25/20 125/74   09/24/20 (!) 145/77    (goal /80)      All Future Testing planned in [facility]:  Lab Frequency Next Occurrence   Basic Metabolic Panel Once 47/45/2963   TSH with Reflex Once 12/24/2020   CBC Auto Differential Once 12/24/2020   Comprehensive Metabolic Panel Once 23/50/7012   Vitamin D 25 Hydroxy Once 12/24/2020   Lipid Panel Once 12/24/2020   Magnesium Once 12/24/2020   Comprehensive Metabolic Panel Once 85/77/3427       Next Visit Date:  Future Appointments Date Time Provider Kya Trimble   12/10/2020  1:45 PM MD erika Reardon urol [facility]   12/17/2020 12:00 PM MD Nemo Tellezix [facility]            Patient Active Problem List:     Fibromyalgia     Follicular adenoma of thyroid gland     S/P laparoscopic cholecystectomy     Right hip pain     Bilateral leg edema     Chronic fatigue     Left thyroid nodule     Chronic back pain greater than 3 months duration     Shortness of breath     Pulmonary hypertension associated with unclear multi-factorial mechanisms ([address]     Morbid obesity due to excess calories (HCC)     MAYURI (obstructive sleep apnea)     Essential hypertension     Coxitis     History of total hip replacement     Gastroesophageal reflux disease without esophagitis     Polyarthritis

## 2020-12-10 ENCOUNTER — OFFICE VISIT (OUTPATIENT)
Dept: UROLOGY | Age: 60
End: 2020-12-10
Payer: COMMERCIAL

## 2020-12-10 VITALS — BODY MASS INDEX: 61.08 KG/M2 | HEIGHT: 67 IN

## 2020-12-10 PROBLEM — K59.00 CONSTIPATION: Status: ACTIVE | Noted: 2020-12-10

## 2020-12-10 PROBLEM — N39.46 MIXED INCONTINENCE: Status: ACTIVE | Noted: 2020-12-10

## 2020-12-10 PROBLEM — N30.00 ACUTE CYSTITIS WITHOUT HEMATURIA: Status: ACTIVE | Noted: 2020-12-10

## 2020-12-10 PROCEDURE — 99244 OFF/OP CNSLTJ NEW/EST MOD 40: CPT | Performed by: UROLOGY

## 2020-12-10 RX ORDER — SPIRONOLACTONE 25 MG/1
25 TABLET ORAL 2 TIMES DAILY
Status: ON HOLD | COMMUNITY
End: 2021-02-05

## 2020-12-10 ASSESSMENT — ENCOUNTER SYMPTOMS
ABDOMINAL PAIN: 0
EYE PAIN: 0
EYE REDNESS: 0
COUGH: 0
NAUSEA: 0
VOMITING: 0
SHORTNESS OF BREATH: 0
COLOR CHANGE: 0
WHEEZING: 0
BACK PAIN: 0

## 2020-12-10 NOTE — PROGRESS NOTES
HPI:        Patient is a 61 y.o. female in no acute distress. She is alert and oriented to person, place, and time. Patient being seen here as a new patient. Patient was referred here by ASHLEY Wahl. Patient was referred here for microhematuria. Patient did have a recent positive urine culture. Patient has had no recent abdominal imaging. Patient's most recent microscopic urinalysis did show only 0-2 RBCs per high-power field. Patient has never seen urology in the past.  Patient has had some issues with incontinence. Per history, unsure if this is actually stress or urge incontinence. It does sound like a mixed incontinence case. Patient has tried oxybutynin recently from primary care. This is not working for her. She would like to stop the medication. I did okay this. She does have a bowel movement every other day.     Past Medical History:   Diagnosis Date    Acid reflux     Bleeding tendency (HCC)     Blood type O+     Bronchitis     Chronic back pain     Depression     Diverticulitis     [facility]    Left leg    Fibromyalgia     Hypertension     Internal hemorrhoid     Left thyroid nodule 7/25/2013    Nausea & vomiting     Pulmonary embolism [facility])      Past Surgical History:   Procedure Laterality Date    CHOLECYSTECTOMY  08/22/2013    laparoscopic    COLONOSCOPY      2012/ Dr. Eduardo Calderon Left      Outpatient Encounter Medications as of 12/10/2020   Medication Sig Dispense Refill    spironolactone (ALDACTONE) 25 MG tablet Take 25 mg by mouth 2 times daily      oxybutynin (DITROPAN XL) 10 MG extended release tablet Take 1 tablet by mouth daily 30 tablet 3    chlorthalidone (HYGROTON) 25 MG tablet Take 25 mg by mouth daily      pramipexole (MIRAPEX) 0.25 MG tablet Take 1 tablet by mouth nightly 30 tablet 2    amitriptyline (ELAVIL) 100 MG tablet Take 1 tablet by mouth nightly 90 tablet 1    celecoxib (CELEBREX) 100 MG capsule Take 1 capsule by mouth 2 times daily 180 capsule 1    furosemide (LASIX) 20 MG tablet Take 1 tablet by mouth daily 30 tablet 11    omeprazole (PRILOSEC) 20 MG delayed release capsule Take 1 capsule by mouth daily 90 capsule 1    dilTIAZem (CARDIZEM CD) 240 MG extended release capsule Take 1 capsule by mouth daily 90 capsule 3    flecainide (TAMBOCOR) 50 MG tablet Take 1 tablet by mouth 2 times daily 60 tablet 11    metoprolol succinate (TOPROL XL) 50 MG extended release tablet Take 1 tablet by mouth daily 90 tablet 3    mirabegron (MYRBETRIQ) 25 MG TB24 Take 1 tablet by mouth daily (Patient not taking: Reported on 12/10/2020) 30 tablet 2    Elastic Bandages & Supports (MEDICAL COMPRESSION STOCKINGS) MISC 1 each by Does not apply route daily Knee high 20mmHg (Patient not taking: Reported on 11/3/2020) 1 each 0     No facility-administered encounter medications on file as of 12/10/2020.        Current Outpatient Medications on File Prior to Visit   Medication Sig Dispense Refill    spironolactone (ALDACTONE) 25 MG tablet Take 25 mg by mouth 2 times daily      oxybutynin (DITROPAN XL) 10 MG extended release tablet Take 1 tablet by mouth daily 30 tablet 3    chlorthalidone (HYGROTON) 25 MG tablet Take 25 mg by mouth daily      pramipexole (MIRAPEX) 0.25 MG tablet Take 1 tablet by mouth nightly 30 tablet 2    amitriptyline (ELAVIL) 100 MG tablet Take 1 tablet by mouth nightly 90 tablet 1    celecoxib (CELEBREX) 100 MG capsule Take 1 capsule by mouth 2 times daily 180 capsule 1    furosemide (LASIX) 20 MG tablet Take 1 tablet by mouth daily 30 tablet 11    omeprazole (PRILOSEC) 20 MG delayed release capsule Take 1 capsule by mouth daily 90 capsule 1    dilTIAZem (CARDIZEM CD) 240 MG extended release capsule Take 1 capsule by mouth daily 90 capsule 3    flecainide (TAMBOCOR) 50 MG tablet Take 1 tablet by mouth 2 times daily 60 tablet 11    grossly intact. Psych: Mood and affect normal.  Skin: Warm, dry  HEENT: normocephalic, atraumatic  Lymphatics: No palpable lymphadenopathy  Lungs: Respiratory effort normal, unlabored  Cardiovascular:  Normal peripheral pulses  Abdomen: Soft, non-tender, non-distended with no organomegaly or palpable masses. : No CVA tenderness. Bladder non-tender and not distended. Pelvic: deferred    Lab Results   Component Value Date    BUN 14 11/03/2020     Lab Results   Component Value Date    CREATININE 1.27 (H) 11/03/2020       ASSESSMENT:  This is a 61 y.o. female with the following diagnoses:   Diagnosis Orders   1. Acute cystitis without hematuria  Urinalysis with Microscopic    Culture, Urine         PLAN:  At this point time patient does not have significant microscopic hematuria. I will not offer formal work-up. I will recheck her urine to see if she does have more blood in her urine and then I will offer. She does have mixed incontinence. I offered her several options for therapy today. She does not wish to pursue any of these. She does have constipation as well. I did offer her MiraLAX. She did not want to take any MiraLAX. If patient's urine is negative for significant microscopic hematuria she will follow-up here as needed. I did tell her to call back if she would like any assistance with her issues with constipation or urinary incontinence.

## 2020-12-11 ENCOUNTER — HOSPITAL ENCOUNTER (OUTPATIENT)
Age: 60
Setting detail: SPECIMEN
Discharge: HOME | End: 2020-12-11
Payer: COMMERCIAL

## 2020-12-12 ENCOUNTER — HOSPITAL ENCOUNTER (OUTPATIENT)
Age: 60
Setting detail: SPECIMEN
Discharge: HOME | End: 2020-12-12
Payer: COMMERCIAL

## 2020-12-12 LAB
-: ABNORMAL
AMORPHOUS: ABNORMAL
BACTERIA: ABNORMAL
BILIRUBIN URINE: NEGATIVE
CASTS UA: ABNORMAL /LPF
COLOR: YELLOW
COMMENT UA: ABNORMAL
CRYSTALS, UA: ABNORMAL /HPF
EPITHELIAL CELLS UA: ABNORMAL /HPF
GLUCOSE URINE: NEGATIVE
KETONES, URINE: NEGATIVE
LEUKOCYTE ESTERASE, URINE: ABNORMAL
MUCUS: ABNORMAL
NITRITE, URINE: NEGATIVE
OTHER OBSERVATIONS UA: ABNORMAL
PH UA: 6 (ref 5–8)
PROTEIN UA: NEGATIVE
RBC UA: ABNORMAL /HPF (ref 0–2)
RENAL EPITHELIAL, UA: ABNORMAL /HPF
SPECIFIC GRAVITY UA: 1.01 (ref 1–1.03)
TRICHOMONAS: ABNORMAL
TURBIDITY: CLEAR
URINE HGB: NEGATIVE
UROBILINOGEN, URINE: NORMAL
WBC UA: ABNORMAL /HPF
YEAST: ABNORMAL

## 2020-12-12 PROCEDURE — 81001 URINALYSIS AUTO W/SCOPE: CPT

## 2020-12-12 PROCEDURE — 87086 URINE CULTURE/COLONY COUNT: CPT

## 2020-12-13 LAB
CULTURE: NORMAL
Lab: NORMAL
SPECIMEN DESCRIPTION: NORMAL

## 2020-12-14 ENCOUNTER — TELEPHONE (OUTPATIENT)
Dept: UROLOGY | Age: 60
End: 2020-12-14

## 2020-12-14 NOTE — TELEPHONE ENCOUNTER
----- Message from Rauqel York, ASHLEY - CNP sent at 12/14/2020 12:33 PM EST -----  Call pt - urine cx reviewed and negative for UTI & for significant microhematuria

## 2020-12-17 NOTE — TELEPHONE ENCOUNTER
eliquis 5 mg    Postal Prescription Services    Health Maintenance   Topic Date Due    DTaP/Tdap/Td vaccine (1 - Tdap) 04/15/1979    Shingles Vaccine (1 of 2) 04/15/2010    Cervical cancer screen  09/15/2017    Breast cancer screen  05/10/2018    A1C test (Diabetic or Prediabetic)  10/27/2021    Potassium monitoring  11/03/2021    Creatinine monitoring  11/03/2021    Colon cancer screen colonoscopy  04/05/2022    Lipid screen  08/28/2025    Flu vaccine  Completed    Hepatitis C screen  Addressed    HIV screen  Addressed    Hepatitis A vaccine  Aged Out    Hepatitis B vaccine  Aged Out    Hib vaccine  Aged Out    Meningococcal (ACWY) vaccine  Aged Out    Pneumococcal 0-64 years Vaccine  Aged Out             (applicable per patient's age: Cancer Screenings, Depression Screening, Fall Risk Screening, Immunizations)    Hemoglobin A1C (%)   Date Value   10/27/2020 6.1   06/17/2019 6.1   01/04/2016 5.4     LDL Cholesterol (mg/dL)   Date Value   08/28/2020 102     LDL Calculated (mg/dL)   Date Value   05/23/2019 131     AST (U/L)   Date Value   11/03/2020 30     ALT (U/L)   Date Value   11/03/2020 38 (H)     BUN (mg/dL)   Date Value   11/03/2020 14      (goal A1C is < 7)   (goal LDL is <100) need 30-50% reduction from baseline     BP Readings from Last 3 Encounters:   11/03/20 128/70   10/25/20 125/74   09/24/20 (!) 145/77    (goal /80)      All Future Testing planned in [facility]:  Lab Frequency Next Occurrence   Basic Metabolic Panel Once 33/89/0903   TSH with Reflex Once 12/24/2020   CBC Auto Differential Once 12/24/2020   Comprehensive Metabolic Panel Once 71/31/9032   Vitamin D 25 Hydroxy Once 12/24/2020   Lipid Panel Once 12/24/2020   Magnesium Once 12/24/2020   Comprehensive Metabolic Panel Once 05/51/9777   EKG 12 Lead Once 03/30/2021       Next Visit Date:  Future Appointments   Date Time Provider Kya Trimble   3/30/2021 10:00 AM MD Pablito Gonsalez [facility]            Patient

## 2021-01-04 ENCOUNTER — TELEPHONE (OUTPATIENT)
Dept: FAMILY MEDICINE CLINIC | Age: 61
End: 2021-01-04

## 2021-01-04 NOTE — TELEPHONE ENCOUNTER
Marifer said she was put on pramipexole 0.25 mg for her restless leg. She said that it is not working and would like to know if she can get something else called in for her. Please let Marifer know. This can wait until Tuesday.     Health Maintenance   Topic Date Due    DTaP/Tdap/Td vaccine (1 - Tdap) 04/15/1979    Shingles Vaccine (1 of 2) 04/15/2010    Cervical cancer screen  09/15/2017    Breast cancer screen  05/10/2018    A1C test (Diabetic or Prediabetic)  10/27/2021    Potassium monitoring  11/03/2021    Creatinine monitoring  11/03/2021    Colon cancer screen colonoscopy  04/05/2022    Lipid screen  08/28/2025    Flu vaccine  Completed    Hepatitis C screen  Addressed    HIV screen  Addressed    Hepatitis A vaccine  Aged Out    Hepatitis B vaccine  Aged Out    Hib vaccine  Aged Out    Meningococcal (ACWY) vaccine  Aged Out    Pneumococcal 0-64 years Vaccine  Aged Out             (applicable per patient's age: Cancer Screenings, Depression Screening, Fall Risk Screening, Immunizations)    Hemoglobin A1C (%)   Date Value   10/27/2020 6.1   06/17/2019 6.1   01/04/2016 5.4     LDL Cholesterol (mg/dL)   Date Value   08/28/2020 102     LDL Calculated (mg/dL)   Date Value   05/23/2019 131     AST (U/L)   Date Value   11/03/2020 30     ALT (U/L)   Date Value   11/03/2020 38 (H)     BUN (mg/dL)   Date Value   11/03/2020 14      (goal A1C is < 7)   (goal LDL is <100) need 30-50% reduction from baseline     BP Readings from Last 3 Encounters:   11/03/20 128/70   10/25/20 125/74   09/24/20 (!) 145/77    (goal /80)      All Future Testing planned in [facility]:  Lab Frequency Next Occurrence   Basic Metabolic Panel Once 85/22/7045   TSH with Reflex Once 03/23/2021   CBC Auto Differential Once 03/23/2021   Comprehensive Metabolic Panel Once 44/84/3812   Vitamin D 25 Hydroxy Once 03/23/2021   Lipid Panel Once 03/23/2021   Magnesium Once 03/23/2021   Comprehensive Metabolic Panel Once 80/99/4856   EKG 12 Lead Once 03/30/2021       Next Visit Date:  Future Appointments   Date Time Provider Kya Nai   3/30/2021 10:00 AM MD Lizzette Romero [facility]            Patient Active Problem List:     Fibromyalgia     Follicular adenoma of thyroid gland     S/P laparoscopic cholecystectomy     Right hip pain     Bilateral leg edema     Chronic fatigue     Left thyroid nodule     Chronic back pain greater than 3 months duration     Shortness of breath     Pulmonary hypertension associated with unclear multi-factorial mechanisms ([address]     Morbid obesity due to excess calories (HCC)     MAYURI (obstructive sleep apnea)     Essential hypertension     Coxitis     History of total hip replacement     Gastroesophageal reflux disease without esophagitis     Polyarthritis     Mixed incontinence     Constipation     Acute cystitis without hematuria

## 2021-01-05 RX ORDER — ROPINIROLE 0.25 MG/1
0.25 TABLET, FILM COATED ORAL 3 TIMES DAILY
Qty: 90 TABLET | Refills: 0 | Status: SHIPPED | OUTPATIENT
Start: 2021-01-05 | End: 2021-01-21 | Stop reason: SDUPTHER

## 2021-01-05 NOTE — TELEPHONE ENCOUNTER
Please tell pt that she may get better results taking this medication three times a day instead of just at bedtime. The dose can also be increased if we need to. Come see me (or virtual) in about 2 weeks.

## 2021-01-05 NOTE — TELEPHONE ENCOUNTER
Please find out if pt wants to go back to neurontin. I'm not sure why we changed away from it, I can't find documentation that tells why we went away. There is also requip we could try for RLS.

## 2021-01-21 ENCOUNTER — OFFICE VISIT (OUTPATIENT)
Dept: FAMILY MEDICINE CLINIC | Age: 61
End: 2021-01-21
Payer: COMMERCIAL

## 2021-01-21 VITALS
OXYGEN SATURATION: 96 % | HEART RATE: 62 BPM | DIASTOLIC BLOOD PRESSURE: 82 MMHG | SYSTOLIC BLOOD PRESSURE: 122 MMHG | TEMPERATURE: 98.3 F

## 2021-01-21 DIAGNOSIS — G25.81 RLS (RESTLESS LEGS SYNDROME): Primary | ICD-10-CM

## 2021-01-21 PROCEDURE — 99213 OFFICE O/P EST LOW 20 MIN: CPT | Performed by: NURSE PRACTITIONER

## 2021-01-21 RX ORDER — ROPINIROLE 0.5 MG/1
0.5 TABLET, FILM COATED ORAL 3 TIMES DAILY
Qty: 90 TABLET | Refills: 2 | Status: SHIPPED | OUTPATIENT
Start: 2021-01-21 | End: 2021-05-03 | Stop reason: SDUPTHER

## 2021-01-21 ASSESSMENT — PATIENT HEALTH QUESTIONNAIRE - PHQ9
2. FEELING DOWN, DEPRESSED OR HOPELESS: 0
SUM OF ALL RESPONSES TO PHQ QUESTIONS 1-9: 0
SUM OF ALL RESPONSES TO PHQ9 QUESTIONS 1 & 2: 0
SUM OF ALL RESPONSES TO PHQ QUESTIONS 1-9: 0
1. LITTLE INTEREST OR PLEASURE IN DOING THINGS: 0

## 2021-01-21 ASSESSMENT — ENCOUNTER SYMPTOMS
NAUSEA: 0
SHORTNESS OF BREATH: 0
DIARRHEA: 0
COUGH: 0
VOMITING: 0

## 2021-01-21 NOTE — PATIENT INSTRUCTIONS
SURVEY:    You may be receiving a survey from [employer] regarding your visit today. Please complete the survey to enable us to provide the highest quality of care to you and your family. If you cannot score us a very good on any question, please call the office to discuss how we could of made your experience a very good one. Thank you.

## 2021-01-21 NOTE — PROGRESS NOTES
HPI Notes    Name: Lisa Mark  : 1960         Chief Complaint:     Chief Complaint   Patient presents with    Leg Pain     Patient is here for follow up on Requip. States it has helped but thinks it may need to be a little stronger.  Health Maintenance     Patient refuses mammogram. States she does not do them. History of Present Illness:        HPI  Pt is a 60 yo female who presents for follow up exam. Pt was changed from mirapex to requip for RLS. Pt also started taking the medication TID. Pt states that the dosing 0.25mg PO TID is helping but feels like it could be better. Past Medical History:     Past Medical History:   Diagnosis Date    Acid reflux     Bleeding tendency (HCC)     Blood type O+     Bronchitis     Chronic back pain     Depression     Diverticulitis     DVT [facility]deep venous thrombosis) ([facility] [address]     Left leg    Fibromyalgia     Hypertension     Internal hemorrhoid     Left thyroid nodule 2013    Nausea & vomiting     Pulmonary embolism ([facility] [address]       Reviewed all health maintenance requirements and ordered appropriate tests  Health Maintenance Due   Topic Date Due    DTaP/Tdap/Td vaccine (1 - Tdap) 04/15/1979    Shingles Vaccine (1 of 2) 04/15/2010    Cervical cancer screen  09/15/2017    Breast cancer screen  05/10/2018       Past Surgical History:     Past Surgical History:   Procedure Laterality Date    CHOLECYSTECTOMY  2013    laparoscopic    COLONOSCOPY      / Dr. Aaron Mcdonald Left         Medications:       Prior to Admission medications    Medication Sig Start Date End Date Taking?  Authorizing Provider   rOPINIRole (REQUIP) 0.5 MG tablet Take 1 tablet by mouth 3 times daily 21  Yes ASHLEY North - CNP   apixaban (ELIQUIS) 5 MG TABS tablet Take 1 tablet by mouth 2 times daily 12/17/20 6/15/21 Yes Mague Anaya DO   spironolactone (ALDACTONE) 25 MG tablet Take 25 mg by mouth 2 times daily   Yes Historical Provider, MD   chlorthalidone (HYGROTON) 25 MG tablet Take 25 mg by mouth daily   Yes Historical Provider, MD   amitriptyline (ELAVIL) 100 MG tablet Take 1 tablet by mouth nightly 8/31/20  Yes Barbarann Phalen, APRN - CNP   celecoxib (CELEBREX) 100 MG capsule Take 1 capsule by mouth 2 times daily 8/31/20  Yes Barbarann Phalen, APRN - CNP   furosemide (LASIX) 20 MG tablet Take 1 tablet by mouth daily 8/31/20  Yes Jt Johansen MD   omeprazole (PRILOSEC) 20 MG delayed release capsule Take 1 capsule by mouth daily 8/17/20  Yes Jennifer Gray DO   dilTIAZem (CARDIZEM CD) 240 MG extended release capsule Take 1 capsule by mouth daily 7/27/20 1/23/21 Yes Jt Johansen MD   flecainide (TAMBOCOR) 50 MG tablet Take 1 tablet by mouth 2 times daily 7/27/20  Yes Jt Johansen MD   metoprolol succinate (TOPROL XL) 50 MG extended release tablet Take 1 tablet by mouth daily 7/27/20   Jt Johansen MD   [employer] Supp[employer] ([address] [place] [address]) [address] 1 each by Does not apply route daily Knee high 20mmHg  Patient not taking: Reported on 11/3/2020 1/3/19   Barbarann Phalen, APRN - CNP        Allergies:       Ampicillin and Sulfa antibiotics    Social History:     Tobacco:    reports that she has never smoked. She has never used smokeless tobacco.  Alcohol:      reports no history of alcohol use. Drug Use:  reports no history of drug use. Family History:        Family History   Problem Relation Age of Onset    Stroke Father     Diabetes Sister     Thyroid Disease Brother     Atrial Fibrillation Brother     Atrial Fibrillation Mother        Review of Systems:         Review of Systems   Constitutional: Negative for chills and fever. Respiratory: Negative for cough and shortness of breath. Cardiovascular: Negative for chest pain and palpitations. Gastrointestinal: Negative for diarrhea, nausea and vomiting.    Neurological: Negative for dizziness, seizures and headaches. Physical Exam:     Vitals:  /82   Pulse 62   Temp 98.3 °F (36.8 °C) (Oral)   SpO2 96%       Physical Exam  Vitals signs and nursing note reviewed. Constitutional:       Appearance: Normal appearance. She is well-developed. Cardiovascular:      Rate and Rhythm: Normal rate and regular rhythm. Heart sounds: Normal heart sounds, S1 normal and S2 normal.   Pulmonary:      Effort: Pulmonary effort is normal. No respiratory distress. Breath sounds: Normal breath sounds. Abdominal:      General: Bowel sounds are normal.      Palpations: Abdomen is soft. Tenderness: There is no abdominal tenderness. Skin:     General: Skin is warm and dry. Neurological:      Mental Status: She is alert and oriented to person, place, and time. Data:     Lab Results   Component Value Date     11/03/2020    K 4.4 11/03/2020    CL 99 11/03/2020    CO2 27 11/03/2020    BUN 14 11/03/2020    CREATININE 1.27 11/03/2020    GLUCOSE 123 11/03/2020    PROT 7.3 11/03/2020    LABALBU 4.1 11/03/2020    BILITOT 0.39 11/03/2020    ALKPHOS 120 11/03/2020    AST 30 11/03/2020    ALT 38 11/03/2020     Lab Results   Component Value Date    WBC 7.1 11/03/2020    RBC 4.84 11/03/2020    RBC 4.17 05/15/2012    HGB 12.8 11/03/2020    HCT 39.6 11/03/2020    MCV 81.8 11/03/2020    MCH 26.5 11/03/2020    MCHC 32.3 11/03/2020    RDW 17.8 11/03/2020     11/03/2020     05/15/2012    MPV NOT REPORTED 11/03/2020     Lab Results   Component Value Date    TSH 3.06 08/28/2020     Lab Results   Component Value Date    CHOL 176 08/28/2020    CHOL 207 05/23/2019    HDL 46 08/28/2020    LABA1C 6.1 10/27/2020          Assessment & Plan        Diagnosis Orders   1. RLS (restless legs syndrome)       Will increase Requip to 0.5 mg p.o. 3 times daily. Patient educated to do some exercise throughout the day to help with fibromyalgia and/or restless leg syndrome.     Patient verbalizes understanding and agreement with plan. All questions answered. If symptoms do not resolve or worsen, return to office. Completed Refills   Requested Prescriptions     Signed Prescriptions Disp Refills    rOPINIRole (REQUIP) 0.5 MG tablet 90 tablet 2     Sig: Take 1 tablet by mouth 3 times daily     No follow-ups on file. Orders Placed This Encounter   Medications    rOPINIRole (REQUIP) 0.5 MG tablet     Sig: Take 1 tablet by mouth 3 times daily     Dispense:  90 tablet     Refill:  2     No orders of the defined types were placed in this encounter. Patient Instructions     SURVEY:    You may be receiving a survey from [email or web address] regarding your visit today. Please complete the survey to enable us to provide the highest quality of care to you and your family. If you cannot score us a very good on any question, please call the office to discuss how we could of made your experience a very good one. Thank you. Electronically signed by ASHLEY Vaughn CNP on 1/21/2021 at 11:54 AM           Completed Refills      Requested Prescriptions     Signed Prescriptions Disp Refills    rOPINIRole (REQUIP) 0.5 MG tablet 90 tablet 2     Sig: Take 1 tablet by mouth 3 times daily         Marifer received counseling on the following healthy behaviors: medication adherence, exercise  Reviewed prior labs and health maintenance. Continue current medications, diet and exercise. Discussed use, benefit, and side effects of prescribed medications. Barriers to medication compliance addressed. Patient given educational materials - see patient instructions. All patient questions answered. Patient voiced understanding.

## 2021-01-23 DIAGNOSIS — I10 ESSENTIAL HYPERTENSION: ICD-10-CM

## 2021-01-25 RX ORDER — CELECOXIB 100 MG/1
100 CAPSULE ORAL 2 TIMES DAILY
Qty: 180 CAPSULE | Refills: 1 | Status: SHIPPED | OUTPATIENT
Start: 2021-01-25 | End: 2021-07-13 | Stop reason: SDUPTHER

## 2021-01-25 RX ORDER — OMEPRAZOLE 20 MG/1
20 CAPSULE, DELAYED RELEASE ORAL DAILY
Qty: 90 CAPSULE | Refills: 1 | Status: SHIPPED
Start: 2021-01-25 | End: 2021-02-15 | Stop reason: SINTOL

## 2021-01-25 RX ORDER — FUROSEMIDE 20 MG/1
20 TABLET ORAL DAILY
Qty: 30 TABLET | Refills: 11 | Status: SHIPPED | OUTPATIENT
Start: 2021-01-25 | End: 2021-07-12

## 2021-01-27 NOTE — TELEPHONE ENCOUNTER
Patient is using a new pharmacy and is asking for a RX of Eliquis 5 mg to be faxed. She states it needs to be a 90 RX with 3 refills. Pharmacy name is [facility] fax # [phone].     Health Maintenance   Topic Date Due    DTaP/Tdap/Td vaccine (1 - Tdap) 04/15/1979    Shingles Vaccine (1 of 2) 04/15/2010    Cervical cancer screen  09/15/2017    Breast cancer screen  05/10/2018    A1C test (Diabetic or Prediabetic)  10/27/2021    Potassium monitoring  11/03/2021    Creatinine monitoring  11/03/2021    Colon cancer screen colonoscopy  04/05/2022    Lipid screen  08/28/2025    Flu vaccine  Completed    Hepatitis C screen  Addressed    HIV screen  Addressed    Hepatitis A vaccine  Aged Out    Hepatitis B vaccine  Aged Out    Hib vaccine  Aged Out    Meningococcal (ACWY) vaccine  Aged Out    Pneumococcal 0-64 years Vaccine  Aged Out             (applicable per patient's age: Cancer Screenings, Depression Screening, Fall Risk Screening, Immunizations)    Hemoglobin A1C (%)   Date Value   10/27/2020 6.1   06/17/2019 6.1   01/04/2016 5.4     LDL Cholesterol (mg/dL)   Date Value   08/28/2020 102     LDL Calculated (mg/dL)   Date Value   05/23/2019 131     AST (U/L)   Date Value   11/03/2020 30     ALT (U/L)   Date Value   11/03/2020 38 (H)     BUN (mg/dL)   Date Value   11/03/2020 14      (goal A1C is < 7)   (goal LDL is <100) need 30-50% reduction from baseline     BP Readings from Last 3 Encounters:   01/21/21 122/82   11/03/20 128/70   10/25/20 125/74    (goal /80)      All Future Testing planned in [facility]:  Lab Frequency Next Occurrence   Basic Metabolic Panel Once 26/22/1891   TSH with Reflex Once 03/23/2021   CBC Auto Differential Once 03/23/2021   Comprehensive Metabolic Panel Once 70/41/2463   Vitamin D 25 Hydroxy Once 03/23/2021   Lipid Panel Once 03/23/2021   Magnesium Once 03/23/2021   Comprehensive Metabolic Panel Once 39/89/0817   EKG 12 Lead Once 03/30/2021       Next Visit Date:  Future Appointments   Date Time Provider Kya Trimble   3/30/2021 10:00 AM MD Lizzette Romero [facility]            Patient Active Problem List:     Fibromyalgia     Follicular adenoma of thyroid gland     S/P laparoscopic cholecystectomy     Right hip pain     Bilateral leg edema     Chronic fatigue     Left thyroid nodule     Chronic back pain greater than 3 months duration     Shortness of breath     Pulmonary hypertension associated with unclear multi-factorial mechanisms ([address]     Morbid obesity due to excess calories (HCC)     MAYURI (obstructive sleep apnea)     Essential hypertension     Coxitis     History of total hip replacement     Gastroesophageal reflux disease without esophagitis     Polyarthritis     Mixed incontinence     Constipation     Acute cystitis without hematuria     RLS (restless legs syndrome)

## 2021-01-31 ENCOUNTER — HOSPITAL ENCOUNTER (EMERGENCY)
Age: 61
Discharge: HOME | End: 2021-01-31
Attending: EMERGENCY MEDICINE
Payer: COMMERCIAL

## 2021-01-31 VITALS
SYSTOLIC BLOOD PRESSURE: 124 MMHG | OXYGEN SATURATION: 98 % | WEIGHT: 293 LBS | RESPIRATION RATE: 16 BRPM | HEIGHT: 67 IN | DIASTOLIC BLOOD PRESSURE: 63 MMHG | HEART RATE: 92 BPM | BODY MASS INDEX: 45.99 KG/M2 | TEMPERATURE: 98.8 F

## 2021-01-31 DIAGNOSIS — N39.0 URINARY TRACT INFECTION WITHOUT HEMATURIA, SITE UNSPECIFIED: Primary | ICD-10-CM

## 2021-01-31 DIAGNOSIS — R53.83 FATIGUE, UNSPECIFIED TYPE: ICD-10-CM

## 2021-01-31 LAB
-: ABNORMAL
ABSOLUTE BANDS #: 0.63 K/UL (ref 0–1)
ABSOLUTE EOS #: ABNORMAL K/UL (ref 0–0.4)
ABSOLUTE IMMATURE GRANULOCYTE: ABNORMAL K/UL (ref 0–0.3)
ABSOLUTE LYMPH #: 1.06 K/UL (ref 1–4.8)
ABSOLUTE MONO #: 1.48 K/UL (ref 0–1)
AMORPHOUS: ABNORMAL
ANION GAP SERPL CALCULATED.3IONS-SCNC: 12 MMOL/L (ref 9–17)
BACTERIA: ABNORMAL
BANDS: 3 % (ref 0–10)
BASOPHILS # BLD: ABNORMAL % (ref 0–2)
BASOPHILS ABSOLUTE: ABNORMAL K/UL (ref 0–0.2)
BILIRUBIN URINE: ABNORMAL
BUN BLDV-MCNC: 24 MG/DL (ref 8–23)
BUN/CREAT BLD: 14 (ref 9–20)
CALCIUM SERPL-MCNC: 10 MG/DL (ref 8.6–10.4)
CASTS UA: ABNORMAL /LPF
CHLORIDE BLD-SCNC: 96 MMOL/L (ref 98–107)
CO2: 25 MMOL/L (ref 20–31)
COLOR: YELLOW
COMMENT UA: ABNORMAL
CREAT SERPL-MCNC: 1.67 MG/DL (ref 0.5–0.9)
CRYSTALS, UA: ABNORMAL /HPF
DIFFERENTIAL TYPE: ABNORMAL
EOSINOPHILS RELATIVE PERCENT: ABNORMAL % (ref 0–5)
EPITHELIAL CELLS UA: ABNORMAL /HPF
GFR AFRICAN AMERICAN: 38 ML/MIN
GFR NON-AFRICAN AMERICAN: 31 ML/MIN
GFR SERPL CREATININE-BSD FRML MDRD: ABNORMAL ML/MIN/{1.73_M2}
GFR SERPL CREATININE-BSD FRML MDRD: ABNORMAL ML/MIN/{1.73_M2}
GLUCOSE BLD-MCNC: 143 MG/DL (ref 70–99)
GLUCOSE URINE: NEGATIVE
HCT VFR BLD CALC: 41.3 % (ref 36–46)
HEMOGLOBIN: 13.9 G/DL (ref 12–16)
IMMATURE GRANULOCYTES: ABNORMAL %
KETONES, URINE: ABNORMAL
LACTIC ACID, SEPSIS WHOLE BLOOD: NORMAL MMOL/L (ref 0.5–1.9)
LACTIC ACID, SEPSIS: 1.8 MMOL/L (ref 0.5–1.9)
LEUKOCYTE ESTERASE, URINE: ABNORMAL
LYMPHOCYTES # BLD: 5 % (ref 15–40)
MCH RBC QN AUTO: 28.6 PG (ref 26–34)
MCHC RBC AUTO-ENTMCNC: 33.7 G/DL (ref 31–37)
MCV RBC AUTO: 84.9 FL (ref 80–100)
MONOCYTES # BLD: 7 % (ref 4–8)
MORPHOLOGY: ABNORMAL
MUCUS: ABNORMAL
NITRITE, URINE: NEGATIVE
NRBC AUTOMATED: ABNORMAL PER 100 WBC
OTHER OBSERVATIONS UA: ABNORMAL
PDW BLD-RTO: 16.1 % (ref 12.1–15.2)
PH UA: 5 (ref 5–8)
PLATELET # BLD: 255 K/UL (ref 140–450)
PLATELET ESTIMATE: ABNORMAL
PMV BLD AUTO: ABNORMAL FL (ref 6–12)
POTASSIUM SERPL-SCNC: 5.4 MMOL/L (ref 3.7–5.3)
PROTEIN UA: ABNORMAL
RBC # BLD: 4.86 M/UL (ref 4–5.2)
RBC # BLD: ABNORMAL 10*6/UL
RBC UA: ABNORMAL /HPF (ref 0–2)
RENAL EPITHELIAL, UA: ABNORMAL /HPF
SARS-COV-2, RAPID: NOT DETECTED
SARS-COV-2: NORMAL
SARS-COV-2: NORMAL
SEG NEUTROPHILS: 85 % (ref 47–75)
SEGMENTED NEUTROPHILS ABSOLUTE COUNT: 17.93 K/UL (ref 2.5–7)
SODIUM BLD-SCNC: 133 MMOL/L (ref 135–144)
SOURCE: NORMAL
SPECIFIC GRAVITY UA: 1.03 (ref 1–1.03)
TRICHOMONAS: ABNORMAL
TURBIDITY: CLEAR
URINE HGB: ABNORMAL
UROBILINOGEN, URINE: NORMAL
WBC # BLD: 21.1 K/UL (ref 3.5–11)
WBC # BLD: ABNORMAL 10*3/UL
WBC UA: ABNORMAL /HPF
YEAST: ABNORMAL

## 2021-01-31 PROCEDURE — 87077 CULTURE AEROBIC IDENTIFY: CPT

## 2021-01-31 PROCEDURE — 85025 COMPLETE CBC W/AUTO DIFF WBC: CPT

## 2021-01-31 PROCEDURE — 83605 ASSAY OF LACTIC ACID: CPT

## 2021-01-31 PROCEDURE — 96365 THER/PROPH/DIAG IV INF INIT: CPT

## 2021-01-31 PROCEDURE — U0002 COVID-19 LAB TEST NON-CDC: HCPCS

## 2021-01-31 PROCEDURE — 87086 URINE CULTURE/COLONY COUNT: CPT

## 2021-01-31 PROCEDURE — 2580000003 HC RX 258: Performed by: EMERGENCY MEDICINE

## 2021-01-31 PROCEDURE — 81001 URINALYSIS AUTO W/SCOPE: CPT

## 2021-01-31 PROCEDURE — 99284 EMERGENCY DEPT VISIT MOD MDM: CPT

## 2021-01-31 PROCEDURE — 6360000002 HC RX W HCPCS: Performed by: EMERGENCY MEDICINE

## 2021-01-31 PROCEDURE — 87186 SC STD MICRODIL/AGAR DIL: CPT

## 2021-01-31 PROCEDURE — 36415 COLL VENOUS BLD VENIPUNCTURE: CPT

## 2021-01-31 PROCEDURE — 80048 BASIC METABOLIC PNL TOTAL CA: CPT

## 2021-01-31 RX ORDER — CIPROFLOXACIN 2 MG/ML
400 INJECTION, SOLUTION INTRAVENOUS ONCE
Status: COMPLETED | OUTPATIENT
Start: 2021-01-31 | End: 2021-01-31

## 2021-01-31 RX ORDER — CIPROFLOXACIN 500 MG/1
500 TABLET, FILM COATED ORAL 2 TIMES DAILY
Qty: 14 TABLET | Refills: 0 | Status: SHIPPED | OUTPATIENT
Start: 2021-01-31 | End: 2021-02-02

## 2021-01-31 RX ORDER — 0.9 % SODIUM CHLORIDE 0.9 %
1000 INTRAVENOUS SOLUTION INTRAVENOUS ONCE
Status: COMPLETED | OUTPATIENT
Start: 2021-01-31 | End: 2021-01-31

## 2021-01-31 RX ORDER — 0.9 % SODIUM CHLORIDE 0.9 %
500 INTRAVENOUS SOLUTION INTRAVENOUS ONCE
Status: COMPLETED | OUTPATIENT
Start: 2021-01-31 | End: 2021-01-31

## 2021-01-31 RX ADMIN — SODIUM CHLORIDE 1000 ML: 9 INJECTION, SOLUTION INTRAVENOUS at 21:15

## 2021-01-31 RX ADMIN — SODIUM CHLORIDE 500 ML: 9 INJECTION, SOLUTION INTRAVENOUS at 22:27

## 2021-01-31 RX ADMIN — CIPROFLOXACIN 400 MG: 2 INJECTION, SOLUTION INTRAVENOUS at 22:28

## 2021-01-31 ASSESSMENT — ENCOUNTER SYMPTOMS
TROUBLE SWALLOWING: 0
SHORTNESS OF BREATH: 0
SORE THROAT: 0
ABDOMINAL PAIN: 0
COUGH: 0
COLOR CHANGE: 0
DIARRHEA: 0
EYE PAIN: 0
VOMITING: 0
NAUSEA: 0
EYE REDNESS: 0
BACK PAIN: 0

## 2021-02-01 NOTE — ED PROVIDER NOTES
Diverticulitis     DVT (deep venous thrombosis) ([facility] [address] 2012    Left leg    Fibromyalgia     Hypertension     Internal hemorrhoid     Left thyroid nodule 7/25/2013    Nausea & vomiting     Pulmonary embolism [facility])          SURGICAL HISTORY       Past Surgical History:   Procedure Laterality Date    CHOLECYSTECTOMY  08/22/2013    laparoscopic    COLONOSCOPY      2012/ Dr. Rosa Ruby Right     TOTAL KNEE ARTHROPLASTY Left          ALLERGIES     Ampicillin and Sulfa antibiotics    FAMILY HISTORY       Family History   Problem Relation Age of Onset    Stroke Father     Diabetes Sister     Thyroid Disease Brother     Atrial Fibrillation Brother     Atrial Fibrillation Mother           SOCIAL HISTORY       Social History     Socioeconomic History    Marital status:      Spouse name: None    Number of children: None    Years of education: None    Highest education level: None   Occupational History    None   Social Needs    Financial resource strain: Not hard at all   Total [employer] insecurity     Worry: Never true     Inability: Never true    Transportation needs     Medical: No     Non-medical: No   Tobacco Use    Smoking status: Never Smoker    Smokeless tobacco: Never Used   Substance and Sexual Activity    Alcohol use: No     Alcohol/week: 0.0 standard drinks     Comment: once in 10 years may have a glass wine    Drug use: No    Sexual activity: Yes     Partners: Male   Lifestyle    Physical activity     Days per week: None     Minutes per session: None    Stress: None   Relationships    Social connections     Talks on phone: None     Gets together: None     Attends Sikh service: None     Active member of club or organization: None     Attends meetings of clubs or organizations: None     Relationship status: None    Intimate partner violence     Fear of current or ex partner: None     Emotionally abused: None     Physically abused: None     Forced sexual activity: None   Other Topics Concern    None   Social History Narrative    None           PHYSICAL EXAM    (up to 7 for level 4, 8 ormore for level 5)     ED Triage Vitals [01/31/21 2022]   BP Temp Temp Source Pulse Resp SpO2 Height Weight   122/72 98.8 °F (37.1 °C) Oral 92 16 100 % 5' 7\" (1.702 m) (!) 361 lb 3.2 oz (163.8 kg)       Physical Exam     Physical    Vital signs and nursing notes were reviewed as well as the social, family, and past medical history. Gen. appearance: Patient is alert and oriented and in no acute distress    Head: Atraumatic, normocephalic    Neck: Supple, trachea/thyroid normal    EENT: PERRLA, EOMI, conjunctiva normal.    Skin: Warm and dry with no rash, no signs of cellulitis    Cardiovascular: Heart RRR, no gallops or rubs, no aortic enlargement or bruits noted. Respiratory: Lungs clear, no wheezing, no rales, normal breath sounds. Gastrointestinal: Abdomen morbidly obese, nontender, bowel sounds normal, no rebound/guarding/distention or mass    Musculoskeletal: No tenderness in the extremities, no back or hip pain.     Neurological: Patient is alert and oriented ×3, no focal motor or sensory deficits noted, reflexes normal, NIH = 0      DIAGNOSTIC RESULTS     EKG: All EKG's are interpreted by the Emergency Department Physicianwho either signs or Co-signs this chart in the absence of a cardiologist.        RADIOLOGY:         LABS:  Labs Reviewed   CBC WITH AUTO DIFFERENTIAL - Abnormal; Notable for the following components:       Result Value    WBC 21.1 (*)     RDW 16.1 (*)     Seg Neutrophils 85 (*)     Lymphocytes 5 (*)     Segs Absolute 17.93 (*)     Absolute Mono # 1.48 (*)     All other components within normal limits   BASIC METABOLIC PANEL - Abnormal; Notable for the following components:    Glucose 143 (*)     BUN 24 (*)     CREATININE 1.67 (*)     Sodium 133 (*)     Potassium 5.4 (*)     Chloride 96 (*)     GFR Non- 31 (*)     GFR  38 (*)     All other components within normal limits   URINALYSIS - Abnormal; Notable for the following components:    Bilirubin Urine NEGATIVE  Verified by ictotest. (*)     Ketones, Urine TRACE (*)     Urine Hgb TRACE (*)     Protein, UA 1+ (*)     Leukocyte Esterase, Urine 3+ (*)     All other components within normal limits   MICROSCOPIC URINALYSIS - Abnormal; Notable for the following components:    Bacteria, UA 1+ (*)     All other components within normal limits   CULTURE, URINE   COVID-19   LACTATE, SEPSIS       All other labs were within normal range or not returned as of this dictation. EMERGENCY DEPARTMENT COURSE and DIFFERENTIAL DIAGNOSIS/MDM:   Vitals:    Vitals:    01/31/21 2022   BP: 122/72   Pulse: 92   Resp: 16   Temp: 98.8 °F (37.1 °C)   TempSrc: Oral   SpO2: 100%   Weight: (!) 361 lb 3.2 oz (163.8 kg)   Height: 5' 7\" (1.702 m)                 REASSESSMENT      Here in the ED patient's lactate was less than 2. White count was elevated. Urinary tract infection was noted on urinalysis. A culture was sent. Patient was given 1.5 L of fluids and given 400 mg of Cipro IV. Patient is improving and we will discharge home with Cipro to follow-up with her primary doctor and return if any worsening. PROCEDURES:  Unless otherwise noted below, none     Procedures    FINAL IMPRESSION      1. Urinary tract infection without hematuria, site unspecified    2.  Fatigue, unspecified type          DISPOSITION/PLAN   DISPOSITION Decision To Discharge 01/31/2021 11:07:24 PM      PATIENT REFERRED TO:  ASHLEY Renee - CNP  [phone]    In 2 days        DISCHARGE MEDICATIONS:  New Prescriptions    CIPROFLOXACIN (CIPRO) 500 MG TABLET    Take 1 tablet by mouth 2 times daily for 7 days          (Please note that portions ofthis note were completed with a voice recognition program.  Efforts were made to edit the dictations but occasionally words are mis-transcribed.)    Letha Prasad MD(electronically signed)  Attending Emergency Physician            Letha Prasad MD  01/31/21 [id number]

## 2021-02-01 NOTE — ED NOTES
Pt semi fowlers in bed. Pt denies any needs at this time. Pt denies any pain. Pt call light within reach. Serjio ORELLANA RN  01/31/21 [id number]

## 2021-02-01 NOTE — ED NOTES
Pt semi fowlers in bed. Pt repositioned in bed. Pt updated with plan of care. Pt denies any other needs at this time. Pt call light within reach. Serjio ORELLANA RN  01/31/21 [id number]

## 2021-02-02 ENCOUNTER — HOSPITAL ENCOUNTER (EMERGENCY)
Age: 61
Discharge: HOME | End: 2021-02-02
Attending: EMERGENCY MEDICINE
Payer: COMMERCIAL

## 2021-02-02 ENCOUNTER — APPOINTMENT (OUTPATIENT)
Dept: GENERAL RADIOLOGY | Age: 61
End: 2021-02-02
Payer: COMMERCIAL

## 2021-02-02 ENCOUNTER — OFFICE VISIT (OUTPATIENT)
Dept: FAMILY MEDICINE CLINIC | Age: 61
End: 2021-02-02
Payer: COMMERCIAL

## 2021-02-02 VITALS
HEART RATE: 80 BPM | SYSTOLIC BLOOD PRESSURE: 102 MMHG | DIASTOLIC BLOOD PRESSURE: 70 MMHG | TEMPERATURE: 97.8 F | OXYGEN SATURATION: 98 %

## 2021-02-02 VITALS
HEART RATE: 72 BPM | OXYGEN SATURATION: 99 % | WEIGHT: 293 LBS | SYSTOLIC BLOOD PRESSURE: 120 MMHG | HEIGHT: 67 IN | TEMPERATURE: 97.9 F | DIASTOLIC BLOOD PRESSURE: 71 MMHG | RESPIRATION RATE: 19 BRPM | BODY MASS INDEX: 45.99 KG/M2

## 2021-02-02 DIAGNOSIS — L02.415 CELLULITIS AND ABSCESS OF RIGHT LOWER EXTREMITY: Primary | ICD-10-CM

## 2021-02-02 DIAGNOSIS — N17.9 ACUTE RENAL FAILURE SUPERIMPOSED ON STAGE 3B CHRONIC KIDNEY DISEASE, UNSPECIFIED ACUTE RENAL FAILURE TYPE (HCC): Primary | ICD-10-CM

## 2021-02-02 DIAGNOSIS — N18.32 ACUTE RENAL FAILURE SUPERIMPOSED ON STAGE 3B CHRONIC KIDNEY DISEASE, UNSPECIFIED ACUTE RENAL FAILURE TYPE (HCC): Primary | ICD-10-CM

## 2021-02-02 DIAGNOSIS — R53.83 FATIGUE, UNSPECIFIED TYPE: ICD-10-CM

## 2021-02-02 DIAGNOSIS — L03.115 CELLULITIS AND ABSCESS OF RIGHT LOWER EXTREMITY: Primary | ICD-10-CM

## 2021-02-02 LAB
-: ABNORMAL
ABSOLUTE EOS #: 0 K/UL (ref 0–0.4)
ABSOLUTE IMMATURE GRANULOCYTE: ABNORMAL K/UL (ref 0–0.3)
ABSOLUTE LYMPH #: 0.6 K/UL (ref 1–4.8)
ABSOLUTE MONO #: 0.4 K/UL (ref 0–1)
ALBUMIN SERPL-MCNC: 3.4 G/DL (ref 3.5–5.2)
ALBUMIN/GLOBULIN RATIO: ABNORMAL (ref 1–2.5)
ALP BLD-CCNC: 112 U/L (ref 35–104)
ALT SERPL-CCNC: 26 U/L (ref 5–33)
AMORPHOUS: ABNORMAL
ANION GAP SERPL CALCULATED.3IONS-SCNC: 11 MMOL/L (ref 9–17)
AST SERPL-CCNC: 22 U/L
BACTERIA: ABNORMAL
BASOPHILS # BLD: 0 % (ref 0–2)
BASOPHILS ABSOLUTE: 0 K/UL (ref 0–0.2)
BILIRUB SERPL-MCNC: 0.4 MG/DL (ref 0.3–1.2)
BILIRUBIN URINE: NEGATIVE
BUN BLDV-MCNC: 31 MG/DL (ref 8–23)
BUN/CREAT BLD: 18 (ref 9–20)
CALCIUM SERPL-MCNC: 9.8 MG/DL (ref 8.6–10.4)
CASTS UA: ABNORMAL /LPF
CHLORIDE BLD-SCNC: 99 MMOL/L (ref 98–107)
CO2: 23 MMOL/L (ref 20–31)
COLOR: YELLOW
COMMENT UA: ABNORMAL
CREAT SERPL-MCNC: 1.77 MG/DL (ref 0.5–0.9)
CRYSTALS, UA: ABNORMAL /HPF
CULTURE: ABNORMAL
DIFFERENTIAL TYPE: YES
EOSINOPHILS RELATIVE PERCENT: 0 % (ref 0–5)
EPITHELIAL CELLS UA: ABNORMAL /HPF
GFR AFRICAN AMERICAN: 35 ML/MIN
GFR NON-AFRICAN AMERICAN: 29 ML/MIN
GFR SERPL CREATININE-BSD FRML MDRD: ABNORMAL ML/MIN/{1.73_M2}
GFR SERPL CREATININE-BSD FRML MDRD: ABNORMAL ML/MIN/{1.73_M2}
GLUCOSE BLD-MCNC: 158 MG/DL (ref 70–99)
GLUCOSE URINE: NEGATIVE
HCT VFR BLD CALC: 37.1 % (ref 36–46)
HEMOGLOBIN: 12.6 G/DL (ref 12–16)
IMMATURE GRANULOCYTES: ABNORMAL %
KETONES, URINE: NEGATIVE
LACTIC ACID, WHOLE BLOOD: NORMAL MMOL/L (ref 0.7–2.1)
LACTIC ACID: 1.3 MMOL/L (ref 0.5–2.2)
LEUKOCYTE ESTERASE, URINE: ABNORMAL
LYMPHOCYTES # BLD: 6 % (ref 15–40)
Lab: ABNORMAL
MCH RBC QN AUTO: 28.8 PG (ref 26–34)
MCHC RBC AUTO-ENTMCNC: 33.8 G/DL (ref 31–37)
MCV RBC AUTO: 85.2 FL (ref 80–100)
MONOCYTES # BLD: 4 % (ref 4–8)
MUCUS: ABNORMAL
NITRITE, URINE: NEGATIVE
NRBC AUTOMATED: ABNORMAL PER 100 WBC
OTHER OBSERVATIONS UA: ABNORMAL
PDW BLD-RTO: 16.4 % (ref 12.1–15.2)
PH UA: 6 (ref 5–8)
PLATELET # BLD: 207 K/UL (ref 140–450)
PLATELET ESTIMATE: ABNORMAL
PMV BLD AUTO: ABNORMAL FL (ref 6–12)
POTASSIUM SERPL-SCNC: 4.1 MMOL/L (ref 3.7–5.3)
PROTEIN UA: NEGATIVE
RBC # BLD: 4.36 M/UL (ref 4–5.2)
RBC # BLD: ABNORMAL 10*6/UL
RBC UA: ABNORMAL /HPF (ref 0–2)
RENAL EPITHELIAL, UA: ABNORMAL /HPF
SEG NEUTROPHILS: 90 % (ref 47–75)
SEGMENTED NEUTROPHILS ABSOLUTE COUNT: 9.3 K/UL (ref 2.5–7)
SODIUM BLD-SCNC: 133 MMOL/L (ref 135–144)
SPECIFIC GRAVITY UA: 1.01 (ref 1–1.03)
SPECIMEN DESCRIPTION: ABNORMAL
TOTAL PROTEIN: 6.8 G/DL (ref 6.4–8.3)
TRICHOMONAS: ABNORMAL
TURBIDITY: CLEAR
URINE HGB: NEGATIVE
UROBILINOGEN, URINE: NORMAL
WBC # BLD: 10.3 K/UL (ref 3.5–11)
WBC # BLD: ABNORMAL 10*3/UL
WBC UA: ABNORMAL /HPF
YEAST: ABNORMAL

## 2021-02-02 PROCEDURE — 71045 X-RAY EXAM CHEST 1 VIEW: CPT

## 2021-02-02 PROCEDURE — 80053 COMPREHEN METABOLIC PANEL: CPT

## 2021-02-02 PROCEDURE — 96365 THER/PROPH/DIAG IV INF INIT: CPT

## 2021-02-02 PROCEDURE — 99213 OFFICE O/P EST LOW 20 MIN: CPT | Performed by: NURSE PRACTITIONER

## 2021-02-02 PROCEDURE — 99283 EMERGENCY DEPT VISIT LOW MDM: CPT

## 2021-02-02 PROCEDURE — 36415 COLL VENOUS BLD VENIPUNCTURE: CPT

## 2021-02-02 PROCEDURE — 87086 URINE CULTURE/COLONY COUNT: CPT

## 2021-02-02 PROCEDURE — 2580000003 HC RX 258: Performed by: EMERGENCY MEDICINE

## 2021-02-02 PROCEDURE — 83605 ASSAY OF LACTIC ACID: CPT

## 2021-02-02 PROCEDURE — 87040 BLOOD CULTURE FOR BACTERIA: CPT

## 2021-02-02 PROCEDURE — 85025 COMPLETE CBC W/AUTO DIFF WBC: CPT

## 2021-02-02 PROCEDURE — 81001 URINALYSIS AUTO W/SCOPE: CPT

## 2021-02-02 PROCEDURE — 2500000003 HC RX 250 WO HCPCS: Performed by: EMERGENCY MEDICINE

## 2021-02-02 RX ORDER — CLINDAMYCIN HYDROCHLORIDE 300 MG/1
300 CAPSULE ORAL 3 TIMES DAILY
Qty: 30 CAPSULE | Refills: 0 | Status: ON HOLD | OUTPATIENT
Start: 2021-02-02 | End: 2021-02-10 | Stop reason: HOSPADM

## 2021-02-02 RX ORDER — CLINDAMYCIN PHOSPHATE 600 MG/50ML
600 INJECTION INTRAVENOUS ONCE
Status: COMPLETED | OUTPATIENT
Start: 2021-02-02 | End: 2021-02-02

## 2021-02-02 RX ORDER — 0.9 % SODIUM CHLORIDE 0.9 %
1000 INTRAVENOUS SOLUTION INTRAVENOUS ONCE
Status: COMPLETED | OUTPATIENT
Start: 2021-02-02 | End: 2021-02-02

## 2021-02-02 RX ADMIN — CLINDAMYCIN IN 5 PERCENT DEXTROSE 600 MG: 12 INJECTION, SOLUTION INTRAVENOUS at 13:18

## 2021-02-02 RX ADMIN — SODIUM CHLORIDE 1000 ML: 9 INJECTION, SOLUTION INTRAVENOUS at 11:26

## 2021-02-02 ASSESSMENT — ENCOUNTER SYMPTOMS
SHORTNESS OF BREATH: 0
DIARRHEA: 0
NAUSEA: 0
VOMITING: 0
COUGH: 0

## 2021-02-02 NOTE — PATIENT INSTRUCTIONS
SURVEY:    You may be receiving a survey from [employer] regarding your visit today. Please complete the survey to enable us to provide the highest quality of care to you and your family. If you cannot score us a very good (5 Stars) on any question, please call the office to discuss how we could have made your experience a very good one. Thank you.     Clinical Care Team: ASHLEY Adams-MARIAN Loaiza LPN    Clerical Team: Myriam Knapp

## 2021-02-02 NOTE — ED PROVIDER NOTES
EMERGENCY DEPARTMENT ENCOUNTER      CHIEF COMPLAINT    Chief Complaint   Patient presents with    Cellulitis     bilateral legs        HPI    Aminata Toney is a 61 y.o. female who presentsto ED from PCP office. By wheelchair. With complaint of right lower extremity edema, generalized weakness. Onset since yesterday. Patient was seen on Sunday 2 days ago for UTI symptoms. Intensity of symptoms moderate. Location of symptoms right lower extremity. Patient was seen emergency room on Sunday. Patient was started on ciprofloxacin. Patient noticed right lower extremity edema and redness that started yesterday. Patient is complaining of lower extremity pain and fatigue.     PAST MEDICAL HISTORY    Past Medical History:   Diagnosis Date    Acid reflux     Bleeding tendency (HCC)     Blood type O+     Bronchitis     Chronic back pain     Depression     Diverticulitis     DVT (deep venous thrombosis) ([facility]) 2012    Left leg    Fibromyalgia     Hypertension     Internal hemorrhoid     Left thyroid nodule 7/25/2013    Nausea & vomiting     Pulmonary embolism ([facility] [place] [address]        SURGICAL HISTORY    Past Surgical History:   Procedure Laterality Date    CHOLECYSTECTOMY  08/22/2013    laparoscopic    COLONOSCOPY      2012/ Dr. Marcy Padilla Right     TOTAL KNEE ARTHROPLASTY Left        CURRENT MEDICATIONS    Current Outpatient Rx   Medication Sig Dispense Refill    clindamycin (CLEOCIN) 300 MG capsule Take 1 capsule by mouth 3 times daily for 10 days 30 capsule 0    apixaban (ELIQUIS) 5 MG TABS tablet Take 1 tablet by mouth 2 times daily 180 tablet 3    omeprazole (PRILOSEC) 20 MG delayed release capsule Take 1 capsule by mouth daily 90 capsule 1    celecoxib (CELEBREX) 100 MG capsule Take 1 capsule by mouth 2 times daily 180 capsule 1    furosemide (LASIX) 20 MG tablet Take 1 tablet by mouth daily 30 tablet 11    rOPINIRole (REQUIP) 0.5 MG tablet Take 1 tablet by mouth 3 times daily 90 tablet 2    spironolactone (ALDACTONE) 25 MG tablet Take 25 mg by mouth 2 times daily      chlorthalidone (HYGROTON) 25 MG tablet Take 25 mg by mouth daily      amitriptyline (ELAVIL) 100 MG tablet Take 1 tablet by mouth nightly 90 tablet 1    flecainide (TAMBOCOR) 50 MG tablet Take 1 tablet by mouth 2 times daily 60 tablet 11    metoprolol succinate (TOPROL XL) 50 MG extended release tablet Take 1 tablet by mouth daily 90 tablet 3    dilTIAZem (CARDIZEM CD) 240 MG extended release capsule Take 1 capsule by mouth daily 90 capsule 3    Elastic Bandages & Supports (MEDICAL COMPRESSION STOCKINGS) MISC 1 each by Does not apply route daily Knee high 20mmHg 1 each 0       ALLERGIES    Allergies   Allergen Reactions    Ampicillin Rash    Sulfa Antibiotics Rash       FAMILY HISTORY    Family History   Problem Relation Age of Onset    Stroke Father     Diabetes Sister     Thyroid Disease Brother     Atrial Fibrillation Brother     Atrial Fibrillation Mother        SOCIAL HISTORY    Social History     Socioeconomic History    Marital status:      Spouse name: None    Number of children: None    Years of education: None    Highest education level: None   Occupational History    None   Social Needs    Financial resource strain: Not hard at all   [place] insecurity     Worry: Never true     Inability: Never true    Transportation needs     Medical: No     Non-medical: No   Tobacco Use    Smoking status: Never Smoker    Smokeless tobacco: Never Used   Substance and Sexual Activity    Alcohol use: No     Alcohol/week: 0.0 standard drinks     Comment: once in 10 years may have a glass wine    Drug use: No    Sexual activity: Yes     Partners: Male   Lifestyle    Physical activity     Days per week: None     Minutes per session: None    Stress: None   Relationships    Social connections     Talks on phone: None     Gets together: None     Attends Muslim service: None     Active member of club or organization: None     Attends meetings of clubs or organizations: None     Relationship status: None    Intimate partner violence     Fear of current or ex partner: None     Emotionally abused: None     Physically abused: None     Forced sexual activity: None   Other Topics Concern    None   Social History Narrative    None           Review of Systems:  Constitutional:  Denies fever, chills, weight loss or weakness   Eyes:  Denies photophobia or discharge   HENT:  Denies sore throat or ear pain   Respiratory:  Denies cough or shortness of breath   Cardiovascular:  Denies chest pain, palpitations or swelling   GI:  Denies abdominal pain, nausea, vomiting, or diarrhea   Musculoskeletal: Positive for R lower extremity pain  Skin:  Denies rash   Neurologic:  Denies headache, focal weakness or sensory changes   Endocrine:  Denies polyuria or polydypsia   Lymphatic:  Denies swollen glands   Psychiatric:  Denies depression, suicidal ideation or homicidal ideation   All systems negative except as marked. PHYSICAL EXAM    VITAL SIGNS: BP (!) 117/58   Pulse 78   Temp 97.9 °F (36.6 °C) (Oral)   Resp 20   Ht 5' 7\" (1.702 m)   Wt (!) 361 lb (163.7 kg)   SpO2 97%   BMI 56.54 kg/m²    Constitutional: 25-year-old female with high BMI stable vital signs and afebrile in ED. HENT:  Normocephalic, Atraumatic, Bilateral external ears normal, Oropharynx moist, No oral exudates, Nose normal. Neck- Normal range of motion, No tenderness, Supple, No stridor. Eyes:  PERRL, EOMI, Conjunctiva normal, No discharge. Respiratory:  Normal breath sounds, No respiratory distress, No wheezing, No chest tenderness. Cardiovascular:  Normal heart rate, Normal rhythm, No murmurs, No rubs, No gallops. GI:  Bowel sounds normal, Soft, No tenderness, No masses, No pulsatile masses. : External genitalia appear normal, No masses or lesions. No discharge. No CVA tenderness.    Musculoskeletal: Right lower extremity edema with redness and warmth to touch   integument: Right lower extremity edema with redness of the skin and warmth to touch  Lymphatic:  No lymphadenopathy noted. Neurologic:  Alert & oriented x 3, Normal motor function, Normal sensory function, No focal deficits noted. Psychiatric:  Affect normal, Judgment normal, Mood normal.     EKG        RADIOLOGY    XR CHEST PORTABLE   Final Result      Mild basilar atelectatic changes. No findings suspicious for infection or    failure. PROCEDURES        Labs  Labs Reviewed   CBC WITH AUTO DIFFERENTIAL - Abnormal; Notable for the following components:       Result Value    RDW 16.4 (*)     Seg Neutrophils 90 (*)     Lymphocytes 6 (*)     Segs Absolute 9.30 (*)     Absolute Lymph # 0.60 (*)     All other components within normal limits   COMPREHENSIVE METABOLIC PANEL - Abnormal; Notable for the following components:    Glucose 158 (*)     BUN 31 (*)     CREATININE 1.77 (*)     Sodium 133 (*)     Alkaline Phosphatase 112 (*)     Albumin 3.4 (*)     GFR Non- 29 (*)     GFR  35 (*)     All other components within normal limits   URINALYSIS - Abnormal; Notable for the following components:    Leukocyte Esterase, Urine 2+ (*)     All other components within normal limits   MICROSCOPIC URINALYSIS - Abnormal; Notable for the following components:    Bacteria, UA 2+ (*)     All other components within normal limits   CULTURE, BLOOD 1   CULTURE, URINE   LACTIC ACID, PLASMA             Summation      Patient Course: Patient has right lower extremity cellulitis. Patient is on ciprofloxacin for UTI. Patient is given the clindamycin 600 mg IV in ED. Patient is allergic to penicillin. I discussed hospitalization with the patient. Patient wants to go home and continue outpatient treatment. The warning signs were discussed. Return to ED if worse.     ED Medications administered this visit:    Medications   clindamycin (CLEOCIN) 600 mg in dextrose 5 % 50 mL IVPB (600 mg Intravenous New Bag 2/2/21 1318)   0.9 % sodium chloride bolus (0 mLs Intravenous Stopped 2/2/21 1318)       New Prescriptions from this visit:    New Prescriptions    CLINDAMYCIN (CLEOCIN) 300 MG CAPSULE    Take 1 capsule by mouth 3 times daily for 10 days       Follow-up:  ASHLEY Pope [facility]  [address]  [phone]    In 1 week  Return to ED if worse        Final Impression:   1. Cellulitis and abscess of right lower extremity    2.  Fatigue, unspecified type               (Please note that portions of this note were completed with a voice recognition program.  Efforts were made to edit the dictations but occasionally words are mis-transcribed.)        Nissa Garcia MD  02/02/21 7400

## 2021-02-02 NOTE — PROGRESS NOTES
HPI Notes    Name: Sai Gaxiola  : 1960         Chief Complaint:     Chief Complaint   Patient presents with    Leg Pain     Patient here today with right lower leg pain and swelling, feels tight. She is worried about blood clot    Fatigue     Patient complains of feeling weak, she was in the ED on 2021 for UTI. History of Present Illness:        HPI  Pt is a 60 yo female who reports for right lowe leg pain and swelling. Pain started about 2-3 days ago. Pt was in ED on 2021 and was dx with UTI. Pt states that she feels very fatigued and weak. Past Medical History:     Past Medical History:   Diagnosis Date    Acid reflux     Bleeding tendency (HCC)     Blood type O+     Bronchitis     Chronic back pain     Depression     Diverticulitis     DVT (deep venous thrombosis) ([facility] [address]     Left leg    Fibromyalgia     Hypertension     Internal hemorrhoid     Left thyroid nodule 2013    Nausea & vomiting     Pulmonary embolism ([facility] [address]       Reviewed all health maintenance requirements and ordered appropriate tests  Health Maintenance Due   Topic Date Due    DTaP/Tdap/Td vaccine (1 - Tdap) 04/15/1979    Shingles Vaccine (1 of 2) 04/15/2010    Cervical cancer screen  09/15/2017    Breast cancer screen  05/10/2018       Past Surgical History:     Past Surgical History:   Procedure Laterality Date    CHOLECYSTECTOMY  2013    laparoscopic    COLONOSCOPY      / Dr. Dilcia Fernandes Left         Medications:       Prior to Admission medications    Medication Sig Start Date End Date Taking?  Authorizing Provider   ciprofloxacin (CIPRO) 500 MG tablet Take 1 tablet by mouth 2 times daily for 7 days 21 Yes Kyler Osborne MD   apixaban (ELIQUIS) 5 MG TABS tablet Take 1 tablet by mouth 2 times daily 21  Yes ASHLEY Jaime - CNP   omeprazole (PRILOSEC) 20 MG delayed release capsule Take 1 capsule by mouth daily 1/25/21  Yes ASHLEY Daily CNP   celecoxib (CELEBREX) 100 MG capsule Take 1 capsule by mouth 2 times daily 1/25/21  Yes ASHLEY Daily CNP   furosemide (LASIX) 20 MG tablet Take 1 tablet by mouth daily 1/25/21  Yes Rory Jimenez MD   rOPINIRole (REQUIP) 0.5 MG tablet Take 1 tablet by mouth 3 times daily 1/21/21  Yes ASHLEY Daily CNP   spironolactone (ALDACTONE) 25 MG tablet Take 25 mg by mouth 2 times daily   Yes Historical Provider, MD   chlorthalidone (HYGROTON) 25 MG tablet Take 25 mg by mouth daily   Yes Historical Provider, MD   amitriptyline (ELAVIL) 100 MG tablet Take 1 tablet by mouth nightly 8/31/20  Yes ASHLEY Daily CNP   flecainide (TAMBOCOR) 50 MG tablet Take 1 tablet by mouth 2 times daily 7/27/20  Yes Rory Jimenez MD   metoprolol succinate (TOPROL XL) 50 MG extended release tablet Take 1 tablet by mouth daily 7/27/20  Yes Rory Jimenez MD   dilTIAZem (CARDIZEM CD) 240 MG extended release capsule Take 1 capsule by mouth daily 7/27/20 1/31/21  Rory Jimenez MD   Elastic Bandages & Supports ([facility]) [facility] 1 each by Does not apply route daily Knee high 20mmHg 1/3/19   ASHLEY Daily CNP        Allergies:       Ampicillin and Sulfa antibiotics    Social History:     Tobacco:    reports that she has never smoked. She has never used smokeless tobacco.  Alcohol:      reports no history of alcohol use. Drug Use:  reports no history of drug use. Family History:        Family History   Problem Relation Age of Onset    Stroke Father     Diabetes Sister     Thyroid Disease Brother     Atrial Fibrillation Brother     Atrial Fibrillation Mother        Review of Systems:         Review of Systems   Constitutional: Negative for chills and fever. Respiratory: Negative for cough and shortness of breath. Cardiovascular: Negative for chest pain and palpitations.    Gastrointestinal: Negative for diarrhea, nausea and vomiting. Neurological: Positive for weakness. Negative for dizziness, seizures and headaches. Physical Exam:     Vitals:  /70 (Site: Left Lower Arm, Position: Sitting, Cuff Size: Medium Adult)   Pulse 80   Temp 97.8 °F (36.6 °C) (Oral)   SpO2 98%       Physical Exam  Vitals signs and nursing note reviewed. Constitutional:       Appearance: Normal appearance. She is well-developed. Cardiovascular:      Rate and Rhythm: Normal rate and regular rhythm. Heart sounds: Normal heart sounds, S1 normal and S2 normal.   Pulmonary:      Effort: Pulmonary effort is normal. No respiratory distress. Breath sounds: Normal breath sounds. Abdominal:      General: Bowel sounds are normal.      Palpations: Abdomen is soft. Tenderness: There is no abdominal tenderness. There is no right CVA tenderness or left CVA tenderness. Skin:     General: Skin is warm and dry. Neurological:      Mental Status: She is alert and oriented to person, place, and time. Comments: Pt slightly lethargic during interview, falling partially asleep.                Data:     Lab Results   Component Value Date     02/02/2021    K 4.1 02/02/2021    CL 99 02/02/2021    CO2 23 02/02/2021    BUN 31 02/02/2021    CREATININE 1.77 02/02/2021    GLUCOSE 158 02/02/2021    PROT 6.8 02/02/2021    LABALBU 3.4 02/02/2021    BILITOT 0.40 02/02/2021    ALKPHOS 112 02/02/2021    AST 22 02/02/2021    ALT 26 02/02/2021     Lab Results   Component Value Date    WBC 10.3 02/02/2021    RBC 4.36 02/02/2021    RBC 4.17 05/15/2012    HGB 12.6 02/02/2021    HCT 37.1 02/02/2021    MCV 85.2 02/02/2021    MCH 28.8 02/02/2021    MCHC 33.8 02/02/2021    RDW 16.4 02/02/2021     02/02/2021     05/15/2012    MPV NOT REPORTED 02/02/2021     Lab Results   Component Value Date    TSH 3.06 08/28/2020     Lab Results   Component Value Date    CHOL 176 08/28/2020    CHOL 207 05/23/2019    HDL 46 08/28/2020 LABA1C 6.1 10/27/2020          Assessment & Plan        Diagnosis Orders   1. Acute renal failure superimposed on stage 3b chronic kidney disease, unspecified acute renal failure type (HCC)  Comprehensive Metabolic Panel    CBC Auto Differential    Lactic Acid, Plasma     Review of pt's chart reveals a tread down with a sudden drop in her GFR. BUN and Crt have steadily increased. Given pt's lethargy and overall illness, will take pt to ED for evaluation. Differential Dx:  Acute on chronic renal failure  Sepsis  Pyelonephritis    Oral report given to Dr Karen Paulino                  Completed Refills   Requested Prescriptions      No prescriptions requested or ordered in this encounter     No follow-ups on file. No orders of the defined types were placed in this encounter. Orders Placed This Encounter   Procedures    Comprehensive Metabolic Panel     Standing Status:   Future     Standing Expiration Date:   3/9/2022    CBC Auto Differential     Standing Status:   Future     Standing Expiration Date:   3/9/2022    Lactic Acid, Plasma     Standing Status:   Future     Standing Expiration Date:   2/2/2022         Patient Instructions   SURVEY:    You may be receiving a survey from [employer] regarding your visit today. Please complete the survey to enable us to provide the highest quality of care to you and your family. If you cannot score us a very good (5 Stars) on any question, please call the office to discuss how we could have made your experience a very good one. Thank you.     Clinical Care Team: EDWIN Smith LPN    Clerical Team: Kirk Hitchcock        Electronically signed by ASHLEY Smith CNP on 2/2/2021 at 11:58 AM           Completed Refills      Requested Prescriptions      No prescriptions requested or ordered in this encounter         [facility]

## 2021-02-03 LAB
CULTURE: NO GROWTH
Lab: NORMAL
SPECIMEN DESCRIPTION: NORMAL

## 2021-02-04 ENCOUNTER — PATIENT MESSAGE (OUTPATIENT)
Dept: FAMILY MEDICINE CLINIC | Age: 61
End: 2021-02-04

## 2021-02-04 DIAGNOSIS — I10 ESSENTIAL HYPERTENSION: ICD-10-CM

## 2021-02-05 ENCOUNTER — HOSPITAL ENCOUNTER (INPATIENT)
Age: 61
LOS: 5 days | Discharge: HOME | DRG: 603 | End: 2021-02-10
Attending: EMERGENCY MEDICINE | Admitting: INTERNAL MEDICINE
Payer: COMMERCIAL

## 2021-02-05 ENCOUNTER — APPOINTMENT (OUTPATIENT)
Dept: VASCULAR LAB | Age: 61
DRG: 603 | End: 2021-02-05
Payer: COMMERCIAL

## 2021-02-05 DIAGNOSIS — L03.115 CELLULITIS OF RIGHT LEG: Primary | ICD-10-CM

## 2021-02-05 LAB
ABSOLUTE EOS #: 0 K/UL (ref 0–0.4)
ABSOLUTE IMMATURE GRANULOCYTE: ABNORMAL K/UL (ref 0–0.3)
ABSOLUTE LYMPH #: 1.4 K/UL (ref 1–4.8)
ABSOLUTE MONO #: 0.7 K/UL (ref 0–1)
ALBUMIN SERPL-MCNC: 3.5 G/DL (ref 3.5–5.2)
ALBUMIN/GLOBULIN RATIO: ABNORMAL (ref 1–2.5)
ALP BLD-CCNC: 111 U/L (ref 35–104)
ALT SERPL-CCNC: 23 U/L (ref 5–33)
ANION GAP SERPL CALCULATED.3IONS-SCNC: 11 MMOL/L (ref 9–17)
AST SERPL-CCNC: 19 U/L
BASOPHILS # BLD: 0 % (ref 0–2)
BASOPHILS ABSOLUTE: 0 K/UL (ref 0–0.2)
BILIRUB SERPL-MCNC: 0.39 MG/DL (ref 0.3–1.2)
BUN BLDV-MCNC: 17 MG/DL (ref 8–23)
BUN/CREAT BLD: 13 (ref 9–20)
CALCIUM SERPL-MCNC: 9.6 MG/DL (ref 8.6–10.4)
CHLORIDE BLD-SCNC: 101 MMOL/L (ref 98–107)
CO2: 22 MMOL/L (ref 20–31)
CREAT SERPL-MCNC: 1.34 MG/DL (ref 0.5–0.9)
D-DIMER QUANTITATIVE: 1.06 MG/L FEU (ref 0–0.59)
DIFFERENTIAL TYPE: YES
EOSINOPHILS RELATIVE PERCENT: 0 % (ref 0–5)
GFR AFRICAN AMERICAN: 49 ML/MIN
GFR NON-AFRICAN AMERICAN: 40 ML/MIN
GFR SERPL CREATININE-BSD FRML MDRD: ABNORMAL ML/MIN/{1.73_M2}
GFR SERPL CREATININE-BSD FRML MDRD: ABNORMAL ML/MIN/{1.73_M2}
GLUCOSE BLD-MCNC: 119 MG/DL (ref 70–99)
HCT VFR BLD CALC: 37.5 % (ref 36–46)
HEMOGLOBIN: 12.5 G/DL (ref 12–16)
IMMATURE GRANULOCYTES: ABNORMAL %
LACTIC ACID, WHOLE BLOOD: NORMAL MMOL/L (ref 0.7–2.1)
LACTIC ACID: 0.8 MMOL/L (ref 0.5–2.2)
LYMPHOCYTES # BLD: 18 % (ref 15–40)
MCH RBC QN AUTO: 28.1 PG (ref 26–34)
MCHC RBC AUTO-ENTMCNC: 33.3 G/DL (ref 31–37)
MCV RBC AUTO: 84.6 FL (ref 80–100)
MONOCYTES # BLD: 10 % (ref 4–8)
NRBC AUTOMATED: ABNORMAL PER 100 WBC
PDW BLD-RTO: 16.1 % (ref 12.1–15.2)
PLATELET # BLD: 302 K/UL (ref 140–450)
PLATELET ESTIMATE: ABNORMAL
PMV BLD AUTO: ABNORMAL FL (ref 6–12)
POTASSIUM SERPL-SCNC: 4.3 MMOL/L (ref 3.7–5.3)
RBC # BLD: 4.43 M/UL (ref 4–5.2)
RBC # BLD: ABNORMAL 10*6/UL
SARS-COV-2, RAPID: NOT DETECTED
SARS-COV-2: NORMAL
SARS-COV-2: NORMAL
SEG NEUTROPHILS: 72 % (ref 47–75)
SEGMENTED NEUTROPHILS ABSOLUTE COUNT: 5.6 K/UL (ref 2.5–7)
SODIUM BLD-SCNC: 134 MMOL/L (ref 135–144)
SOURCE: NORMAL
TOTAL PROTEIN: 6.9 G/DL (ref 6.4–8.3)
WBC # BLD: 7.9 K/UL (ref 3.5–11)
WBC # BLD: ABNORMAL 10*3/UL

## 2021-02-05 PROCEDURE — 6360000002 HC RX W HCPCS: Performed by: EMERGENCY MEDICINE

## 2021-02-05 PROCEDURE — 85025 COMPLETE CBC W/AUTO DIFF WBC: CPT

## 2021-02-05 PROCEDURE — 83605 ASSAY OF LACTIC ACID: CPT

## 2021-02-05 PROCEDURE — U0002 COVID-19 LAB TEST NON-CDC: HCPCS

## 2021-02-05 PROCEDURE — 80053 COMPREHEN METABOLIC PANEL: CPT

## 2021-02-05 PROCEDURE — 2580000003 HC RX 258: Performed by: INTERNAL MEDICINE

## 2021-02-05 PROCEDURE — 2580000003 HC RX 258: Performed by: EMERGENCY MEDICINE

## 2021-02-05 PROCEDURE — 6360000002 HC RX W HCPCS: Performed by: INTERNAL MEDICINE

## 2021-02-05 PROCEDURE — 93971 EXTREMITY STUDY: CPT

## 2021-02-05 PROCEDURE — 6370000000 HC RX 637 (ALT 250 FOR IP): Performed by: INTERNAL MEDICINE

## 2021-02-05 PROCEDURE — 99284 EMERGENCY DEPT VISIT MOD MDM: CPT

## 2021-02-05 PROCEDURE — 85379 FIBRIN DEGRADATION QUANT: CPT

## 2021-02-05 PROCEDURE — C9803 HOPD COVID-19 SPEC COLLECT: HCPCS

## 2021-02-05 PROCEDURE — 1200000000 HC SEMI PRIVATE

## 2021-02-05 PROCEDURE — 36415 COLL VENOUS BLD VENIPUNCTURE: CPT

## 2021-02-05 RX ORDER — AMITRIPTYLINE HYDROCHLORIDE 25 MG/1
100 TABLET, FILM COATED ORAL NIGHTLY
Status: DISCONTINUED | OUTPATIENT
Start: 2021-02-05 | End: 2021-02-10 | Stop reason: HOSPADM

## 2021-02-05 RX ORDER — DILTIAZEM HYDROCHLORIDE 120 MG/1
240 CAPSULE, COATED, EXTENDED RELEASE ORAL DAILY
Status: DISCONTINUED | OUTPATIENT
Start: 2021-02-06 | End: 2021-02-09

## 2021-02-05 RX ORDER — ROPINIROLE 0.5 MG/1
0.5 TABLET, FILM COATED ORAL 3 TIMES DAILY
Status: DISCONTINUED | OUTPATIENT
Start: 2021-02-05 | End: 2021-02-10 | Stop reason: HOSPADM

## 2021-02-05 RX ORDER — PANTOPRAZOLE SODIUM 40 MG/1
40 TABLET, DELAYED RELEASE ORAL
Status: DISCONTINUED | OUTPATIENT
Start: 2021-02-06 | End: 2021-02-10 | Stop reason: HOSPADM

## 2021-02-05 RX ORDER — METOPROLOL SUCCINATE 50 MG/1
50 TABLET, EXTENDED RELEASE ORAL DAILY
Status: DISCONTINUED | OUTPATIENT
Start: 2021-02-06 | End: 2021-02-10 | Stop reason: HOSPADM

## 2021-02-05 RX ORDER — CHLORTHALIDONE 25 MG/1
25 TABLET ORAL DAILY
Status: DISCONTINUED | OUTPATIENT
Start: 2021-02-06 | End: 2021-02-07

## 2021-02-05 RX ORDER — ACETAMINOPHEN 325 MG/1
650 TABLET ORAL EVERY 6 HOURS PRN
Status: DISCONTINUED | OUTPATIENT
Start: 2021-02-05 | End: 2021-02-10 | Stop reason: HOSPADM

## 2021-02-05 RX ORDER — PROMETHAZINE HYDROCHLORIDE 25 MG/1
12.5 TABLET ORAL EVERY 6 HOURS PRN
Status: DISCONTINUED | OUTPATIENT
Start: 2021-02-05 | End: 2021-02-10 | Stop reason: HOSPADM

## 2021-02-05 RX ORDER — FLECAINIDE ACETATE 100 MG/1
50 TABLET ORAL 2 TIMES DAILY
Status: DISCONTINUED | OUTPATIENT
Start: 2021-02-05 | End: 2021-02-08

## 2021-02-05 RX ORDER — ONDANSETRON 2 MG/ML
4 INJECTION INTRAMUSCULAR; INTRAVENOUS EVERY 6 HOURS PRN
Status: DISCONTINUED | OUTPATIENT
Start: 2021-02-05 | End: 2021-02-10 | Stop reason: HOSPADM

## 2021-02-05 RX ORDER — SPIRONOLACTONE 25 MG/1
25 TABLET ORAL 2 TIMES DAILY
Status: DISCONTINUED | OUTPATIENT
Start: 2021-02-05 | End: 2021-02-10 | Stop reason: HOSPADM

## 2021-02-05 RX ORDER — SODIUM CHLORIDE 0.9 % (FLUSH) 0.9 %
10 SYRINGE (ML) INJECTION PRN
Status: DISCONTINUED | OUTPATIENT
Start: 2021-02-05 | End: 2021-02-10 | Stop reason: HOSPADM

## 2021-02-05 RX ORDER — ACETAMINOPHEN 650 MG/1
650 SUPPOSITORY RECTAL EVERY 6 HOURS PRN
Status: DISCONTINUED | OUTPATIENT
Start: 2021-02-05 | End: 2021-02-10 | Stop reason: HOSPADM

## 2021-02-05 RX ORDER — SODIUM CHLORIDE 0.9 % (FLUSH) 0.9 %
10 SYRINGE (ML) INJECTION EVERY 12 HOURS SCHEDULED
Status: DISCONTINUED | OUTPATIENT
Start: 2021-02-05 | End: 2021-02-10 | Stop reason: HOSPADM

## 2021-02-05 RX ORDER — FUROSEMIDE 20 MG/1
20 TABLET ORAL DAILY
Status: DISCONTINUED | OUTPATIENT
Start: 2021-02-06 | End: 2021-02-10 | Stop reason: HOSPADM

## 2021-02-05 RX ORDER — POLYETHYLENE GLYCOL 3350 17 G/17G
17 POWDER, FOR SOLUTION ORAL DAILY PRN
Status: DISCONTINUED | OUTPATIENT
Start: 2021-02-05 | End: 2021-02-10 | Stop reason: HOSPADM

## 2021-02-05 RX ADMIN — ACETAMINOPHEN 650 MG: 325 TABLET, FILM COATED ORAL at 20:24

## 2021-02-05 RX ADMIN — PIPERACILLIN SODIUM AND TAZOBACTAM SODIUM 3375 MG: 3; .375 INJECTION, POWDER, LYOPHILIZED, FOR SOLUTION INTRAVENOUS at 13:58

## 2021-02-05 RX ADMIN — AMITRIPTYLINE HYDROCHLORIDE 100 MG: 25 TABLET, FILM COATED ORAL at 20:24

## 2021-02-05 RX ADMIN — ROPINIROLE 0.5 MG: 0.5 TABLET, FILM COATED ORAL at 20:25

## 2021-02-05 RX ADMIN — SPIRONOLACTONE 25 MG: 25 TABLET, FILM COATED ORAL at 20:25

## 2021-02-05 RX ADMIN — APIXABAN 5 MG: 5 TABLET, FILM COATED ORAL at 20:25

## 2021-02-05 RX ADMIN — SODIUM CHLORIDE, PRESERVATIVE FREE 10 ML: 5 INJECTION INTRAVENOUS at 20:24

## 2021-02-05 RX ADMIN — VANCOMYCIN HYDROCHLORIDE 1500 MG: 500 INJECTION, POWDER, LYOPHILIZED, FOR SOLUTION INTRAVENOUS at 16:33

## 2021-02-05 RX ADMIN — FLECAINIDE ACETATE 50 MG: 100 TABLET ORAL at 20:26

## 2021-02-05 RX ADMIN — PIPERACILLIN SODIUM AND TAZOBACTAM SODIUM 3375 MG: 3; .375 INJECTION, POWDER, LYOPHILIZED, FOR SOLUTION INTRAVENOUS at 20:23

## 2021-02-05 ASSESSMENT — PAIN SCALES - GENERAL: PAINLEVEL_OUTOF10: 0

## 2021-02-05 ASSESSMENT — PAIN DESCRIPTION - LOCATION: LOCATION: LEG

## 2021-02-05 ASSESSMENT — PAIN DESCRIPTION - PAIN TYPE: TYPE: ACUTE PAIN

## 2021-02-05 ASSESSMENT — PAIN DESCRIPTION - FREQUENCY: FREQUENCY: CONTINUOUS

## 2021-02-05 ASSESSMENT — PAIN DESCRIPTION - DESCRIPTORS: DESCRIPTORS: ACHING;CONSTANT

## 2021-02-05 ASSESSMENT — PAIN DESCRIPTION - ORIENTATION: ORIENTATION: RIGHT

## 2021-02-05 NOTE — CONSULTS
[facility]      Pharmacy Note  Vancomycin Consult    Jourdan Allred is a 61 y.o. female ordered Vancomycin for cellulitis of right leg; consult received from Dr. David Farooq to manage therapy. Also receiving Zosyn. Patient Active Problem List   Diagnosis    Fibromyalgia    Follicular adenoma of thyroid gland    S/P laparoscopic cholecystectomy    Right hip pain    Bilateral leg edema    Chronic fatigue    Left thyroid nodule    Chronic back pain greater than 3 months duration    Shortness of breath    Pulmonary hypertension associated with unclear multi-factorial mechanisms ([address]    Morbid obesity due to excess calories (HCC)    MAYURI (obstructive sleep apnea)    Essential hypertension    Coxitis    History of total hip replacement    Gastroesophageal reflux disease without esophagitis    Polyarthritis    Mixed incontinence    Constipation    Acute cystitis without hematuria    RLS (restless legs syndrome)    Cellulitis of leg, right       Allergies:  Ampicillin and Sulfa antibiotics     Temp max: 98.1    Recent Labs     02/05/21  1049   WBC 7.9       Recent Labs     02/05/21  1049   BUN 17   CREATININE 1.34*       No intake or output data in the 24 hours ending 02/05/21 1554    Culture Date      Source          Results  1/31                    Urine              Klebsiella Pneumonia  2/2                      Blood              No growth 3 days    Ht Readings from Last 1 Encounters:   02/05/21 5' 7\" (1.702 m)        Wt Readings from Last 1 Encounters:   02/05/21 (!) 363 lb 11.2 oz (165 kg)         Estimated Creatinine Clearance: 73 mL/min (A) (based on SCr of 1.34 mg/dL (H)). GOAL TROUGH: 10-20    Initial Trough Ordered:    Date: 2/7   Time: 0400    Assessment/Plan:  Will initiate vancomycin 1500 mg IV every 12 hours. BUN/Cr will be ordered q72hrs while on vancomycin. Thank you for the consult. Will continue to follow.   Jenny Damon, PharmD 2/5/2021 4:01 PM

## 2021-02-05 NOTE — ED NOTES
Right lower leg swollen, painful to walk, red, and right pedal pulses present.       Komal Hester RN  02/05/21 1017

## 2021-02-05 NOTE — PROGRESS NOTES
Pt arrives to room 271 per wheelchair. Pt ambulates to bed without difficulty. Pt's right lower leg is reddened and edematous as well as warm to touch. Redness is outlined with marker from ER. Pedal and post tib pulses are present with doppler and marked. Admission completed and pt educated on use of bed, call light and plan of care. Pt verbalized understanding.

## 2021-02-05 NOTE — PLAN OF CARE
Problem:  Body Temperature - Imbalanced:  Goal: Ability to maintain a body temperature in the normal range will improve  Description: Ability to maintain a body temperature in the normal range will improve  Outcome: Ongoing     Problem: Mobility - Impaired:  Goal: Mobility will improve to maximum level  Description: Mobility will improve to maximum level  Outcome: Ongoing     Problem: Pain:  Goal: Pain level will decrease  Description: Pain level will decrease  Outcome: Ongoing     Problem: Skin Integrity - Impaired:  Goal: Will show no infection signs and symptoms  Description: Will show no infection signs and symptoms  Outcome: Ongoing     Problem: Infection:  Goal: Will remain free from infection  Description: Will remain free from infection  Outcome: Ongoing     Problem: Daily Care:  Goal: Daily care needs are met  Description: Daily care needs are met  Outcome: Ongoing     Problem: Skin Integrity:  Goal: Skin integrity will stabilize  Description: Skin integrity will stabilize  Outcome: Ongoing

## 2021-02-05 NOTE — TELEPHONE ENCOUNTER
From: Clifton Rico  To: ASHLEY Burroughs CNP  Sent: 2/4/2021 7:02 PM EST  Subject: Prescription Question    How long should I give the clindamycin before I notice any improvement?

## 2021-02-05 NOTE — PROGRESS NOTES
ACE wraps applied to bilateral lower legs and then they were elevated. Pt denies any complaints at this time. Pt states that she uses CPAP at home but only intermittently, pt was asked to have her  bring in her machine. She states that he will not be able to do it until tomorrow.

## 2021-02-05 NOTE — ED PROVIDER NOTES
[facility] [address] COMPLAINT    Chief Complaint   Patient presents with    Cellulitis     \"been to ED on Sunday for UTI, Joshua Malagon sent me to ED on Tuesday for cellulits of right leg. They wanted to admit me then but I thought I would try antibiotics at home. The leg has not gotten any better. \"       HPI    Jomar Brar is a 61 y.o. female who presentsto ED from home. By car. With complaint of right lower extremity swelling getting worse. Onset 3 days ago. Patient was seen 3 days ago with redness and edema of the right lower extremity. Patient was taking ciprofloxacin for UTI and that was switched to clindamycin. Patient presents today with worsening edema and redness of the right lower extremity. Patient has history of DVTs. Patient is on Eliquis. Patient denies shortness of breath more than usual denies fever. Patient denies injury to her lower extremity.     PAST MEDICAL HISTORY    Past Medical History:   Diagnosis Date    Acid reflux     Bleeding tendency ([facility])     Blood type O+     Bronchitis     Chronic back pain     Depression     Diverticulitis     DVT (deep venous thrombosis) ([facility]) 2012    Left leg    Fibromyalgia     Hypertension     Internal hemorrhoid     Left thyroid nodule 7/25/2013    Nausea & vomiting     Pulmonary embolism ([facility] [address]        SURGICAL HISTORY    Past Surgical History:   Procedure Laterality Date    CHOLECYSTECTOMY  08/22/2013    laparoscopic    COLONOSCOPY      2012/ Dr. Rashid Castro Right     TOTAL KNEE ARTHROPLASTY Left        CURRENT MEDICATIONS    Current Outpatient Rx   Medication Sig Dispense Refill    clindamycin (CLEOCIN) 300 MG capsule Take 1 capsule by mouth 3 times daily for 10 days 30 capsule 0    apixaban (ELIQUIS) 5 MG TABS tablet Take 1 tablet by mouth 2 times daily 180 tablet 3    omeprazole (PRILOSEC) 20 MG delayed release capsule Take 1 capsule by mouth daily 90 capsule 1    celecoxib (CELEBREX) 100 MG capsule Take 1 capsule by mouth 2 times daily 180 capsule 1    furosemide (LASIX) 20 MG tablet Take 1 tablet by mouth daily 30 tablet 11    rOPINIRole (REQUIP) 0.5 MG tablet Take 1 tablet by mouth 3 times daily 90 tablet 2    spironolactone (ALDACTONE) 25 MG tablet Take 25 mg by mouth 2 times daily      chlorthalidone (HYGROTON) 25 MG tablet Take 25 mg by mouth daily      amitriptyline (ELAVIL) 100 MG tablet Take 1 tablet by mouth nightly 90 tablet 1    flecainide (TAMBOCOR) 50 MG tablet Take 1 tablet by mouth 2 times daily 60 tablet 11    metoprolol succinate (TOPROL XL) 50 MG extended release tablet Take 1 tablet by mouth daily 90 tablet 3    dilTIAZem (CARDIZEM CD) 240 MG extended release capsule Take 1 capsule by mouth daily 90 capsule 3    Elastic Bandages & Supports (MEDICAL COMPRESSION STOCKINGS) MISC 1 each by Does not apply route daily Knee high 20mmHg 1 each 0       ALLERGIES    Allergies   Allergen Reactions    Ampicillin Rash    Sulfa Antibiotics Rash       FAMILY HISTORY    Family History   Problem Relation Age of Onset    Stroke Father     Diabetes Sister     Thyroid Disease Brother     Atrial Fibrillation Brother     Atrial Fibrillation Mother        SOCIAL HISTORY    Social History     Socioeconomic History    Marital status:      Spouse name: Not on file    Number of children: Not on file    Years of education: Not on file    Highest education level: Not on file   Occupational History    Not on file   Social Needs    Financial resource strain: Not hard at all   [place] insecurity     Worry: Never true     Inability: Never true   [employer] needs     Medical: No     Non-medical: No   Tobacco Use    Smoking status: Never Smoker    Smokeless tobacco: Never Used   Substance and Sexual Activity    Alcohol use: No     Alcohol/week: 0.0 standard drinks     Comment: once in 10 years may have a glass wine    Drug use: No    Sexual activity: Yes Partners: Male   Lifestyle    Physical activity     Days per week: Not on file     Minutes per session: Not on file    Stress: Not on file   Relationships    Social connections     Talks on phone: Not on file     Gets together: Not on file     Attends Evangelical service: Not on file     Active member of club or organization: Not on file     Attends meetings of clubs or organizations: Not on file     Relationship status: Not on file    Intimate partner violence     Fear of current or ex partner: Not on file     Emotionally abused: Not on file     Physically abused: Not on file     Forced sexual activity: Not on file   Other Topics Concern    Not on file   Social History Narrative    Not on file           Review of Systems:  Constitutional:  Denies fever, chills, weight loss or weakness   Eyes:  Denies photophobia or discharge   HENT:  Denies sore throat or ear pain   Respiratory:  Denies cough or shortness of breath   Cardiovascular:  Denies chest pain, palpitations or swelling   GI:  Denies abdominal pain, nausea, vomiting, or diarrhea   Musculoskeletal: Right lower extremity edema and swelling skin:  Denies rash   Neurologic:  Denies headache, focal weakness or sensory changes   Endocrine:  Denies polyuria or polydypsia   Lymphatic:  Denies swollen glands   Psychiatric:  Denies depression, suicidal ideation or homicidal ideation   All systems negative except as marked. PHYSICAL EXAM    VITAL SIGNS: BP (!) 144/83   Pulse 72   Temp 98.1 °F (36.7 °C) (Oral)   Resp 18   Ht 5' 7\" (1.702 m)   Wt (!) 360 lb (163.3 kg)   SpO2 98%   BMI 56.38 kg/m²    Constitutional: 70-year-old female with high BMI. Patient is afebrile in no acute distress on arrival.  HENT:  Normocephalic, Atraumatic, Bilateral external ears normal, Oropharynx moist, No oral exudates, Nose normal. Neck- Normal range of motion, No tenderness, Supple, No stridor. Eyes:  PERRL, EOMI, Conjunctiva normal, No discharge.    Respiratory:  Normal breath sounds, No respiratory distress, No wheezing, No chest tenderness. Cardiovascular:  Normal heart rate, Normal rhythm, No murmurs, No rubs, No gallops. GI:  Bowel sounds normal, Soft, No tenderness, No masses, No pulsatile masses. : External genitalia appear normal, No masses or lesions. No discharge. No CVA tenderness. Musculoskeletal: Right lower extremity edema, redness warmth to touch. The edema and redness spreads to the thigh. Integument: Right lower extremity cellulitis  Lymphatic:  No lymphadenopathy noted. Neurologic:  Alert & oriented x 3, Normal motor function, Normal sensory function, No focal deficits noted. Psychiatric:  Affect normal, Judgment normal, Mood normal.     EKG        RADIOLOGY    VL DUP LOWER EXTREMITY VENOUS RIGHT   Final Result          PROCEDURES        Labs  Labs Reviewed   CBC WITH AUTO DIFFERENTIAL - Abnormal; Notable for the following components:       Result Value    RDW 16.1 (*)     Monocytes 10 (*)     All other components within normal limits   COMPREHENSIVE METABOLIC PANEL - Abnormal; Notable for the following components:    Glucose 119 (*)     CREATININE 1.34 (*)     Sodium 134 (*)     Alkaline Phosphatase 111 (*)     GFR Non- 40 (*)     GFR  49 (*)     All other components within normal limits   D-DIMER, QUANTITATIVE - Abnormal; Notable for the following components:    D-Dimer, Quant 1.06 (*)     All other components within normal limits   LACTIC ACID, PLASMA   COVID-19             Summation      Patient Course: D-dimer is slightly elevated. Doppler of the lower extremity with no evidence of DVT. Patient is discussed with Misty Lockhart. Patient failed outpatient therapy patient will be admitted for IV antibiotics. ED Medications administered this visit:  Medications - No data to display    New Prescriptions from this visit:    New Prescriptions    No medications on file       Follow-up:  No follow-up provider specified. Final Impression:   1.  Cellulitis of right leg               (Please note that portions of this note were completed with a voice recognition program.  Efforts were made to edit the dictations but occasionally words are mis-transcribed.)          Hannah Covarrubias MD  02/05/21 0868

## 2021-02-05 NOTE — ED NOTES
Pt right leg cellulitis outline marked with surgical pen. Denies any other needs at this time.       Jovon Fowler RN  02/05/21 0121

## 2021-02-06 LAB
ABSOLUTE EOS #: 0 K/UL (ref 0–0.4)
ABSOLUTE IMMATURE GRANULOCYTE: ABNORMAL K/UL (ref 0–0.3)
ABSOLUTE LYMPH #: 1.9 K/UL (ref 1–4.8)
ABSOLUTE MONO #: 0.7 K/UL (ref 0–1)
ANION GAP SERPL CALCULATED.3IONS-SCNC: 8 MMOL/L (ref 9–17)
BASOPHILS # BLD: 0 % (ref 0–2)
BASOPHILS ABSOLUTE: 0 K/UL (ref 0–0.2)
BUN BLDV-MCNC: 15 MG/DL (ref 8–23)
BUN/CREAT BLD: 12 (ref 9–20)
CALCIUM SERPL-MCNC: 9.6 MG/DL (ref 8.6–10.4)
CHLORIDE BLD-SCNC: 103 MMOL/L (ref 98–107)
CO2: 27 MMOL/L (ref 20–31)
CREAT SERPL-MCNC: 1.26 MG/DL (ref 0.5–0.9)
DIFFERENTIAL TYPE: YES
EOSINOPHILS RELATIVE PERCENT: 0 % (ref 0–5)
GFR AFRICAN AMERICAN: 53 ML/MIN
GFR NON-AFRICAN AMERICAN: 43 ML/MIN
GFR SERPL CREATININE-BSD FRML MDRD: ABNORMAL ML/MIN/{1.73_M2}
GFR SERPL CREATININE-BSD FRML MDRD: ABNORMAL ML/MIN/{1.73_M2}
GLUCOSE BLD-MCNC: 145 MG/DL (ref 70–99)
HCT VFR BLD CALC: 34.1 % (ref 36–46)
HEMOGLOBIN: 11.4 G/DL (ref 12–16)
IMMATURE GRANULOCYTES: ABNORMAL %
LYMPHOCYTES # BLD: 24 % (ref 15–40)
MCH RBC QN AUTO: 28.6 PG (ref 26–34)
MCHC RBC AUTO-ENTMCNC: 33.5 G/DL (ref 31–37)
MCV RBC AUTO: 85.4 FL (ref 80–100)
MONOCYTES # BLD: 9 % (ref 4–8)
NRBC AUTOMATED: ABNORMAL PER 100 WBC
PDW BLD-RTO: 16.3 % (ref 12.1–15.2)
PLATELET # BLD: 307 K/UL (ref 140–450)
PLATELET ESTIMATE: ABNORMAL
PMV BLD AUTO: ABNORMAL FL (ref 6–12)
POTASSIUM SERPL-SCNC: 4.4 MMOL/L (ref 3.7–5.3)
RBC # BLD: 3.99 M/UL (ref 4–5.2)
RBC # BLD: ABNORMAL 10*6/UL
SEG NEUTROPHILS: 67 % (ref 47–75)
SEGMENTED NEUTROPHILS ABSOLUTE COUNT: 5.3 K/UL (ref 2.5–7)
SODIUM BLD-SCNC: 138 MMOL/L (ref 135–144)
WBC # BLD: 8 K/UL (ref 3.5–11)
WBC # BLD: ABNORMAL 10*3/UL

## 2021-02-06 PROCEDURE — 6360000002 HC RX W HCPCS: Performed by: INTERNAL MEDICINE

## 2021-02-06 PROCEDURE — 2580000003 HC RX 258: Performed by: INTERNAL MEDICINE

## 2021-02-06 PROCEDURE — 94761 N-INVAS EAR/PLS OXIMETRY MLT: CPT

## 2021-02-06 PROCEDURE — 80048 BASIC METABOLIC PNL TOTAL CA: CPT

## 2021-02-06 PROCEDURE — 6370000000 HC RX 637 (ALT 250 FOR IP): Performed by: INTERNAL MEDICINE

## 2021-02-06 PROCEDURE — 1200000000 HC SEMI PRIVATE

## 2021-02-06 PROCEDURE — 2580000003 HC RX 258

## 2021-02-06 PROCEDURE — 36415 COLL VENOUS BLD VENIPUNCTURE: CPT

## 2021-02-06 PROCEDURE — 85025 COMPLETE CBC W/AUTO DIFF WBC: CPT

## 2021-02-06 RX ORDER — VANCOMYCIN HYDROCHLORIDE 1 G/20ML
INJECTION, POWDER, LYOPHILIZED, FOR SOLUTION INTRAVENOUS
Status: DISPENSED
Start: 2021-02-06 | End: 2021-02-07

## 2021-02-06 RX ORDER — VANCOMYCIN HYDROCHLORIDE 500 MG/10ML
INJECTION, POWDER, LYOPHILIZED, FOR SOLUTION INTRAVENOUS
Status: DISPENSED
Start: 2021-02-06 | End: 2021-02-07

## 2021-02-06 RX ORDER — SPIRONOLACTONE 25 MG/1
25 TABLET ORAL 2 TIMES DAILY
COMMUNITY
End: 2021-10-25 | Stop reason: SDUPTHER

## 2021-02-06 RX ADMIN — VANCOMYCIN HYDROCHLORIDE 1500 MG: 500 INJECTION, POWDER, LYOPHILIZED, FOR SOLUTION INTRAVENOUS at 03:36

## 2021-02-06 RX ADMIN — PANTOPRAZOLE SODIUM 40 MG: 40 TABLET, DELAYED RELEASE ORAL at 05:38

## 2021-02-06 RX ADMIN — SODIUM CHLORIDE, PRESERVATIVE FREE 10 ML: 5 INJECTION INTRAVENOUS at 08:09

## 2021-02-06 RX ADMIN — SPIRONOLACTONE 25 MG: 25 TABLET, FILM COATED ORAL at 08:09

## 2021-02-06 RX ADMIN — PIPERACILLIN SODIUM AND TAZOBACTAM SODIUM 3375 MG: 3; .375 INJECTION, POWDER, LYOPHILIZED, FOR SOLUTION INTRAVENOUS at 22:23

## 2021-02-06 RX ADMIN — ROPINIROLE 0.5 MG: 0.5 TABLET, FILM COATED ORAL at 20:47

## 2021-02-06 RX ADMIN — SODIUM CHLORIDE, PRESERVATIVE FREE 10 ML: 5 INJECTION INTRAVENOUS at 20:47

## 2021-02-06 RX ADMIN — ROPINIROLE 0.5 MG: 0.5 TABLET, FILM COATED ORAL at 08:08

## 2021-02-06 RX ADMIN — PIPERACILLIN SODIUM AND TAZOBACTAM SODIUM 3375 MG: 3; .375 INJECTION, POWDER, LYOPHILIZED, FOR SOLUTION INTRAVENOUS at 06:10

## 2021-02-06 RX ADMIN — SPIRONOLACTONE 25 MG: 25 TABLET, FILM COATED ORAL at 20:47

## 2021-02-06 RX ADMIN — VANCOMYCIN HYDROCHLORIDE 1500 MG: 500 INJECTION, POWDER, LYOPHILIZED, FOR SOLUTION INTRAVENOUS at 18:01

## 2021-02-06 RX ADMIN — CHLORTHALIDONE 25 MG: 25 TABLET ORAL at 08:09

## 2021-02-06 RX ADMIN — FUROSEMIDE 20 MG: 20 TABLET ORAL at 08:09

## 2021-02-06 RX ADMIN — PIPERACILLIN SODIUM AND TAZOBACTAM SODIUM 3375 MG: 3; .375 INJECTION, POWDER, LYOPHILIZED, FOR SOLUTION INTRAVENOUS at 13:32

## 2021-02-06 RX ADMIN — APIXABAN 5 MG: 5 TABLET, FILM COATED ORAL at 08:08

## 2021-02-06 RX ADMIN — FLECAINIDE ACETATE 50 MG: 100 TABLET ORAL at 20:47

## 2021-02-06 RX ADMIN — APIXABAN 5 MG: 5 TABLET, FILM COATED ORAL at 20:47

## 2021-02-06 RX ADMIN — ACETAMINOPHEN 650 MG: 325 TABLET, FILM COATED ORAL at 16:37

## 2021-02-06 RX ADMIN — FLECAINIDE ACETATE 50 MG: 100 TABLET ORAL at 08:08

## 2021-02-06 RX ADMIN — ROPINIROLE 0.5 MG: 0.5 TABLET, FILM COATED ORAL at 13:32

## 2021-02-06 RX ADMIN — AMITRIPTYLINE HYDROCHLORIDE 100 MG: 25 TABLET, FILM COATED ORAL at 20:47

## 2021-02-06 RX ADMIN — DILTIAZEM HYDROCHLORIDE 240 MG: 120 CAPSULE, COATED, EXTENDED RELEASE ORAL at 08:09

## 2021-02-06 RX ADMIN — WATER 10 ML: 1 INJECTION INTRAMUSCULAR; INTRAVENOUS; SUBCUTANEOUS at 18:01

## 2021-02-06 RX ADMIN — METOPROLOL SUCCINATE 50 MG: 50 TABLET, EXTENDED RELEASE ORAL at 08:09

## 2021-02-06 ASSESSMENT — PAIN SCALES - GENERAL
PAINLEVEL_OUTOF10: 0
PAINLEVEL_OUTOF10: 0
PAINLEVEL_OUTOF10: 1

## 2021-02-06 ASSESSMENT — PAIN DESCRIPTION - PROGRESSION
CLINICAL_PROGRESSION: GRADUALLY IMPROVING
CLINICAL_PROGRESSION: GRADUALLY IMPROVING

## 2021-02-06 NOTE — PLAN OF CARE
Problem: Mobility - Impaired:  Goal: Mobility will improve to maximum level  Description: Mobility will improve to maximum level  2/6/2021 0854 by Vignesh Hughes RN  Outcome: Ongoing  2/6/2021 0600 by Salvador Stock RN  Outcome: Ongoing     Problem: Skin Integrity - Impaired:  Goal: Will show no infection signs and symptoms  Description: Will show no infection signs and symptoms  2/6/2021 0854 by Vignesh Hughes RN  Outcome: Ongoing  2/6/2021 0600 by Salvador Stock RN  Outcome: Ongoing  Goal: Absence of new skin breakdown  Description: Absence of new skin breakdown  2/6/2021 0854 by Vignesh Hughes RN  Outcome: Ongoing  2/6/2021 0600 by Salvador Stock RN  Outcome: Ongoing     Problem: Pain:  Goal: Pain level will decrease  Description: Pain level will decrease  2/6/2021 0854 by Vignesh Hughes RN  Outcome: Ongoing  2/6/2021 0600 by Salvador Stock RN  Outcome: Ongoing  Goal: Control of acute pain  Description: Control of acute pain  2/6/2021 0854 by Vignesh Hughes RN  Outcome: Ongoing  2/6/2021 0600 by Salvador Stock RN  Outcome: Ongoing  Goal: Control of chronic pain  Description: Control of chronic pain  Outcome: Ongoing     Problem: Safety:  Goal: Free from accidental physical injury  Description: Free from accidental physical injury  Outcome: Ongoing  Goal: Free from intentional harm  Description: Free from intentional harm  Outcome: Ongoing

## 2021-02-06 NOTE — H&P
Laterality Date    CHOLECYSTECTOMY  08/22/2013    laparoscopic    COLONOSCOPY      2012/ Dr. Mary Pettit Left        Medications Prior to Admission:  I obtained, documented, reviewed, and updated the patient's current medications. Prior to Admission medications    Medication Sig Start Date End Date Taking? Authorizing Provider   clindamycin (CLEOCIN) 300 MG capsule Take 1 capsule by mouth 3 times daily for 10 days 2/2/21 2/12/21 Yes Ace Forrester MD   apixaban (ELIQUIS) 5 MG TABS tablet Take 1 tablet by mouth 2 times daily 1/28/21  Yes ASHLEY Rivera CNP   omeprazole (PRILOSEC) 20 MG delayed release capsule Take 1 capsule by mouth daily 1/25/21  Yes ASHLEY Rivera CNP   celecoxib (CELEBREX) 100 MG capsule Take 1 capsule by mouth 2 times daily 1/25/21  Yes ASHLEY Rivera CNP   furosemide (LASIX) 20 MG tablet Take 1 tablet by mouth daily 1/25/21  Yes Coni Duverney, MD   rOPINIRole (REQUIP) 0.5 MG tablet Take 1 tablet by mouth 3 times daily 1/21/21  Yes ASHLEY Rivera CNP   chlorthalidone (HYGROTON) 25 MG tablet Take 25 mg by mouth daily   Yes Historical Provider, MD   amitriptyline (ELAVIL) 100 MG tablet Take 1 tablet by mouth nightly 8/31/20  Yes ASHLEY Rivera CNP   flecainide (TAMBOCOR) 50 MG tablet Take 1 tablet by mouth 2 times daily 7/27/20  Yes Coni Duverney, MD   metoprolol succinate (TOPROL XL) 50 MG extended release tablet Take 1 tablet by mouth daily 7/27/20  Yes Coni Duverney, MD   dilTIAZem (CARDIZEM CD) 240 MG extended release capsule Take 1 capsule by mouth daily 7/27/20 1/31/21  Coni Duverney, MD   Elastic Bandages & Supports ([address]) [address] 1 each by Does not apply route daily Knee high 20mmHg 1/3/19   ASHLEY Rivera CNP       Allergies:  Ampicillin and Sulfa antibiotics    Social History:   TOBACCO:   reports that she has never smoked.  She has never used smokeless tobacco.  ETOH:   reports no history of alcohol use. OCCUPATION:      Family History:       Problem Relation Age of Onset    Stroke Father     Diabetes Sister     Thyroid Disease Brother     Atrial Fibrillation Brother     Atrial Fibrillation Mother        REVIEW OF SYSTEMS:  Constitutional:  positive for  malaise  negative for  fevers and chills  HEENT:  negative for  hearing loss, ear drainage, earaches, nasal congestion and sore mouth  Neck:  No lumps or masses  Cardiac:  negative for  chest pain, dyspnea, palpitations  Respiratory:  negative for  dry cough, cough with sputum and dyspnea  Gastrointestinal:  negative for nausea, vomiting and abdominal pain  :  negative for frequency, dysuria and hesitancy  Musculoskeletal:  positive for  arthralgias and right leg swelling / redness  Neuro:  negative      Physical Exam:    Vitals: BP (!) 118/58   Pulse 67   Temp 97.7 °F (36.5 °C) (Oral)   Resp 20   Ht 5' 7\" (1.702 m)   Wt (!) 363 lb 11.2 oz (165 kg)   SpO2 93%   BMI 56.96 kg/m²   General appearance: alert, appears stated age and cooperative  Skin: Skin color, texture, turgor normal. No rashes or lesions  HEENT: Head: Normocephalic, no lesions, without obvious abnormality. Eye: Normal external eye, conjunctiva, lids cornea, ESPERANZA. Ears: Normal TM's bilaterally. Normal auditory canals and external ears. Non-tender. Nose: Normal external nose, mucus membranes and septum. Pharynx: Dental Hygiene adequate. Normal buccal mucosa. Normal pharynx. Neck: no adenopathy, no carotid bruit and supple, symmetrical, trachea midline  Lungs: clear to auscultation bilaterally  Heart: regular rate and rhythm, S1, S2 normal and II/VI systolic murmur heard best over the left sternal border. Abdomen: soft, non-tender; bowel sounds normal; no masses,  no organomegaly and morbidly obese.   Extremities: Right leg with 2 + edema with erythema just below the knee extending down to the top of her foot.  There is 2 open cracked areas on the bottom of the foot. No drainage. Neurologic: Mental status: Alert, oriented, thought content appropriate    CBC:   Recent Labs     02/05/21  1049   WBC 7.9   HGB 12.5        BMP:    Recent Labs     02/05/21  1049   *   K 4.3      CO2 22   BUN 17   CREATININE 1.34*   GLUCOSE 119*     Hepatic:   Recent Labs     02/05/21  1049   AST 19   ALT 23   BILITOT 0.39   ALKPHOS 111*     Troponin: No results for input(s): TROPONINI in the last 72 hours. BNP: No results for input(s): BNP in the last 72 hours. Lipids: No results for input(s): CHOL, HDL in the last 72 hours. Invalid input(s): LDLCALCU  ABGs: No results found for: PHART, PO2ART, GYH5SHN  INR: No results for input(s): INR in the last 72 hours. -----------------------------------------------------------------      Assessment and Plan   1. Worsening Cellulitis right lower leg - (failed on oral Cipro and Clindamycin). Placed on IV Zosyn and Vancomycin. ACE wraps ordered to decrease edema. 2.  CKD stage III - appears to be at baseline. 3.  H/O DVT / PE - on Eliquis. 4.  H/O SVT - controlled on Flecainide, Cardizem CD, and Toprol XL. 5.  MAYURI - uses CPAP. 6.  Morbid obesity. 7. HTN - Controlled on Toprol XL, Cardizem CD, Aldactone, and Lasix. 8.  GERD - controlled on PPI. 9.  Restless legs - controlled on Requip. Plan:  1. Admit to the hospital on cardiac monitor. 2.  IV Zosyn and Vancomycin with Pharmacy managing. 3.  ACE wrap legs to decrease edema. 4.  Keep right leg elevated to decrease edema. 5.  Continue on previous home medication. 6.  Continue Eliquis for DVT prophylaxis. 7.  Full code. Medical Necessity: In patient is appropriate for this patient secondary to the need for IV antibiotics. Estimated length of stay 3 days. The beneficiary may reasonably be expected to be discharged or transferred to a hospital within 96 hours after admission.       Patient Active Problem List   Diagnosis Code    Fibromyalgia B70.6    Follicular adenoma of thyroid gland D34    S/P laparoscopic cholecystectomy Z90.49    Right hip pain M25.551    Bilateral leg edema R60.0    Chronic fatigue R53.82    Left thyroid nodule E04.1    Chronic back pain greater than 3 months duration M54.9, G89.29    Shortness of breath R06.02    Pulmonary hypertension associated with unclear multi-factorial mechanisms (HCC) I27.29    Morbid obesity due to excess calories (HCC) E66.01    MAYURI (obstructive sleep apnea) G47.33    Essential hypertension I10    Coxitis M16.10    History of total hip replacement Z96.649    Gastroesophageal reflux disease without esophagitis K21.9    Polyarthritis M13.0    Mixed incontinence N39.46    Constipation K59.00    Acute cystitis without hematuria N30.00    RLS (restless legs syndrome) G25.81    Cellulitis of leg, right L03.115       DVT prophylaxis:   [] Lovenox   [] SCDs   [] SQ Heparin   [] Encourage ambulation, low risk for DVT, no chemical or mechanical    prophylaxis necessary      [x] Already on Anticoagulation    Advanced Care Plan    (x)  I confirmed that the patient's Advanced Care Plan is present, code status documented, or surrogate decision maker is listed in the patient's medical record. ( )  The patient's advanced care plan is not present because:  (select)   ( ) I confirmed today that the patient does not wish or was not able to name a surrogate decision maker or provide an [address] ( ) Hospice care is currently being provided or has been provided this calender year. ( )  I did not confirm today the presence of an [address] or surrogate decision maker documented within the patient's medical record. (Does not satisfy [facility] performance). Documentation of Current Medications in the Medical Record    (x)  I have utilized all available immediate resources to obtain, update, or review the patient's current medications. If Yes, Stop Here  ( ) The patient is not eligivel for medications reconciliation; the patient is in an emergent medical situation where delaying treatment would jeopardize the patient's health. ( ) I did not confirm, update or review the patient's current list of medications today.   (does not satisfy [facility] performance)      Randall De Souza MD  Admitting Hospitalist

## 2021-02-06 NOTE — PLAN OF CARE
Problem: Discharge Planning:  Goal: Discharged to appropriate level of care  Description: Discharged to appropriate level of care  Outcome: Ongoing     Problem:  Body Temperature - Imbalanced:  Goal: Ability to maintain a body temperature in the normal range will improve  Description: Ability to maintain a body temperature in the normal range will improve  2/6/2021 0854 by Carlos Macdonald RN  Outcome: Ongoing  2/6/2021 0600 by Arlette Cuadra RN  Outcome: Ongoing     Problem: Mobility - Impaired:  Goal: Mobility will improve to maximum level  Description: Mobility will improve to maximum level  2/6/2021 0854 by Carlos Macdonald RN  Outcome: Ongoing  2/6/2021 0600 by Arlette Cuadra RN  Outcome: Ongoing     Problem: Pain:  Goal: Pain level will decrease  Description: Pain level will decrease  2/6/2021 0854 by Carlos Macdonald RN  Outcome: Ongoing  2/6/2021 0600 by Arlette Cuadra RN  Outcome: Ongoing  Goal: Control of acute pain  Description: Control of acute pain  2/6/2021 0854 by Carlos Macdonald RN  Outcome: Ongoing  2/6/2021 0600 by Arlette Cuadra RN  Outcome: Ongoing  Goal: Control of chronic pain  Description: Control of chronic pain  Outcome: Ongoing     Problem: Skin Integrity - Impaired:  Goal: Will show no infection signs and symptoms  Description: Will show no infection signs and symptoms  2/6/2021 0854 by Carlos Macdonald RN  Outcome: Ongoing  2/6/2021 0600 by Arlette Cuadra RN  Outcome: Ongoing  Goal: Absence of new skin breakdown  Description: Absence of new skin breakdown  2/6/2021 0854 by Carlos Macdonald RN  Outcome: Ongoing  2/6/2021 0600 by Arlette Cuadra RN  Outcome: Ongoing     Problem: Infection:  Goal: Will remain free from infection  Description: Will remain free from infection  2/6/2021 0854 by Carlos Macdonald RN  Outcome: Ongoing  2/6/2021 0600 by Arlette Cuadra RN  Outcome: Ongoing     Problem: Safety:  Goal: Free from accidental physical injury  Description: Free from accidental physical injury  Outcome: Ongoing  Goal: Free from intentional harm  Description: Free from intentional harm  Outcome: Ongoing

## 2021-02-06 NOTE — PLAN OF CARE
Problem:  Body Temperature - Imbalanced:  Goal: Ability to maintain a body temperature in the normal range will improve  Description: Ability to maintain a body temperature in the normal range will improve  2/6/2021 0600 by Gary Lainez RN  Outcome: Ongoing  2/5/2021 1720 by Enrique Anderson RN  Outcome: Ongoing     Problem: Mobility - Impaired:  Goal: Mobility will improve to maximum level  Description: Mobility will improve to maximum level  2/6/2021 0600 by Gary Lainez RN  Outcome: Ongoing  2/5/2021 1720 by Enrique Anderson RN  Outcome: Ongoing     Problem: Pain:  Goal: Pain level will decrease  Description: Pain level will decrease  2/6/2021 0600 by Gary Lainez RN  Outcome: Ongoing  2/5/2021 1720 by Enrique Anderson RN  Outcome: Ongoing  Goal: Control of acute pain  Description: Control of acute pain  Outcome: Ongoing     Problem: Skin Integrity - Impaired:  Goal: Will show no infection signs and symptoms  Description: Will show no infection signs and symptoms  2/6/2021 0600 by Gary Lainez RN  Outcome: Ongoing  2/5/2021 1720 by Enrique Anderson RN  Outcome: Ongoing  Goal: Absence of new skin breakdown  Description: Absence of new skin breakdown  Outcome: Ongoing     Problem: Infection:  Goal: Will remain free from infection  Description: Will remain free from infection  2/6/2021 0600 by Gary Lainez RN  Outcome: Ongoing  2/5/2021 1720 by Enrique Anderson RN  Outcome: Ongoing     Problem: Daily Care:  Goal: Daily care needs are met  Description: Daily care needs are met  2/6/2021 0600 by Gary Lainez RN  Outcome: Ongoing  2/5/2021 1720 by Enrique Anderson RN  Outcome: Ongoing     Problem: Pain:  Goal: Patient's pain/discomfort is manageable  Description: Patient's pain/discomfort is manageable  Outcome: Ongoing     Problem: Skin Integrity:  Goal: Skin integrity will stabilize  Description: Skin integrity will stabilize  2/6/2021 0600 by Louise Avila Christian Carbone RN  Outcome: Ongoing  2/5/2021 1720 by Neha Maya RN  Outcome: Ongoing

## 2021-02-07 LAB
VANCOMYCIN TROUGH DATE LAST DOSE: ABNORMAL
VANCOMYCIN TROUGH DOSE AMOUNT: ABNORMAL
VANCOMYCIN TROUGH TIME LAST DOSE: ABNORMAL
VANCOMYCIN TROUGH: 20.9 UG/ML (ref 10–20)

## 2021-02-07 PROCEDURE — 94761 N-INVAS EAR/PLS OXIMETRY MLT: CPT

## 2021-02-07 PROCEDURE — 6370000000 HC RX 637 (ALT 250 FOR IP): Performed by: INTERNAL MEDICINE

## 2021-02-07 PROCEDURE — 2580000003 HC RX 258: Performed by: INTERNAL MEDICINE

## 2021-02-07 PROCEDURE — 6360000002 HC RX W HCPCS: Performed by: INTERNAL MEDICINE

## 2021-02-07 PROCEDURE — 80202 ASSAY OF VANCOMYCIN: CPT

## 2021-02-07 PROCEDURE — 1200000000 HC SEMI PRIVATE

## 2021-02-07 PROCEDURE — 36415 COLL VENOUS BLD VENIPUNCTURE: CPT

## 2021-02-07 RX ORDER — VANCOMYCIN HYDROCHLORIDE 1 G/20ML
INJECTION, POWDER, LYOPHILIZED, FOR SOLUTION INTRAVENOUS
Status: DISPENSED
Start: 2021-02-07 | End: 2021-02-07

## 2021-02-07 RX ORDER — VANCOMYCIN HYDROCHLORIDE 500 MG/10ML
INJECTION, POWDER, LYOPHILIZED, FOR SOLUTION INTRAVENOUS
Status: DISPENSED
Start: 2021-02-07 | End: 2021-02-08

## 2021-02-07 RX ORDER — LACTOBACILLUS RHAMNOSUS GG 10B CELL
1 CAPSULE ORAL 2 TIMES DAILY WITH MEALS
Status: DISCONTINUED | OUTPATIENT
Start: 2021-02-07 | End: 2021-02-10 | Stop reason: HOSPADM

## 2021-02-07 RX ORDER — VANCOMYCIN HYDROCHLORIDE 500 MG/10ML
INJECTION, POWDER, LYOPHILIZED, FOR SOLUTION INTRAVENOUS
Status: DISPENSED
Start: 2021-02-07 | End: 2021-02-07

## 2021-02-07 RX ORDER — VANCOMYCIN HYDROCHLORIDE 1 G/20ML
INJECTION, POWDER, LYOPHILIZED, FOR SOLUTION INTRAVENOUS
Status: DISPENSED
Start: 2021-02-07 | End: 2021-02-08

## 2021-02-07 RX ADMIN — AMITRIPTYLINE HYDROCHLORIDE 100 MG: 25 TABLET, FILM COATED ORAL at 20:59

## 2021-02-07 RX ADMIN — SPIRONOLACTONE 25 MG: 25 TABLET, FILM COATED ORAL at 08:41

## 2021-02-07 RX ADMIN — ROPINIROLE 0.5 MG: 0.5 TABLET, FILM COATED ORAL at 13:55

## 2021-02-07 RX ADMIN — VANCOMYCIN HYDROCHLORIDE 1250 MG: 1 INJECTION, POWDER, LYOPHILIZED, FOR SOLUTION INTRAVENOUS at 20:58

## 2021-02-07 RX ADMIN — PANTOPRAZOLE SODIUM 40 MG: 40 TABLET, DELAYED RELEASE ORAL at 05:21

## 2021-02-07 RX ADMIN — ACETAMINOPHEN 650 MG: 325 TABLET, FILM COATED ORAL at 11:35

## 2021-02-07 RX ADMIN — Medication 1 CAPSULE: at 08:42

## 2021-02-07 RX ADMIN — DILTIAZEM HYDROCHLORIDE 240 MG: 120 CAPSULE, COATED, EXTENDED RELEASE ORAL at 08:41

## 2021-02-07 RX ADMIN — ACETAMINOPHEN 650 MG: 325 TABLET, FILM COATED ORAL at 05:34

## 2021-02-07 RX ADMIN — PIPERACILLIN SODIUM AND TAZOBACTAM SODIUM 3375 MG: 3; .375 INJECTION, POWDER, LYOPHILIZED, FOR SOLUTION INTRAVENOUS at 08:41

## 2021-02-07 RX ADMIN — ROPINIROLE 0.5 MG: 0.5 TABLET, FILM COATED ORAL at 20:59

## 2021-02-07 RX ADMIN — Medication 1 CAPSULE: at 16:14

## 2021-02-07 RX ADMIN — FLECAINIDE ACETATE 50 MG: 100 TABLET ORAL at 20:59

## 2021-02-07 RX ADMIN — ACETAMINOPHEN 650 MG: 325 TABLET, FILM COATED ORAL at 21:00

## 2021-02-07 RX ADMIN — METOPROLOL SUCCINATE 50 MG: 50 TABLET, EXTENDED RELEASE ORAL at 08:41

## 2021-02-07 RX ADMIN — PIPERACILLIN SODIUM AND TAZOBACTAM SODIUM 3375 MG: 3; .375 INJECTION, POWDER, LYOPHILIZED, FOR SOLUTION INTRAVENOUS at 23:48

## 2021-02-07 RX ADMIN — SODIUM CHLORIDE, PRESERVATIVE FREE 10 ML: 5 INJECTION INTRAVENOUS at 21:01

## 2021-02-07 RX ADMIN — PIPERACILLIN SODIUM AND TAZOBACTAM SODIUM 3375 MG: 3; .375 INJECTION, POWDER, LYOPHILIZED, FOR SOLUTION INTRAVENOUS at 13:55

## 2021-02-07 RX ADMIN — APIXABAN 5 MG: 5 TABLET, FILM COATED ORAL at 08:41

## 2021-02-07 RX ADMIN — SODIUM CHLORIDE, PRESERVATIVE FREE 10 ML: 5 INJECTION INTRAVENOUS at 08:44

## 2021-02-07 RX ADMIN — FLECAINIDE ACETATE 50 MG: 100 TABLET ORAL at 08:41

## 2021-02-07 RX ADMIN — VANCOMYCIN HYDROCHLORIDE 1500 MG: 500 INJECTION, POWDER, LYOPHILIZED, FOR SOLUTION INTRAVENOUS at 05:22

## 2021-02-07 RX ADMIN — FUROSEMIDE 20 MG: 20 TABLET ORAL at 08:41

## 2021-02-07 RX ADMIN — SPIRONOLACTONE 25 MG: 25 TABLET, FILM COATED ORAL at 21:00

## 2021-02-07 RX ADMIN — ROPINIROLE 0.5 MG: 0.5 TABLET, FILM COATED ORAL at 08:41

## 2021-02-07 RX ADMIN — APIXABAN 5 MG: 5 TABLET, FILM COATED ORAL at 21:00

## 2021-02-07 ASSESSMENT — PAIN DESCRIPTION - PROGRESSION
CLINICAL_PROGRESSION: GRADUALLY IMPROVING

## 2021-02-07 ASSESSMENT — PAIN SCALES - GENERAL
PAINLEVEL_OUTOF10: 2
PAINLEVEL_OUTOF10: 1

## 2021-02-07 NOTE — PROGRESS NOTES
Pharmacy Vancomycin Consult     Vancomycin Day: 3  Current Dosin mg q12h    Temp max:  Afebrile    Recent Labs     21  1049 21  0520   BUN 17 15       Recent Labs     21  1049 21  0520   CREATININE 1.34* 1.26*       Recent Labs     21  1049 21  0520   WBC 7.9 8.0         Intake/Output Summary (Last 24 hours) at 2021 1337  Last data filed at 2021 1240  Gross per 24 hour   Intake 1923 ml   Output 2100 ml   Net -177 ml       Ht Readings from Last 1 Encounters:   21 5' 7\" (1.702 m)        Wt Readings from Last 1 Encounters:   21 (!) 363 lb 11.2 oz (165 kg)         Body mass index is 56.96 kg/m². Estimated Creatinine Clearance: 77 mL/min (A) (based on SCr of 1.26 mg/dL (H)). Trough: 20.9 (~10 hours post dose)    Assessment/Plan:  Will reduce the patient's regimen to 1250 mg q12h at this time. Will continue to follow renal fxn closely. A repeat trough has not been ordered at this time. Plan prior 4th of new regimen. Thank you. Will continue to follow.   Richard RomeroD

## 2021-02-07 NOTE — PROGRESS NOTES
SpO2 97%   BMI 56.96 kg/m²   General appearance: alert and cooperative with exam  HEENT: Head: Normocephalic, no lesions, without obvious abnormality. Eye: Normal external eye, conjunctiva, lids cornea, ESPERANZA. Nose: Normal external nose, mucus membranes and septum. Neck: no adenopathy, no carotid bruit and supple, symmetrical, trachea midline  Lungs: clear to auscultation bilaterally  Heart: regular rate and rhythm, S1, S2 normal and II/VI systolic murmur  Abdomen: soft, non-tender; bowel sounds normal; no masses,  no organomegaly and obese  Extremities: Right leg with redness but less intense. Some areas are below the drawn line. The leg is less warm and less painful to palpation. Neurologic: Mental status: Alert, oriented, thought content appropriate    Assessment and Plan:   1. Worsening Cellulitis right lower leg - (failed on oral Cipro and Clindamycin). Placed on IV Zosyn and Vancomycin. ACE wraps ordered to decrease edema. Cellulitis improving. 2.  CKD stage III - appears to be at baseline. 3.  H/O DVT / PE - on Eliquis. 4.  H/O SVT - controlled on Flecainide, Cardizem CD, and Toprol XL. 5.  MAYURI - uses CPAP. 6.  Morbid obesity. 7. HTN - Controlled on Toprol XL, Cardizem CD, Aldactone, and Lasix. 8.  GERD - controlled on PPI. 9.  Restless legs - controlled on Requip. Plan:  1. Patient requesting to go home today but I encouraged her to stay since we are seeing improvement with the IV antibiotics. 2.  Repeat labs in am.  3.  Add Probiotic BID to help prevent diarrhea.         Patient continues to require in patient admission secondary to the need for IV antibiotics.       DVT prophylaxis:   [] Lovenox   [] SCDs   [] SQ Heparin   [] Encourage ambulation, low risk for DVT, no chemical or mechanical    prophylaxis necessary      [x] Already on Anticoagulation    Patient Active Problem List:     Fibromyalgia     Follicular adenoma of thyroid gland     S/P laparoscopic cholecystectomy Right hip pain     Bilateral leg edema     Chronic fatigue     Left thyroid nodule     Chronic back pain greater than 3 months duration     Shortness of breath     Pulmonary hypertension associated with unclear multi-factorial mechanisms ([address]     Morbid obesity due to excess calories (HCC)     MAYURI (obstructive sleep apnea)     Essential hypertension     Coxitis     History of total hip replacement     Gastroesophageal reflux disease without esophagitis     Polyarthritis     Mixed incontinence     Constipation     Acute cystitis without hematuria     RLS (restless legs syndrome)     Cellulitis of leg, right          Advanced Care Plan    (x)  I confirmed that the patient's [address] is present, code status documented, or surrogate decision maker is listed in the patient's medical record. ( )  The patient's advanced care plan is not present because:  (select)   ( ) I confirmed today that the patient does not wish or was not able to name a surrogate decision maker or provide an [address]. ( ) Hospice care is currently being provided or has been provided this calender year. ( )  I did not confirm today the presence of an [address] or surrogate decision maker documented within the patient's medical record. (Does not satisfy [facility] performance). Documentation of Current Medications in the Medical Record    (x)  I have utilized all available immediate resources to obtain, update, or review the patient's current medications. If Yes, Stop Here  ( ) The patient is not eligivel for medications reconciliation; the patient is in an emergent medical situation where delaying treatment would jeopardize the patient's health. ( ) I did not confirm, update or review the patient's current list of medications today.   (does not satisfy [facility] performance)      Michelle Hoang MD  [facility]

## 2021-02-08 ENCOUNTER — TELEPHONE (OUTPATIENT)
Dept: FAMILY MEDICINE CLINIC | Age: 61
End: 2021-02-08

## 2021-02-08 LAB
ABSOLUTE EOS #: 0 K/UL (ref 0–0.4)
ABSOLUTE IMMATURE GRANULOCYTE: ABNORMAL K/UL (ref 0–0.3)
ABSOLUTE LYMPH #: 1.8 K/UL (ref 1–4.8)
ABSOLUTE MONO #: 0.6 K/UL (ref 0–1)
ANION GAP SERPL CALCULATED.3IONS-SCNC: 8 MMOL/L (ref 9–17)
BASOPHILS # BLD: 0 % (ref 0–2)
BASOPHILS ABSOLUTE: 0 K/UL (ref 0–0.2)
BUN BLDV-MCNC: 12 MG/DL (ref 8–23)
BUN/CREAT BLD: 9 (ref 9–20)
CALCIUM SERPL-MCNC: 9.6 MG/DL (ref 8.6–10.4)
CHLORIDE BLD-SCNC: 103 MMOL/L (ref 98–107)
CO2: 26 MMOL/L (ref 20–31)
CREAT SERPL-MCNC: 1.35 MG/DL (ref 0.5–0.9)
CULTURE: NORMAL
DIFFERENTIAL TYPE: YES
EOSINOPHILS RELATIVE PERCENT: 0 % (ref 0–5)
GFR AFRICAN AMERICAN: 48 ML/MIN
GFR NON-AFRICAN AMERICAN: 40 ML/MIN
GFR SERPL CREATININE-BSD FRML MDRD: ABNORMAL ML/MIN/{1.73_M2}
GFR SERPL CREATININE-BSD FRML MDRD: ABNORMAL ML/MIN/{1.73_M2}
GLUCOSE BLD-MCNC: 106 MG/DL (ref 70–99)
HCT VFR BLD CALC: 36.1 % (ref 36–46)
HEMOGLOBIN: 12 G/DL (ref 12–16)
IMMATURE GRANULOCYTES: ABNORMAL %
LYMPHOCYTES # BLD: 23 % (ref 15–40)
Lab: NORMAL
MCH RBC QN AUTO: 28.2 PG (ref 26–34)
MCHC RBC AUTO-ENTMCNC: 33.2 G/DL (ref 31–37)
MCV RBC AUTO: 85.1 FL (ref 80–100)
MONOCYTES # BLD: 8 % (ref 4–8)
NRBC AUTOMATED: ABNORMAL PER 100 WBC
PDW BLD-RTO: 16.4 % (ref 12.1–15.2)
PLATELET # BLD: 397 K/UL (ref 140–450)
PLATELET ESTIMATE: ABNORMAL
PMV BLD AUTO: ABNORMAL FL (ref 6–12)
POTASSIUM SERPL-SCNC: 4.1 MMOL/L (ref 3.7–5.3)
RBC # BLD: 4.25 M/UL (ref 4–5.2)
RBC # BLD: ABNORMAL 10*6/UL
SEG NEUTROPHILS: 69 % (ref 47–75)
SEGMENTED NEUTROPHILS ABSOLUTE COUNT: 5.4 K/UL (ref 2.5–7)
SODIUM BLD-SCNC: 137 MMOL/L (ref 135–144)
SPECIMEN DESCRIPTION: NORMAL
WBC # BLD: 7.8 K/UL (ref 3.5–11)
WBC # BLD: ABNORMAL 10*3/UL

## 2021-02-08 PROCEDURE — 6360000002 HC RX W HCPCS: Performed by: INTERNAL MEDICINE

## 2021-02-08 PROCEDURE — 94761 N-INVAS EAR/PLS OXIMETRY MLT: CPT

## 2021-02-08 PROCEDURE — 2580000003 HC RX 258: Performed by: INTERNAL MEDICINE

## 2021-02-08 PROCEDURE — 2580000003 HC RX 258: Performed by: FAMILY MEDICINE

## 2021-02-08 PROCEDURE — 6370000000 HC RX 637 (ALT 250 FOR IP): Performed by: INTERNAL MEDICINE

## 2021-02-08 PROCEDURE — 93005 ELECTROCARDIOGRAM TRACING: CPT | Performed by: INTERNAL MEDICINE

## 2021-02-08 PROCEDURE — 1200000000 HC SEMI PRIVATE

## 2021-02-08 PROCEDURE — 36415 COLL VENOUS BLD VENIPUNCTURE: CPT

## 2021-02-08 PROCEDURE — 93005 ELECTROCARDIOGRAM TRACING: CPT | Performed by: FAMILY MEDICINE

## 2021-02-08 PROCEDURE — 6360000002 HC RX W HCPCS: Performed by: FAMILY MEDICINE

## 2021-02-08 PROCEDURE — 2500000003 HC RX 250 WO HCPCS: Performed by: INTERNAL MEDICINE

## 2021-02-08 PROCEDURE — 85025 COMPLETE CBC W/AUTO DIFF WBC: CPT

## 2021-02-08 PROCEDURE — 80048 BASIC METABOLIC PNL TOTAL CA: CPT

## 2021-02-08 RX ORDER — METOPROLOL TARTRATE 5 MG/5ML
5 INJECTION INTRAVENOUS EVERY 6 HOURS PRN
Status: DISCONTINUED | OUTPATIENT
Start: 2021-02-08 | End: 2021-02-10 | Stop reason: HOSPADM

## 2021-02-08 RX ORDER — HYDROCODONE BITARTRATE AND ACETAMINOPHEN 5; 325 MG/1; MG/1
1 TABLET ORAL EVERY 6 HOURS PRN
Status: DISCONTINUED | OUTPATIENT
Start: 2021-02-08 | End: 2021-02-10 | Stop reason: HOSPADM

## 2021-02-08 RX ORDER — DILTIAZEM HYDROCHLORIDE 5 MG/ML
10 INJECTION INTRAVENOUS ONCE
Status: COMPLETED | OUTPATIENT
Start: 2021-02-08 | End: 2021-02-08

## 2021-02-08 RX ORDER — METOPROLOL TARTRATE 5 MG/5ML
10 INJECTION INTRAVENOUS ONCE
Status: COMPLETED | OUTPATIENT
Start: 2021-02-08 | End: 2021-02-08

## 2021-02-08 RX ADMIN — ROPINIROLE 0.5 MG: 0.5 TABLET, FILM COATED ORAL at 08:21

## 2021-02-08 RX ADMIN — PIPERACILLIN SODIUM AND TAZOBACTAM SODIUM 3375 MG: 3; .375 INJECTION, POWDER, LYOPHILIZED, FOR SOLUTION INTRAVENOUS at 08:03

## 2021-02-08 RX ADMIN — Medication 1 CAPSULE: at 16:27

## 2021-02-08 RX ADMIN — APIXABAN 5 MG: 5 TABLET, FILM COATED ORAL at 08:21

## 2021-02-08 RX ADMIN — PANTOPRAZOLE SODIUM 40 MG: 40 TABLET, DELAYED RELEASE ORAL at 07:42

## 2021-02-08 RX ADMIN — DILTIAZEM HYDROCHLORIDE 10 MG: 5 INJECTION INTRAVENOUS at 18:10

## 2021-02-08 RX ADMIN — FUROSEMIDE 20 MG: 20 TABLET ORAL at 08:22

## 2021-02-08 RX ADMIN — DEXTROSE 1 MG/MIN: 5 SOLUTION INTRAVENOUS at 23:22

## 2021-02-08 RX ADMIN — DILTIAZEM HYDROCHLORIDE 240 MG: 120 CAPSULE, COATED, EXTENDED RELEASE ORAL at 08:21

## 2021-02-08 RX ADMIN — METOPROLOL SUCCINATE 50 MG: 50 TABLET, EXTENDED RELEASE ORAL at 08:21

## 2021-02-08 RX ADMIN — APIXABAN 5 MG: 5 TABLET, FILM COATED ORAL at 20:34

## 2021-02-08 RX ADMIN — ROPINIROLE 0.5 MG: 0.5 TABLET, FILM COATED ORAL at 20:34

## 2021-02-08 RX ADMIN — METOROPROLOL TARTRATE 10 MG: 5 INJECTION, SOLUTION INTRAVENOUS at 19:16

## 2021-02-08 RX ADMIN — FLECAINIDE ACETATE 50 MG: 100 TABLET ORAL at 08:21

## 2021-02-08 RX ADMIN — ACETAMINOPHEN 650 MG: 325 TABLET, FILM COATED ORAL at 03:57

## 2021-02-08 RX ADMIN — ROPINIROLE 0.5 MG: 0.5 TABLET, FILM COATED ORAL at 16:27

## 2021-02-08 RX ADMIN — HYDROCODONE BITARTRATE AND ACETAMINOPHEN 1 TABLET: 5; 325 TABLET ORAL at 08:21

## 2021-02-08 RX ADMIN — VANCOMYCIN HYDROCHLORIDE 1250 MG: 1 INJECTION, POWDER, LYOPHILIZED, FOR SOLUTION INTRAVENOUS at 08:00

## 2021-02-08 RX ADMIN — PIPERACILLIN SODIUM AND TAZOBACTAM SODIUM 3375 MG: 3; .375 INJECTION, POWDER, LYOPHILIZED, FOR SOLUTION INTRAVENOUS at 16:30

## 2021-02-08 RX ADMIN — SPIRONOLACTONE 25 MG: 25 TABLET, FILM COATED ORAL at 08:21

## 2021-02-08 RX ADMIN — FLECAINIDE ACETATE 50 MG: 100 TABLET ORAL at 20:34

## 2021-02-08 RX ADMIN — HYDROCODONE BITARTRATE AND ACETAMINOPHEN 1 TABLET: 5; 325 TABLET ORAL at 16:27

## 2021-02-08 RX ADMIN — VANCOMYCIN HYDROCHLORIDE 1250 MG: 1 INJECTION, POWDER, LYOPHILIZED, FOR SOLUTION INTRAVENOUS at 22:25

## 2021-02-08 RX ADMIN — METOROPROLOL TARTRATE 5 MG: 5 INJECTION, SOLUTION INTRAVENOUS at 15:16

## 2021-02-08 RX ADMIN — Medication 1 CAPSULE: at 08:21

## 2021-02-08 RX ADMIN — SPIRONOLACTONE 25 MG: 25 TABLET, FILM COATED ORAL at 20:34

## 2021-02-08 RX ADMIN — AMITRIPTYLINE HYDROCHLORIDE 100 MG: 25 TABLET, FILM COATED ORAL at 20:34

## 2021-02-08 ASSESSMENT — PAIN DESCRIPTION - PROGRESSION
CLINICAL_PROGRESSION: GRADUALLY IMPROVING

## 2021-02-08 ASSESSMENT — PAIN SCALES - GENERAL: PAINLEVEL_OUTOF10: 3

## 2021-02-08 NOTE — TELEPHONE ENCOUNTER
Patient discharging tomorrow - Cellulitis - scheduled 02/15 for follow up    Health Maintenance   Topic Date Due    DTaP/Tdap/Td vaccine (1 - Tdap) 04/15/1979    Shingles Vaccine (1 of 2) 04/15/2010    Cervical cancer screen  09/15/2017    Breast cancer screen  05/10/2018    A1C test (Diabetic or Prediabetic)  10/27/2021    Potassium monitoring  02/08/2022    Creatinine monitoring  02/08/2022    Colon cancer screen colonoscopy  04/05/2022    Lipid screen  08/28/2025    Flu vaccine  Completed    Hepatitis C screen  Addressed    HIV screen  Addressed    Hepatitis A vaccine  Aged Out    Hepatitis B vaccine  Aged Out    Hib vaccine  Aged Out    Meningococcal (ACWY) vaccine  Aged Out    Pneumococcal 0-64 years Vaccine  Aged Out             (applicable per patient's age: Cancer Screenings, Depression Screening, Fall Risk Screening, Immunizations)    Hemoglobin A1C (%)   Date Value   10/27/2020 6.1   06/17/2019 6.1   01/04/2016 5.4     LDL Cholesterol (mg/dL)   Date Value   08/28/2020 102     LDL Calculated (mg/dL)   Date Value   05/23/2019 131     AST (U/L)   Date Value   02/05/2021 19     ALT (U/L)   Date Value   02/05/2021 23     BUN (mg/dL)   Date Value   02/08/2021 12      (goal A1C is < 7)   (goal LDL is <100) need 30-50% reduction from baseline     BP Readings from Last 3 Encounters:   02/08/21 94/74   02/02/21 120/71   02/02/21 102/70    (goal /80)      All Future Testing planned in [facility]:  Lab Frequency Next Occurrence   Basic Metabolic Panel Once 30/23/6294   TSH with Reflex Once 03/23/2021   CBC Auto Differential Once 03/23/2021   Comprehensive Metabolic Panel Once 16/45/3716   Vitamin D 25 Hydroxy Once 03/23/2021   Lipid Panel Once 03/23/2021   Magnesium Once 03/23/2021   Comprehensive Metabolic Panel Once 62/06/8609   EKG 12 Lead Once 03/30/2021   Comprehensive Metabolic Panel Once 66/07/7486   CBC Auto Differential Once 02/02/2022   Lactic Acid, Plasma Once 02/02/2021   Intake and output EVERY 8 HOURS    Initiate Oxygen Therapy Protocol DAILY (RT)    Up with assistance PRN    Home BIPAP or CPAP DAILY (RT)    Vancomycin, trough ONE TIME        Next Visit Date:  Future Appointments   Date Time Provider Kya Trimble   2/15/2021 10:20 AM ASHLEY Herrera - MARIAN ACEVEDO [facility]   3/30/2021 10:00 AM MD Suraj Kaufman [facility]            Patient Active Problem List:     Fibromyalgia     Follicular adenoma of thyroid gland     S/P laparoscopic cholecystectomy     Right hip pain     Bilateral leg edema     Chronic fatigue     Left thyroid nodule     Chronic back pain greater than 3 months duration     Shortness of breath     Pulmonary hypertension associated with unclear multi-factorial mechanisms ([address]     Morbid obesity due to excess calories (HCC)     MAYURI (obstructive sleep apnea)     Essential hypertension     Coxitis     History of total hip replacement     Gastroesophageal reflux disease without esophagitis     Polyarthritis     Mixed incontinence     Constipation     Acute cystitis without hematuria     RLS (restless legs syndrome)     Cellulitis of leg, right

## 2021-02-08 NOTE — PROGRESS NOTES
Dr. Waddell Peabody called concerning elevated heart rate of 130. Orders for IV cardizem. Pt updated on condition with questions answered.

## 2021-02-08 NOTE — PROGRESS NOTES
Nutrition Assessment     Type and Reason for Visit: Initial    Nutrition Recommendations/Plan:  Encourage yogurt use     Nutrition Assessment:  Obesity r/t excess energy intakes relative to expenditure, AEB BMI >55. Mild losses noted over time, using [employer]ystem at home. Weight currently up with edema/cellulitis. Denies ingestion issues. On a probiotic to aid with atb induced diarrhea, and advised use of yogurt as well. Low vit D pta, not on supplemental vit D. Will advise MD.    Malnutrition Assessment:  Malnutrition Status: No malnutrition    Nutrition Related Findings: obese      Current Nutrition Therapies:    DIET CARDIAC;     Anthropometric Measures:  · Height: 5' 7\" (170.2 cm)  · Current Body Wt: 363 lb 11.2 oz (165 kg)   · BMI: 57    Nutrition Diagnosis:   · Overweight/Obese related to excessive energy intake as evidenced by BMI    Lab Results   Component Value Date     02/08/2021    K 4.1 02/08/2021     02/08/2021    CO2 26 02/08/2021    BUN 12 02/08/2021    CREATININE 1.35 (H) 02/08/2021    GLUCOSE 106 (H) 02/08/2021    CALCIUM 9.6 02/08/2021    PROT 6.9 02/05/2021    LABALBU 3.5 02/05/2021    BILITOT 0.39 02/05/2021    ALKPHOS 111 (H) 02/05/2021    AST 19 02/05/2021    ALT 23 02/05/2021    LABGLOM 40 (L) 02/08/2021    GFRAA 48 (L) 02/08/2021     Lab Results   Component Value Date    LABA1C 6.1 10/27/2020     Lab Results   Component Value Date     01/04/2016     Lab Results   Component Value Date    VITD25 27.5 (L) 08/28/2020     Nutrition Interventions:   Food and/or Nutrient Delivery:  Continue Current Diet  Nutrition Education/Counseling:  Education initiated   Coordination of Nutrition Care:  Continue to monitor while inpatient    Goals:  PO >75% meals with yogurt bid       Nutrition Monitoring and Evaluation:   Behavioral-Environmental Outcomes:  None Identified   Food/Nutrient Intake Outcomes:  Food and Nutrient Intake  Physical Signs/Symptoms Outcomes:  Biochemical Data, Weight, Diarrhea     Discharge Planning:    Continue current diet     Electronically signed by Juliana Guillen RD, LD on 2/8/21 at 9:23 AM EST    Contact: [phone]

## 2021-02-08 NOTE — PROGRESS NOTES
101 103 103   CO2 22 27 26   BUN 17 15 12   CREATININE 1.34* 1.26* 1.35*   GLUCOSE 119* 145* 106*     Hepatic:   Recent Labs     02/05/21  1049   AST 19   ALT 23   BILITOT 0.39   ALKPHOS 111*       Physical Exam:  General Appearance: alert and oriented to person, place and time, in no acute distress  Cardiovascular: normal rate, regular rhythm, normal S1 and S2  Pulmonary/Chest: clear to auscultation bilaterally- no wheezes, rales or rhonchi, normal air movement, no respiratory distress  Abdomen: soft, non-tender, non-distended, normal bowel sounds, no masses   Extremities: Right lower extremity with extensive redness in the lower part, although not is angry looking, no open wounds present  Neurological: alert, oriented, normal speech, no focal findings or movement disorder noted    Assessment and Plan:     1. Right lower extremity extensive cellulitis-failed outpatient antibiotics, on empiric IV Zosyn and vancomycin. Blood cultures show no growth. Clinically improving. We will continue on antibiotics for 1 more day and possible discharge home tomorrow  2. CKD stage 3 -close to baseline  3. History of DVT/PE -on Eliquis  4. History of SVTs -heart rate stable on flecainide, Cardizem CD and Toprol-XL  5. Obstructive sleep apnea -on CPAP  6. Morbid obesity with BMI 56.9  7. GERD  -on Protonix  8. Hypertension  9. Restless leg syndrome -on Requip        Advanced Care Plan    (x)  I confirmed that the patient's Advanced Care Plan is present, code status documented, or surrogate decision maker is listed in the patient's medical record. ( )  The patient's advanced care plan is not present because:  (select)              ( ) I confirmed today that the patient does not wish or was not able to name a surrogate decision maker or provide an [address] ( ) Hospice care is currently being provided or has been provided this calender year.   ( )  I did not confirm today the presence of an Advance Care Plan or surrogate decision maker documented within the patient's medical record. (Does not satisfy [facility] performance).       Documentation of Current Medications in the Medical Record    (x)  I have utilized all available immediate resources to obtain, update, or review the patient's current medications. If Yes, Stop Here  ( ) The patient is not eligivel for medications reconciliation; the patient is in an emergent medical situation where delaying treatment would jeopardize the patient's health. ( ) I did not confirm, update or review the patient's current list of medications today.   (does not satisfy [facility] performance)    Electronically signed by John Colindres MD on 2/8/2021 at 7:46 AM    [facility]

## 2021-02-08 NOTE — PLAN OF CARE
Ambulates to BR and then back to bed independently.  Takes Tylenol for pain which he states it helped,

## 2021-02-08 NOTE — PROGRESS NOTES
Vancomycin Day: 4    Patient's labs, cultures, vitals, and vancomycin regimen reviewed. No changes today. Dosage was reduced for last night's dosage, will order vanco trough for 2/9/2021 0730 prior to the 4th dose.    Electronically signed by Jade Warren, [address] [place] [address] on 2/8/2021 at 2:22 PM

## 2021-02-08 NOTE — PROGRESS NOTES
SW met with pt and spouse this morning to complete assessment during quality rounds with RN case manager. Pt is alert and oriented and pleasant with assessment. Pt is a 61year old  female admitted for cellulitis of leg. Pt lives with her spouse in their home in [facility]. Pt has a CPAP at home that she uses intermittently and no other DME. Pt was not utilizing any services prior to admission. Pt drives herself and is able to get to appointments. Pt is a full code and follows with Jordi Pat CNP as PCP. Pt does not have advance directives and is not currently interested in them at this time. Pt is aware that she can come anytime to complete if desired. Pt reports that her medications are affordable. Pt plans to return home at discharge and is hopeful this will be tomorrow. Pt identifies no discharge needs or concerns at this time. SW will remain available as needed.  Shaun Tanner MSW LSW 2/8/2021

## 2021-02-08 NOTE — PROGRESS NOTES
Quality flow rounds held on 2/8/21     Aminata Toney is admitted for  Cellulitis of leg    Length of stay 3. Education:    Needed Education: wound care, meds, follow up,diet      Do you have any questions regarding your plan of care while at the hospital? denies    Planned Disposition:               [x]  Home when able                [] Swing Bed                [] ECF/SNF               [] Other/TBD     Barriers to Discharge:    Can you afford your medications? yes   Do you have transportation to follow up appointments? Drives self   Do you need any new equipment at home? none   Current equipment includes   CPAP    Do you have a living will or durable power of  for healthcare? denies               If yes do we have a copy on file? n/a    Do you or your family have any questions or concerns we haven't already discussed? Denies    Ranjeet Fernandes and writer present for rounding. Pt lives with her  and denies needs at discharge.

## 2021-02-09 LAB
ANION GAP SERPL CALCULATED.3IONS-SCNC: 7 MMOL/L (ref 9–17)
BUN BLDV-MCNC: 15 MG/DL (ref 8–23)
BUN/CREAT BLD: 10 (ref 9–20)
CALCIUM SERPL-MCNC: 9.8 MG/DL (ref 8.6–10.4)
CHLORIDE BLD-SCNC: 101 MMOL/L (ref 98–107)
CO2: 27 MMOL/L (ref 20–31)
CREAT SERPL-MCNC: 1.56 MG/DL (ref 0.5–0.9)
EKG ATRIAL RATE: 125 BPM
EKG ATRIAL RATE: 220 BPM
EKG ATRIAL RATE: 71 BPM
EKG P AXIS: 36 DEGREES
EKG P-R INTERVAL: 186 MS
EKG Q-T INTERVAL: 312 MS
EKG Q-T INTERVAL: 330 MS
EKG Q-T INTERVAL: 370 MS
EKG QRS DURATION: 82 MS
EKG QRS DURATION: 86 MS
EKG QRS DURATION: 88 MS
EKG QTC CALCULATION (BAZETT): 402 MS
EKG QTC CALCULATION (BAZETT): 453 MS
EKG QTC CALCULATION (BAZETT): 458 MS
EKG R AXIS: 11 DEGREES
EKG R AXIS: 17 DEGREES
EKG R AXIS: 40 DEGREES
EKG T AXIS: 30 DEGREES
EKG T AXIS: 36 DEGREES
EKG T AXIS: 42 DEGREES
EKG VENTRICULAR RATE: 116 BPM
EKG VENTRICULAR RATE: 127 BPM
EKG VENTRICULAR RATE: 71 BPM
GFR AFRICAN AMERICAN: 41 ML/MIN
GFR NON-AFRICAN AMERICAN: 34 ML/MIN
GFR SERPL CREATININE-BSD FRML MDRD: ABNORMAL ML/MIN/{1.73_M2}
GFR SERPL CREATININE-BSD FRML MDRD: ABNORMAL ML/MIN/{1.73_M2}
GLUCOSE BLD-MCNC: 116 MG/DL (ref 70–99)
MAGNESIUM: 2.1 MG/DL (ref 1.6–2.6)
POTASSIUM SERPL-SCNC: 4.4 MMOL/L (ref 3.7–5.3)
SODIUM BLD-SCNC: 135 MMOL/L (ref 135–144)
TSH SERPL DL<=0.05 MIU/L-ACNC: 3.48 MIU/L (ref 0.3–5)
VANCOMYCIN TROUGH DATE LAST DOSE: ABNORMAL
VANCOMYCIN TROUGH DOSE AMOUNT: ABNORMAL
VANCOMYCIN TROUGH TIME LAST DOSE: 2225
VANCOMYCIN TROUGH: 26.2 UG/ML (ref 10–20)

## 2021-02-09 PROCEDURE — 6370000000 HC RX 637 (ALT 250 FOR IP): Performed by: INTERNAL MEDICINE

## 2021-02-09 PROCEDURE — 6360000002 HC RX W HCPCS: Performed by: INTERNAL MEDICINE

## 2021-02-09 PROCEDURE — 1200000000 HC SEMI PRIVATE

## 2021-02-09 PROCEDURE — 93010 ELECTROCARDIOGRAM REPORT: CPT | Performed by: INTERNAL MEDICINE

## 2021-02-09 PROCEDURE — 99254 IP/OBS CNSLTJ NEW/EST MOD 60: CPT | Performed by: FAMILY MEDICINE

## 2021-02-09 PROCEDURE — 93005 ELECTROCARDIOGRAM TRACING: CPT | Performed by: FAMILY MEDICINE

## 2021-02-09 PROCEDURE — 36415 COLL VENOUS BLD VENIPUNCTURE: CPT

## 2021-02-09 PROCEDURE — 83735 ASSAY OF MAGNESIUM: CPT

## 2021-02-09 PROCEDURE — 80048 BASIC METABOLIC PNL TOTAL CA: CPT

## 2021-02-09 PROCEDURE — 2580000003 HC RX 258: Performed by: INTERNAL MEDICINE

## 2021-02-09 PROCEDURE — 84443 ASSAY THYROID STIM HORMONE: CPT

## 2021-02-09 PROCEDURE — 80202 ASSAY OF VANCOMYCIN: CPT

## 2021-02-09 PROCEDURE — 6370000000 HC RX 637 (ALT 250 FOR IP): Performed by: FAMILY MEDICINE

## 2021-02-09 PROCEDURE — 94761 N-INVAS EAR/PLS OXIMETRY MLT: CPT

## 2021-02-09 RX ORDER — DILTIAZEM HYDROCHLORIDE 120 MG/1
120 CAPSULE, COATED, EXTENDED RELEASE ORAL DAILY
Status: DISCONTINUED | OUTPATIENT
Start: 2021-02-09 | End: 2021-02-10 | Stop reason: HOSPADM

## 2021-02-09 RX ORDER — AMIODARONE HYDROCHLORIDE 200 MG/1
200 TABLET ORAL 2 TIMES DAILY
Status: DISCONTINUED | OUTPATIENT
Start: 2021-02-09 | End: 2021-02-10 | Stop reason: HOSPADM

## 2021-02-09 RX ADMIN — ROPINIROLE 0.5 MG: 0.5 TABLET, FILM COATED ORAL at 09:30

## 2021-02-09 RX ADMIN — PIPERACILLIN SODIUM AND TAZOBACTAM SODIUM 3375 MG: 3; .375 INJECTION, POWDER, LYOPHILIZED, FOR SOLUTION INTRAVENOUS at 01:27

## 2021-02-09 RX ADMIN — PANTOPRAZOLE SODIUM 40 MG: 40 TABLET, DELAYED RELEASE ORAL at 06:45

## 2021-02-09 RX ADMIN — PIPERACILLIN SODIUM AND TAZOBACTAM SODIUM 3375 MG: 3; .375 INJECTION, POWDER, LYOPHILIZED, FOR SOLUTION INTRAVENOUS at 10:47

## 2021-02-09 RX ADMIN — SPIRONOLACTONE 25 MG: 25 TABLET, FILM COATED ORAL at 09:30

## 2021-02-09 RX ADMIN — AMITRIPTYLINE HYDROCHLORIDE 100 MG: 25 TABLET, FILM COATED ORAL at 20:11

## 2021-02-09 RX ADMIN — ROPINIROLE 0.5 MG: 0.5 TABLET, FILM COATED ORAL at 15:03

## 2021-02-09 RX ADMIN — PIPERACILLIN SODIUM AND TAZOBACTAM SODIUM 3375 MG: 3; .375 INJECTION, POWDER, LYOPHILIZED, FOR SOLUTION INTRAVENOUS at 18:00

## 2021-02-09 RX ADMIN — ACETAMINOPHEN 650 MG: 325 TABLET, FILM COATED ORAL at 06:47

## 2021-02-09 RX ADMIN — FUROSEMIDE 20 MG: 20 TABLET ORAL at 09:30

## 2021-02-09 RX ADMIN — ACETAMINOPHEN 650 MG: 325 TABLET, FILM COATED ORAL at 15:02

## 2021-02-09 RX ADMIN — Medication 1 CAPSULE: at 09:33

## 2021-02-09 RX ADMIN — AMIODARONE HYDROCHLORIDE 200 MG: 200 TABLET ORAL at 09:30

## 2021-02-09 RX ADMIN — AMIODARONE HYDROCHLORIDE 200 MG: 200 TABLET ORAL at 20:11

## 2021-02-09 RX ADMIN — HYDROCODONE BITARTRATE AND ACETAMINOPHEN 1 TABLET: 5; 325 TABLET ORAL at 20:11

## 2021-02-09 RX ADMIN — SPIRONOLACTONE 25 MG: 25 TABLET, FILM COATED ORAL at 20:11

## 2021-02-09 RX ADMIN — APIXABAN 5 MG: 5 TABLET, FILM COATED ORAL at 09:30

## 2021-02-09 RX ADMIN — ACETAMINOPHEN 650 MG: 325 TABLET, FILM COATED ORAL at 23:18

## 2021-02-09 RX ADMIN — VANCOMYCIN HYDROCHLORIDE 1250 MG: 750 INJECTION, POWDER, LYOPHILIZED, FOR SOLUTION INTRAVENOUS at 23:12

## 2021-02-09 RX ADMIN — Medication 1 CAPSULE: at 17:29

## 2021-02-09 RX ADMIN — DILTIAZEM HYDROCHLORIDE 120 MG: 120 CAPSULE, COATED, EXTENDED RELEASE ORAL at 09:30

## 2021-02-09 RX ADMIN — APIXABAN 5 MG: 5 TABLET, FILM COATED ORAL at 20:11

## 2021-02-09 RX ADMIN — ROPINIROLE 0.5 MG: 0.5 TABLET, FILM COATED ORAL at 20:11

## 2021-02-09 RX ADMIN — METOPROLOL SUCCINATE 50 MG: 50 TABLET, EXTENDED RELEASE ORAL at 09:30

## 2021-02-09 ASSESSMENT — PAIN DESCRIPTION - PAIN TYPE: TYPE: ACUTE PAIN

## 2021-02-09 ASSESSMENT — PAIN SCALES - GENERAL
PAINLEVEL_OUTOF10: 3
PAINLEVEL_OUTOF10: 6
PAINLEVEL_OUTOF10: 6
PAINLEVEL_OUTOF10: 3
PAINLEVEL_OUTOF10: 2

## 2021-02-09 ASSESSMENT — PAIN DESCRIPTION - ORIENTATION: ORIENTATION: RIGHT;LEFT

## 2021-02-09 NOTE — PROGRESS NOTES
Pt resting in bed. Remains on monitor in RSR in the 60's. Right lower leg remains edematous, but redness and edema has decreased greatly since admission. Pt states that part of her lower leg is always reddened. IV amiodarone infusing without difficulty. Pt educated on plan of care for the day with verbalized understanding.

## 2021-02-09 NOTE — PROGRESS NOTES
Dr. Bay Donaldson updated on pt status. Per MD, keep pt on amiodarone drip over night and will assess again in the morning.

## 2021-02-09 NOTE — CONSULTS
Patient: Rodney Cooney  : 1960  Date of Admission: 2021  Primary Care Physician: Roma Dickson  Today's Date: 2021    REASON FOR CONSULTATION: Cellulitis (\"been to ED on  for UTI, Joshua Malagon sent me to ED on Tuesday for cellulits of right leg. They wanted to admit me then but I thought I would try antibiotics at home. The leg has not gotten any better. \")      HPI: Ms. Kingston Abreu is a 61 y.o. female who was admitted to the hospital with a several day history of worsening lower extremity edema as redness and evidence of cellulitis which had been unresponsive to outpatient treatment. She reports this has been a chronic problem for at least 3-4 years. 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 She reports a history of a DVT in her left leg but not her leg. She reports a history of a PE but denies any known history of vascular disease in her legs including arterial disease or lymphedema. She says she has worn compression stockings in the past but says these were very uncomfortable for her. Finally she reports a known history of \"SVT\" in 2019 for which she was started on Flecainide with good control of her symptoms. She says when she goes into the SVT she can feel the palpitations but denies any actual chest pain, shortness of breath or lightheadedness or dizziness. Ms. Kingston Abreu also denied any current or recent chest pain, abdominal pain, bleeding problems, problems with her medications or any other concerns at this time.      Past Medical History:   Diagnosis Date    Acid reflux     Bleeding tendency (HCC)     Blood type O+     Bronchitis     Chronic back pain     Depression     Diverticulitis     DVT (deep venous thrombosis) ([facility] [address]     Left leg    Fibromyalgia     Hypertension     Internal hemorrhoid     Left thyroid nodule 2013    Nausea & vomiting     Pulmonary embolism (HCC)        CURRENT ALLERGIES: Ampicillin and Sulfa antibiotics REVIEW OF SYSTEMS: 14 systems were reviewed. Pertinent positives and negatives as above, all else negative.      Past Surgical History:   Procedure Laterality Date    CHOLECYSTECTOMY  08/22/2013    laparoscopic    COLONOSCOPY      2012/ Dr. Jonnie Isidro Right     TOTAL KNEE ARTHROPLASTY Left     Social History:  Social History     Tobacco Use    Smoking status: Never Smoker    Smokeless tobacco: Never Used   Substance Use Topics    Alcohol use: No     Alcohol/week: 0.0 standard drinks     Comment: once in 10 years may have a glass wine    Drug use: No        CURRENT MEDICATIONS:  Outpatient Medications Marked as Taking for the 2/5/21 encounter [facility] Encounter)   Medication Sig Dispense Refill    spironolactone (ALDACTONE) 25 MG tablet Take 25 mg by mouth 2 times daily      clindamycin (CLEOCIN) 300 MG capsule Take 1 capsule by mouth 3 times daily for 10 days 30 capsule 0    apixaban (ELIQUIS) 5 MG TABS tablet Take 1 tablet by mouth 2 times daily 180 tablet 3    omeprazole (PRILOSEC) 20 MG delayed release capsule Take 1 capsule by mouth daily 90 capsule 1    celecoxib (CELEBREX) 100 MG capsule Take 1 capsule by mouth 2 times daily 180 capsule 1    furosemide (LASIX) 20 MG tablet Take 1 tablet by mouth daily 30 tablet 11    rOPINIRole (REQUIP) 0.5 MG tablet Take 1 tablet by mouth 3 times daily 90 tablet 2    amitriptyline (ELAVIL) 100 MG tablet Take 1 tablet by mouth nightly 90 tablet 1    flecainide (TAMBOCOR) 50 MG tablet Take 1 tablet by mouth 2 times daily 60 tablet 11    metoprolol succinate (TOPROL XL) 50 MG extended release tablet Take 1 tablet by mouth daily 90 tablet 3           amiodarone (CORDARONE) tablet 200 mg, BID      dilTIAZem (CARDIZEM CD) extended release capsule 120 mg, Daily      vancomycin (VANCOCIN) 1,250 mg in dextrose 5 % 250 mL IVPB, Q24H     HYDROcodone-acetaminophen (NORCO) 5-325 MG per tablet 1 tablet, Q6H PRN      metoprolol (LOPRESSOR) injection 5 mg, Q6H PRN      lactobacillus (CULTURELLE) capsule 1 capsule, BID WC      amitriptyline (ELAVIL) tablet 100 mg, Nightly      apixaban (ELIQUIS) tablet 5 mg, BID      furosemide (LASIX) tablet 20 mg, Daily      metoprolol succinate (TOPROL XL) extended release tablet 50 mg, Daily      pantoprazole (PROTONIX) tablet 40 mg, QAM AC      rOPINIRole (REQUIP) tablet 0.5 mg, TID      spironolactone (ALDACTONE) tablet 25 mg, BID      sodium chloride flush 0.9 % injection 10 mL, 2 times per day      sodium chloride flush 0.9 % injection 10 mL, PRN      promethazine (PHENERGAN) tablet 12.5 mg, Q6H PRN    Or      ondansetron (ZOFRAN) injection 4 mg, Q6H PRN      polyethylene glycol (GLYCOLAX) packet 17 g, Daily PRN      acetaminophen (TYLENOL) tablet 650 mg, Q6H PRN    Or      acetaminophen (TYLENOL) suppository 650 mg, Q6H PRN      piperacillin-tazobactam (ZOSYN) 3,375 mg in dextrose 5 % 50 mL IVPB extended infusion (mini-bag), Q8H      vancomycin (VANCOCIN) intermittent dosing (placeholder), RX Placeholder         FAMILY HISTORY: family history includes Atrial Fibrillation in her brother and mother; Diabetes in her sister; Stroke in her father; Thyroid Disease in her brother. PHYSICAL EXAM:   /66   Pulse 66   Temp 97.9 °F (36.6 °C) (Oral)   Resp 18   Ht 5' 7\" (1.702 m)   Wt (!) 362 lb 8 oz (164.4 kg)   SpO2 98%   BMI 56.78 kg/m²  Body mass index is 56.78 kg/m². [ INSTRUCTIONS:  \"[x]\" Indicates a positive item  \"[]\" Indicates a negative item   Vital Signs: (As obtained by patient/caregiver or practitioner observation)  No flowsheet data found.      Constitutional: [x] Appears well-developed and well-nourished [x] No apparent distress      [] Abnormal-   Mental status  [x] Alert and awake  [x] Oriented to person/place/time [x]Able to follow commands      Eyes:  EOM    [x]  Normal [] Abnormal-  Sclera  [x]  Normal  [] Abnormal -         Discharge [x]  None visible  [] Abnormal -    HENT:   [x] Normocephalic, atraumatic.   [] Abnormal   [] Mouth/Throat: Mucous membranes are moist.     External Ears [x] Normal  [] Abnormal-     Neck: [x] No visualized mass     Pulmonary/Chest: [x] Respiratory effort normal.  [x] No visualized signs of difficulty breathing or respiratory distress        [] Abnormal-     Neurological:        [x] No Facial Asymmetry (Cranial nerve 7 motor function) (limited exam to video visit)          [] No gaze palsy        [] Abnormal-         Skin:        [x] No significant exanthematous lesions or discoloration noted on facial skin         [] Abnormal-            Psychiatric:       [x] Normal Affect [] No Hallucinations        [] Abnormal-     Other pertinent observable physical exam findings: None         MOST RECENT LABS ON RECORD:   Lab Results   Component Value Date    WBC 7.8 02/08/2021    HGB 12.0 02/08/2021    HCT 36.1 02/08/2021     02/08/2021    CHOL 176 08/28/2020    TRIG 138 08/28/2020    HDL 46 08/28/2020    LDLCHOLESTEROL 102 08/28/2020    ALT 23 02/05/2021    AST 19 02/05/2021     02/09/2021    K 4.4 02/09/2021     02/09/2021    CREATININE 1.56 (H) 02/09/2021    BUN 15 02/09/2021    CO2 27 02/09/2021    TSH 3.48 02/09/2021    INR 1.3 10/25/2020    LABA1C 6.1 10/27/2020        ASSESSMENT:  Patient Active Problem List    Diagnosis Date Noted    Cellulitis of leg, right 02/05/2021    RLS (restless legs syndrome) 01/21/2021    Mixed incontinence 12/10/2020    Constipation 12/10/2020    Acute cystitis without hematuria 12/10/2020    Polyarthritis 01/03/2019    Gastroesophageal reflux disease without esophagitis 07/05/2018    History of total hip replacement 11/01/2016    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    Shortness of breath 03/03/2016    Pulmonary hypertension associated with unclear multi-factorial mechanisms ([facility] [address] 03/03/2016    Morbid obesity due to excess calories ([facility] [address] 03/03/2016    MAYURI (obstructive sleep apnea) 03/03/2016    Essential hypertension 03/03/2016    Chronic back pain greater than 3 months duration 02/15/2016    Right hip pain 01/18/2016    Bilateral leg edema 01/18/2016    Chronic fatigue 01/18/2016    Left thyroid nodule 01/18/2016    S/P laparoscopic cholecystectomy 92/92/4019    Follicular adenoma of thyroid gland 08/21/2013    Fibromyalgia        PLAN:    · Paroxysmal Supraventricular Tachycardia (SVT), I suspect that this is AVNRT: Rhythm Control Symptomatic   Beta Blocker: Continue Metoprolol succinate (Toprol XL) 50 mg daily.  Calcium Channel Blocker: DECREASE to diltiazem CD (Cardizem CD) 120 mg once daily.  Anti-Arrhythmic: Stop IV amiodarone and switch to amiodarone (Pacerone) 200 mg bid x 2 weeks then stop and will likely switch back to flecainide once she is over this acute cellulitis exacerbation  [id number] Score for Atrial Fibrillation Stroke Risk   Risk   Factors  Component Value   C CHF No 0   H HTN Yes 1   A2 Age >= 76 No,  (57 y.o.) 0   D DM No 0   S2 Prior Stroke/TIA No 0   V Vascular Disease No 0   A Age 74-69 No,  (57 y.o.) 0   Sc Sex female 1    [id number]  Score  2   Score last updated 0/9/66 2:47 PM EST  Click here for a link to the UpToDate guideline \"Atrial Fibrillation: Anticoagulation therapy to prevent embolization    Disclaimer: Risk Score calculation is dependent on accuracy of patient problem list and past encounter diagnosis.  Stroke Risk: CHADS2-VASc Score: 2/9 (2.2% stroke risk)   Anticoagulation: Continue Apixaban (Eliquis) 5 mg every 12 hours.  Additional Testing List: None     · Chronic lower extremity edema of unclear etiology with current right lower extremity cellulitis: Improving with current care. Suspect chronic venous disease and possible postphlebitic syndrome related to history of DVT's and possibly related to history of avascular necrosis of right hip.   · Continue with lasix and aldactone  · Elevation and salt restriction  · Had a long talk about compression stockings vs wraps and she was in agreement with starting ACE wraps now. Once again, thank you for allowing me to participate in this patients care. Please do not hesitate to contact me if I could be of any further assistance. Instructed her to follow up with Dr. Bry Bruner in 2-3 weeks. Will let him know about her course in hospital [facility]. Sincerely,  Hiro Chavez. Angel DUFFY, MS, F.A.C.C. [facility] Specialist     [facility], [address] [place] [address]  Phone: [phone], Fax: [phone]     I believe that the risk of significant morbidity and mortality related to the patient's current medical conditions are: intermediate-highEvelyn Mejia is a 61 y.o. female being evaluated by a Virtual Visit (video visit) encounter to address concerns as mentioned above. A caregiver was present when appropriate. Due to this being a TeleHealth encounter (During [facility]- public health emergency), evaluation of the following organ systems was limited: Vitals/Constitutional/EENT/Resp/CV/GI//MS/Neuro/Skin/Heme-Lymph-Imm. Pursuant to the emergency declaration under the [address] [place] [address], [address] [place] [address] authority and the [employer] Act, this Virtual Visit was conducted with patient's (and/or legal guardian's) consent, to reduce the patient's risk of exposure to COVID-19 and provide necessary medical care. The patient (and/or legal guardian) has also been advised to contact this office for worsening conditions or problems, and seek emergency medical treatment and/or call 911 if deemed necessary. Services were provided through a video synchronous discussion virtually to substitute for in-person visit.      Ms. Erwin Odom was located in the patients hospital room in [address] [place] [address]  performed the visit from my office in [facility] in [facility], Ruthie Llanes MD on 2/9/2021 at 5:20 PM    An electronic signature was used to authenticate this note.         February 9, 2021

## 2021-02-09 NOTE — PROGRESS NOTES
Dr. Mali Caballero called after labs were resulted. Orders obtained to add oral amiodarone, decreased po cardizem and to discontinue IV amiodarone obtained. Pt informed on updates.

## 2021-02-09 NOTE — PROGRESS NOTES
Dr. Cory Murray cantacted per request of Dr. Shirlene Whitney for HR control. Dr. Cory Murray updated on pt status and meds that have been given. New order for amiodarone drip at 1 mg/min.

## 2021-02-09 NOTE — PLAN OF CARE
Problem: Discharge Planning:  Goal: Discharged to appropriate level of care  Description: Discharged to appropriate level of care  Outcome: Ongoing     Problem: Mobility - Impaired:  Goal: Mobility will improve to maximum level  Description: Mobility will improve to maximum level  Outcome: Ongoing     Problem: Pain:  Goal: Pain level will decrease  Description: Pain level will decrease  Outcome: Ongoing  Goal: Control of acute pain  Description: Control of acute pain  Outcome: Ongoing  Goal: Control of chronic pain  Description: Control of chronic pain  Outcome: Ongoing     Problem: Infection:  Goal: Will remain free from infection  Description: Will remain free from infection  Outcome: Ongoing     Problem: Daily Care:  Goal: Daily care needs are met  Description: Daily care needs are met  Outcome: Ongoing     Problem: Pain:  Goal: Pain level will decrease  Description: Pain level will decrease  Outcome: Ongoing  Goal: Control of acute pain  Description: Control of acute pain  Outcome: Ongoing     Problem: Nutritional:  Goal: Ability to chew and swallow food without choking will improve  Description: Ability to chew and swallow food without choking will improve  Outcome: Ongoing

## 2021-02-09 NOTE — PROGRESS NOTES
Amiodarone drip stopped. Pt up to bathroom. Heart rate was 60-70's prior to ambulation and bath. Heart rate increased to 90's while doing am care then decreased to 70's after sitting in chair.

## 2021-02-09 NOTE — PROGRESS NOTES
Hospitalist Progress Note  2/9/2021 7:14 AM  Subjective:   Admit Date: 2/5/2021  PCP: Roma Dickson, ASHLEY - CNP    Interval History:     Yesterday the patient developed episodes of tachycardia yesterday with heart rate in 120s-130s. She was treated with multiple doses of IV Lopressor and also IV Cardizem and unfortunately remained tachycardic. She had no chest pain or shortness of breath however did feel palpitations. I asked the nursing staff to contact covering cardiologist Dr. Gary Tracy. He recommended starting the patient on amiodarone drip. She was moved to ICU and her heart rate stabilized with amiodarone drip, this morning heart rate is in 60s. She states that she feels better this morning. She also states that her diarrhea that she had yesterday is improving. Has no abdominal pain, no nausea, no vomiting          Diet: DIET CARDIAC;  Medications:   Scheduled Meds:   lactobacillus  1 capsule Oral BID WC    vancomycin  1,250 mg Intravenous Q12H    amitriptyline  100 mg Oral Nightly    apixaban  5 mg Oral BID    dilTIAZem  240 mg Oral Daily    furosemide  20 mg Oral Daily    metoprolol succinate  50 mg Oral Daily    pantoprazole  40 mg Oral QAM AC    rOPINIRole  0.5 mg Oral TID    spironolactone  25 mg Oral BID    sodium chloride flush  10 mL Intravenous 2 times per day    piperacillin-tazobactam  3,375 mg Intravenous Q8H    vancomycin (VANCOCIN) intermittent dosing (placeholder)   Other RX Placeholder     Continuous Infusions:   amiodarone 0.5 mg/min (02/09/21 0507)     PRN Medications: HYDROcodone 5 mg - acetaminophen, metoprolol, sodium chloride flush, promethazine **OR** ondansetron, polyethylene glycol, acetaminophen **OR** acetaminophen    Objective:   Vitals: /72   Pulse 58   Temp 98.3 °F (36.8 °C) (Oral)   Resp 14   Ht 5' 7\" (1.702 m)   Wt (!) 362 lb 8 oz (164.4 kg)   SpO2 96%   BMI 56.78 kg/m²   BMI: Body mass index is 56.78 kg/m².     CBC:   Recent Labs 02/08/21  0618   WBC 7.8   HGB 12.0        BMP:    Recent Labs     02/08/21  0618      K 4.1      CO2 26   BUN 12   CREATININE 1.35*   GLUCOSE 106*       Physical Exam:    General Appearance: alert and oriented to person, place and time, in no acute distress  Cardiovascular: normal rate, regular rhythm, normal S1 and S2  Pulmonary/Chest: clear to auscultation bilaterally- no wheezes, rales or rhonchi, normal air movement, no respiratory distress  Abdomen: soft, non-tender, non-distended, normal bowel sounds, no masses   Extremities: Right lower extremity with extensive redness in the lower part, redness improving   neurological: alert, oriented, normal speech, no focal findings or movement disorder noted      Assessment and Plan:          1. Right lower extremity extensive cellulitis- failed outpatient antibiotics, on empiric IV Zosyn and vancomycin. Blood cultures show no growth. Clinically improved. Planning on discharging home with oral antibiotics  2. Tachycardia, history of SVT -patient did not respond to multiple doses of IV Lopressor and Cardizem and was moved to ICU and started on amiodarone drip per Dr. Mendes Poster recommendations. This morning her heart rate is stable. Will await further input from cardiology. May need to transition to oral Cardizem, Toprol and flecainide. Greatly appreciate Dr. Mendes Poster help  3. CKD stage 3 -close to baseline  4  History of DVT/PE -on Eliquis  5. Obstructive sleep apnea -on CPAP  6. Morbid obesity with BMI 56.9  7. GERD  -on Protonix  8. Hypertension -blood pressure is stable  9.   Restless leg syndrome -on Requip      Patient continues to require inpatient admission related to need for IV antibiotics, amiodarone drip, cardiology evaluation      Electronically signed by Mary Archer MD on 2/9/2021 at 7:14 AM    Rounding Hospitalist

## 2021-02-10 VITALS
SYSTOLIC BLOOD PRESSURE: 126 MMHG | TEMPERATURE: 98 F | RESPIRATION RATE: 16 BRPM | HEART RATE: 62 BPM | DIASTOLIC BLOOD PRESSURE: 71 MMHG | HEIGHT: 67 IN | OXYGEN SATURATION: 92 % | BODY MASS INDEX: 45.99 KG/M2 | WEIGHT: 293 LBS

## 2021-02-10 PROCEDURE — 2580000003 HC RX 258: Performed by: INTERNAL MEDICINE

## 2021-02-10 PROCEDURE — 6370000000 HC RX 637 (ALT 250 FOR IP): Performed by: INTERNAL MEDICINE

## 2021-02-10 PROCEDURE — 6370000000 HC RX 637 (ALT 250 FOR IP): Performed by: FAMILY MEDICINE

## 2021-02-10 PROCEDURE — 6360000002 HC RX W HCPCS: Performed by: INTERNAL MEDICINE

## 2021-02-10 PROCEDURE — 94761 N-INVAS EAR/PLS OXIMETRY MLT: CPT

## 2021-02-10 RX ORDER — DOXYCYCLINE HYCLATE 100 MG/1
100 CAPSULE ORAL 2 TIMES DAILY
Qty: 6 CAPSULE | Refills: 0 | Status: SHIPPED | OUTPATIENT
Start: 2021-02-10 | End: 2021-02-13

## 2021-02-10 RX ORDER — AMIODARONE HYDROCHLORIDE 200 MG/1
200 TABLET ORAL 2 TIMES DAILY
Qty: 60 TABLET | Refills: 1 | Status: SHIPPED | OUTPATIENT
Start: 2021-02-10 | End: 2021-03-04 | Stop reason: ALTCHOICE

## 2021-02-10 RX ORDER — LACTOBACILLUS RHAMNOSUS GG 10B CELL
1 CAPSULE ORAL 2 TIMES DAILY WITH MEALS
Qty: 60 CAPSULE | Refills: 0 | Status: SHIPPED | OUTPATIENT
Start: 2021-02-10 | End: 2021-05-19 | Stop reason: ALTCHOICE

## 2021-02-10 RX ORDER — DILTIAZEM HYDROCHLORIDE 120 MG/1
120 CAPSULE, COATED, EXTENDED RELEASE ORAL DAILY
Qty: 30 CAPSULE | Refills: 3 | Status: SHIPPED | OUTPATIENT
Start: 2021-02-11 | End: 2021-06-07 | Stop reason: SDUPTHER

## 2021-02-10 RX ADMIN — DILTIAZEM HYDROCHLORIDE 120 MG: 120 CAPSULE, COATED, EXTENDED RELEASE ORAL at 08:10

## 2021-02-10 RX ADMIN — ROPINIROLE 0.5 MG: 0.5 TABLET, FILM COATED ORAL at 08:10

## 2021-02-10 RX ADMIN — HYDROCODONE BITARTRATE AND ACETAMINOPHEN 1 TABLET: 5; 325 TABLET ORAL at 08:17

## 2021-02-10 RX ADMIN — SPIRONOLACTONE 25 MG: 25 TABLET, FILM COATED ORAL at 08:10

## 2021-02-10 RX ADMIN — SODIUM CHLORIDE, PRESERVATIVE FREE 10 ML: 5 INJECTION INTRAVENOUS at 08:10

## 2021-02-10 RX ADMIN — PANTOPRAZOLE SODIUM 40 MG: 40 TABLET, DELAYED RELEASE ORAL at 04:55

## 2021-02-10 RX ADMIN — PIPERACILLIN SODIUM AND TAZOBACTAM SODIUM 3375 MG: 3; .375 INJECTION, POWDER, LYOPHILIZED, FOR SOLUTION INTRAVENOUS at 01:30

## 2021-02-10 RX ADMIN — Medication 1 CAPSULE: at 08:10

## 2021-02-10 RX ADMIN — METOPROLOL SUCCINATE 50 MG: 50 TABLET, EXTENDED RELEASE ORAL at 08:10

## 2021-02-10 RX ADMIN — APIXABAN 5 MG: 5 TABLET, FILM COATED ORAL at 08:10

## 2021-02-10 RX ADMIN — AMIODARONE HYDROCHLORIDE 200 MG: 200 TABLET ORAL at 08:10

## 2021-02-10 RX ADMIN — FUROSEMIDE 20 MG: 20 TABLET ORAL at 08:10

## 2021-02-10 RX ADMIN — PIPERACILLIN SODIUM AND TAZOBACTAM SODIUM 3375 MG: 3; .375 INJECTION, POWDER, LYOPHILIZED, FOR SOLUTION INTRAVENOUS at 08:10

## 2021-02-10 RX ADMIN — ACETAMINOPHEN 650 MG: 325 TABLET, FILM COATED ORAL at 05:03

## 2021-02-10 ASSESSMENT — PAIN SCALES - GENERAL
PAINLEVEL_OUTOF10: 2
PAINLEVEL_OUTOF10: 5
PAINLEVEL_OUTOF10: 5

## 2021-02-10 NOTE — DISCHARGE SUMMARY
Hospitalist Discharge Summary    Patient:  Jourdan Allred  YOB: 1960    MRN: [id number]   Acct: [de-identified]    Primary Care Physician: ASHLEY Daily CNP    Admit date:  2/5/2021    Discharge date:  2/10/2021       Discharge Diagnoses:     1. RLE extensive cellulitis  2. Paroxysmal SVT  3.  CKD stage III  4. History of DVT/PE, anticoagulated with Eliquis  5. Obstructive sleep apnea, on CPAP  6. GERD  7. Hypertension  8. Morbid obesity  9. Restless leg syndrome  10.   Polyarthritis      Discharge Medications:       Mónica Robbins   Home Medication Instructions IVT:[id number]    Printed on:02/10/21 1232   Medication Information                      amiodarone (CORDARONE) 200 MG tablet  Take 1 tablet by mouth 2 times daily             amitriptyline (ELAVIL) 100 MG tablet  Take 1 tablet by mouth nightly             apixaban (ELIQUIS) 5 MG TABS tablet  Take 1 tablet by mouth 2 times daily             celecoxib (CELEBREX) 100 MG capsule  Take 1 capsule by mouth 2 times daily             dilTIAZem (CARDIZEM CD) 120 MG extended release capsule  Take 1 capsule by mouth daily             doxycycline hyclate (VIBRAMYCIN) 100 MG capsule  Take 1 capsule by mouth 2 times daily for 3 days             Elastic Bandages & Supports (MEDICAL COMPRESSION STOCKINGS) MISC  1 each by Does not apply route daily Knee high 20mmHg             furosemide (LASIX) 20 MG tablet  Take 1 tablet by mouth daily             lactobacillus (CULTURELLE) capsule  Take 1 capsule by mouth 2 times daily (with meals)             metoprolol succinate (TOPROL XL) 50 MG extended release tablet  Take 1 tablet by mouth daily             omeprazole (PRILOSEC) 20 MG delayed release capsule  Take 1 capsule by mouth daily             rOPINIRole (REQUIP) 0.5 MG tablet  Take 1 tablet by mouth 3 times daily             spironolactone (ALDACTONE) 25 MG tablet  Take 25 mg by mouth 2 times daily                 Diet:  DIET CARDIAC; Activity: Resume prehospital activities    Follow-up:  in 1 weeks with ASHLEY Rodriguez CNP,  in about 2 weeks with Dr. Jatin Silverman    Consultants: Cardiology     Diagnostic Test:      CBC:   Recent Labs     02/08/21  0618   WBC 7.8   HGB 12.0        BMP:    Recent Labs     02/09/21  0743      K 4.4      CO2 27   BUN 15   CREATININE 1.56*   GLUCOSE 116*     Calcium:  Recent Labs     02/09/21  0743   CALCIUM 9.8         Physical Exam:    Vitals:  Patient Vitals for the past 24 hrs:   BP Temp Temp src Pulse Resp SpO2   02/10/21 0944 -- -- -- -- 16 92 %   02/10/21 0800 126/71 98 °F (36.7 °C) Oral 62 16 96 %   02/10/21 0621 -- -- -- -- -- 98 %   02/10/21 0459 127/66 97.6 °F (36.4 °C) Oral 61 18 100 %   02/09/21 2153 -- -- -- -- -- 99 %   02/09/21 2009 120/75 97.9 °F (36.6 °C) Oral 65 18 99 %   02/09/21 1509 121/66 97.9 °F (36.6 °C) Oral 66 18 98 %   02/09/21 1502 -- -- -- 62 18 --   02/09/21 1400 -- -- -- 61 20 --     Weight: Weight: (!) 362 lb 8 oz (164.4 kg)       General Appearance: alert and oriented to person, place and time, in no acute distress  Cardiovascular: normal rate, regular rhythm, normal S1 and S2  Pulmonary/Chest: clear to auscultation bilaterally- no wheezes, rales or rhonchi, normal air movement, no respiratory distress  Abdomen: soft, non-tender, non-distended, normal bowel sounds, no masses   Extremities: Right lower extremity in Ace wrap, redness significantly improved  neurological: alert, oriented, normal speech, no focal findings or movement disorder noted      Hospital Course: The patient is a 80-year-old woman who presented to the emergency room complaining of worsening swelling and redness in the right lower extremity. She was treated with oral antibiotics outpatient twice and did not improve. She reported no recent injury to her leg. She had no associated fevers or chills, no nausea or vomiting.   With worsening redness and swelling she presented to the emergency room for evaluation. In the ER her CMP was normal other than a sodium of 134, creatinine of 1.34, and Alk Phos of 111. CBC showed a WBC of 7.9, Hgb 12.5, Plt  Ct of 302. D dimer was 1.06.  A venous US was negative for DVT. COVID 19 was negative. She was started on IV and Zosyn empirically. Had Ace wrap's applied to her lower extremities to decrease edema. We continued on her previous home medications. She was clinically improving and we were about to discharge her home when she was noted to have episodes of tachycardia with heart rate in 120s and 130s. She had no associated chest pain, shortness of breath or dizziness, however did have palpitations. After several doses of IV Lopressor and Cardizem her heart rate did not improve and we consulted cardiologist  who was covering for Dr. Peyton Jacob this week. He advised to start the patient on amiodarone drip. Her heart rate improved with amnio drip and she was transitioned to oral amiodarone per 's recommendation. Her oral Cardizem was decreased from 240 mg to 120 mg/day. We stopped flecainide. We did continue Toprol-XL 50 mg/day. She is on anticoagulation with Eliquis. This morning the patient continues to be stable with heart rate in 60s, stable blood pressure. Swelling and redness in the right lower extremity improved and I feel we can transition her to oral doxycycline for 3 more days. Since she is medically stable, she will be discharged home. She is to continue on amiodarone 200 mg twice daily, Cardizem  mg/day, Toprol-XL 50 mg/day, stop flecainide. She is advised to monitor her heart rate at home and contact physician or come to the emergency room if heart rate below 50 or if she develops dizziness, weakness, syncope, palpitations, chest pain, shortness of breath or any other abnormal symptoms. She is also advised to follow-up with Dr. Peyton Jacob in about 2 weeks.         Disposition: home    Condition: Stable    Time Spent: 34 minutes      Electronically signed by Luke Patrick MD on 2/10/2021 at 12:32 PM  Discharging Hospitalist

## 2021-02-10 NOTE — PROGRESS NOTES
Discharge instructions given, demonstrates understanding, no questions at this time. Out to private vehicle with wheelchair.

## 2021-02-11 ENCOUNTER — TELEPHONE (OUTPATIENT)
Dept: PRIMARY CARE CLINIC | Age: 61
End: 2021-02-11

## 2021-02-11 NOTE — TELEPHONE ENCOUNTER
[facility] Transitions Initial Follow Up Call    Outreach made within 2 business days of discharge: Yes    Patient: Willa Dao Patient : 1960   MRN: [id number]  Reason for Admission: There are no discharge diagnoses documented for the most recent discharge. Discharge Date: 2/10/21       Spoke with: Loyda Lorenzana    Discharge department/facility: [facility]    TCM Interactive Patient Contact:  Was patient able to fill all prescriptions: Yes  Was patient instructed to bring all medications to the follow-up visit: Yes  Is patient taking all medications as directed in the discharge summary?  Yes  Does patient understand their discharge instructions: Yes  Does patient have questions or concerns that need addressed prior to 7-14 day follow up office visit: yes - 02/15/21    Scheduled appointment with PCP within 7-14 days    Follow Up  Future Appointments   Date Time Provider Kya Trimble   2/15/2021 10:20 AM ASHLEY Vaughn CNP [facility]   3/4/2021  3:00 PM Caryle Roys, MD [address]

## 2021-02-15 ENCOUNTER — TELEPHONE (OUTPATIENT)
Dept: FAMILY MEDICINE CLINIC | Age: 61
End: 2021-02-15

## 2021-02-15 ENCOUNTER — HOSPITAL ENCOUNTER (OUTPATIENT)
Age: 61
Discharge: HOME | End: 2021-02-15
Payer: COMMERCIAL

## 2021-02-15 ENCOUNTER — OFFICE VISIT (OUTPATIENT)
Dept: FAMILY MEDICINE CLINIC | Age: 61
End: 2021-02-15
Payer: COMMERCIAL

## 2021-02-15 VITALS
HEART RATE: 68 BPM | OXYGEN SATURATION: 98 % | SYSTOLIC BLOOD PRESSURE: 122 MMHG | DIASTOLIC BLOOD PRESSURE: 70 MMHG | TEMPERATURE: 97.9 F

## 2021-02-15 DIAGNOSIS — N17.9 ACUTE RENAL FAILURE SUPERIMPOSED ON STAGE 3B CHRONIC KIDNEY DISEASE, UNSPECIFIED ACUTE RENAL FAILURE TYPE (HCC): Primary | ICD-10-CM

## 2021-02-15 DIAGNOSIS — N28.9 FUNCTION KIDNEY DECREASED: Primary | ICD-10-CM

## 2021-02-15 DIAGNOSIS — N18.32 ACUTE RENAL FAILURE SUPERIMPOSED ON STAGE 3B CHRONIC KIDNEY DISEASE, UNSPECIFIED ACUTE RENAL FAILURE TYPE (HCC): Primary | ICD-10-CM

## 2021-02-15 DIAGNOSIS — L03.115 CELLULITIS OF RIGHT LEG: ICD-10-CM

## 2021-02-15 DIAGNOSIS — N18.32 ACUTE RENAL FAILURE SUPERIMPOSED ON STAGE 3B CHRONIC KIDNEY DISEASE, UNSPECIFIED ACUTE RENAL FAILURE TYPE (HCC): ICD-10-CM

## 2021-02-15 DIAGNOSIS — Z12.31 BREAST CANCER SCREENING BY MAMMOGRAM: ICD-10-CM

## 2021-02-15 DIAGNOSIS — N17.9 ACUTE RENAL FAILURE SUPERIMPOSED ON STAGE 3B CHRONIC KIDNEY DISEASE, UNSPECIFIED ACUTE RENAL FAILURE TYPE (HCC): ICD-10-CM

## 2021-02-15 LAB
ABSOLUTE EOS #: 0 K/UL (ref 0–0.4)
ABSOLUTE IMMATURE GRANULOCYTE: ABNORMAL K/UL (ref 0–0.3)
ABSOLUTE LYMPH #: 1.8 K/UL (ref 1–4.8)
ABSOLUTE MONO #: 0.6 K/UL (ref 0–1)
ALBUMIN SERPL-MCNC: 3.7 G/DL (ref 3.5–5.2)
ALBUMIN/GLOBULIN RATIO: ABNORMAL (ref 1–2.5)
ALP BLD-CCNC: 115 U/L (ref 35–104)
ALT SERPL-CCNC: 36 U/L (ref 5–33)
ANION GAP SERPL CALCULATED.3IONS-SCNC: 10 MMOL/L (ref 9–17)
AST SERPL-CCNC: 21 U/L
BASOPHILS # BLD: 0 % (ref 0–2)
BASOPHILS ABSOLUTE: 0 K/UL (ref 0–0.2)
BILIRUB SERPL-MCNC: 0.39 MG/DL (ref 0.3–1.2)
BUN BLDV-MCNC: 24 MG/DL (ref 8–23)
BUN/CREAT BLD: 15 (ref 9–20)
CALCIUM SERPL-MCNC: 10.2 MG/DL (ref 8.6–10.4)
CHLORIDE BLD-SCNC: 102 MMOL/L (ref 98–107)
CO2: 24 MMOL/L (ref 20–31)
CREAT SERPL-MCNC: 1.58 MG/DL (ref 0.5–0.9)
DIFFERENTIAL TYPE: YES
EOSINOPHILS RELATIVE PERCENT: 0 % (ref 0–5)
GFR AFRICAN AMERICAN: 40 ML/MIN
GFR NON-AFRICAN AMERICAN: 33 ML/MIN
GFR SERPL CREATININE-BSD FRML MDRD: ABNORMAL ML/MIN/{1.73_M2}
GFR SERPL CREATININE-BSD FRML MDRD: ABNORMAL ML/MIN/{1.73_M2}
GLUCOSE BLD-MCNC: 104 MG/DL (ref 70–99)
HCT VFR BLD CALC: 37.7 % (ref 36–46)
HEMOGLOBIN: 12.8 G/DL (ref 12–16)
IMMATURE GRANULOCYTES: ABNORMAL %
LACTIC ACID, WHOLE BLOOD: NORMAL MMOL/L (ref 0.7–2.1)
LACTIC ACID: 1.3 MMOL/L (ref 0.5–2.2)
LYMPHOCYTES # BLD: 27 % (ref 15–40)
MCH RBC QN AUTO: 28.8 PG (ref 26–34)
MCHC RBC AUTO-ENTMCNC: 34 G/DL (ref 31–37)
MCV RBC AUTO: 84.8 FL (ref 80–100)
MONOCYTES # BLD: 8 % (ref 4–8)
NRBC AUTOMATED: ABNORMAL PER 100 WBC
PDW BLD-RTO: 16.7 % (ref 12.1–15.2)
PLATELET # BLD: 325 K/UL (ref 140–450)
PLATELET ESTIMATE: ABNORMAL
PMV BLD AUTO: ABNORMAL FL (ref 6–12)
POTASSIUM SERPL-SCNC: 4.3 MMOL/L (ref 3.7–5.3)
RBC # BLD: 4.45 M/UL (ref 4–5.2)
RBC # BLD: ABNORMAL 10*6/UL
SEG NEUTROPHILS: 65 % (ref 47–75)
SEGMENTED NEUTROPHILS ABSOLUTE COUNT: 4.3 K/UL (ref 2.5–7)
SODIUM BLD-SCNC: 136 MMOL/L (ref 135–144)
TOTAL PROTEIN: 7.2 G/DL (ref 6.4–8.3)
WBC # BLD: 6.8 K/UL (ref 3.5–11)
WBC # BLD: ABNORMAL 10*3/UL

## 2021-02-15 PROCEDURE — 1111F DSCHRG MED/CURRENT MED MERGE: CPT | Performed by: NURSE PRACTITIONER

## 2021-02-15 PROCEDURE — 36415 COLL VENOUS BLD VENIPUNCTURE: CPT

## 2021-02-15 PROCEDURE — 83605 ASSAY OF LACTIC ACID: CPT

## 2021-02-15 PROCEDURE — 99496 TRANSJ CARE MGMT HIGH F2F 7D: CPT | Performed by: NURSE PRACTITIONER

## 2021-02-15 PROCEDURE — 80053 COMPREHEN METABOLIC PANEL: CPT

## 2021-02-15 PROCEDURE — 85025 COMPLETE CBC W/AUTO DIFF WBC: CPT

## 2021-02-15 RX ORDER — FAMOTIDINE 20 MG/1
20 TABLET, FILM COATED ORAL 2 TIMES DAILY
Qty: 60 TABLET | Refills: 2 | Status: SHIPPED | OUTPATIENT
Start: 2021-02-15 | End: 2021-05-11 | Stop reason: SDUPTHER

## 2021-02-15 NOTE — PROGRESS NOTES
Post-Discharge Transitional Care Management Services or Hospital Follow Up      [address]   YOB: 1960    Date of Office Visit:  2/15/2021  Date of Hospital Admission: 2/5/21  Date of Hospital Discharge: 2/10/21  Risk of hospital readmission (high >=14%.  Medium >=10%) :Readmission Risk Score: 11      Care management risk score Rising risk (score 2-5) and Complex Care (Scores >=6): 4     Non face to face  following discharge, date last encounter closed (first attempt may have been earlier): 2/11/2021  9:19 AM    Call initiated 2 business days of discharge: Yes    Patient Active Problem List   Diagnosis    Fibromyalgia    Follicular adenoma of thyroid gland    S/P laparoscopic cholecystectomy    Right hip pain    Bilateral leg edema    Chronic fatigue    Left thyroid nodule    Chronic back pain greater than 3 months duration    Shortness of breath    Pulmonary hypertension associated with unclear multi-factorial mechanisms ([address]    Morbid obesity due to excess calories ([address]    MAYURI (obstructive sleep apnea)    Essential hypertension    Coxitis    History of total hip replacement    Gastroesophageal reflux disease without esophagitis    Polyarthritis    Mixed incontinence    Constipation    Acute cystitis without hematuria    RLS (restless legs syndrome)    Cellulitis of leg, right    PSVT (paroxysmal supraventricular tachycardia) (HCC)    Cellulitis of right leg    Venous insufficiency of both lower extremities    Encounter for monitoring amiodarone therapy       Allergies   Allergen Reactions    Ampicillin Rash    Sulfa Antibiotics Rash       Medications listed as ordered at the time of discharge from hospital   Marie Santos, [address] Medication Instructions PRADIP:    Printed on:02/15/21 1220   Medication Information                      amiodarone (CORDARONE) 200 MG tablet  Take 1 tablet by mouth 2 times daily             amitriptyline (ELAVIL) 100 MG tablet  Take 1 tablet by mouth nightly             apixaban (ELIQUIS) 5 MG TABS tablet  Take 1 tablet by mouth 2 times daily             celecoxib (CELEBREX) 100 MG capsule  Take 1 capsule by mouth 2 times daily             dilTIAZem (CARDIZEM CD) 120 MG extended release capsule  Take 1 capsule by mouth daily             Elastic Bandages & Supports (MEDICAL COMPRESSION STOCKINGS) MISC  1 each by Does not apply route daily Knee high 20mmHg             famotidine (PEPCID) 20 MG tablet  Take 1 tablet by mouth 2 times daily             furosemide (LASIX) 20 MG tablet  Take 1 tablet by mouth daily             lactobacillus (CULTURELLE) capsule  Take 1 capsule by mouth 2 times daily (with meals)             metoprolol succinate (TOPROL XL) 50 MG extended release tablet  Take 1 tablet by mouth daily             rOPINIRole (REQUIP) 0.5 MG tablet  Take 1 tablet by mouth 3 times daily             spironolactone (ALDACTONE) 25 MG tablet  Take 25 mg by mouth 2 times daily                   Medications marked \"taking\" at this time  Outpatient Medications Marked as Taking for the 2/15/21 encounter (Office Visit) with ASHLEY Graham CNP   Medication Sig Dispense Refill    famotidine (PEPCID) 20 MG tablet Take 1 tablet by mouth 2 times daily 60 tablet 2    amiodarone (CORDARONE) 200 MG tablet Take 1 tablet by mouth 2 times daily 60 tablet 1    lactobacillus (CULTURELLE) capsule Take 1 capsule by mouth 2 times daily (with meals) 60 capsule 0    dilTIAZem (CARDIZEM CD) 120 MG extended release capsule Take 1 capsule by mouth daily 30 capsule 3    spironolactone (ALDACTONE) 25 MG tablet Take 25 mg by mouth 2 times daily      apixaban (ELIQUIS) 5 MG TABS tablet Take 1 tablet by mouth 2 times daily 180 tablet 3    celecoxib (CELEBREX) 100 MG capsule Take 1 capsule by mouth 2 times daily 180 capsule 1    furosemide (LASIX) 20 MG tablet Take 1 tablet by mouth daily 30 tablet 11    rOPINIRole (REQUIP) 0.5 MG tablet Take 1 tablet by mouth 3 times daily 90 tablet 2    amitriptyline (ELAVIL) 100 MG tablet Take 1 tablet by mouth nightly 90 tablet 1    metoprolol succinate (TOPROL XL) 50 MG extended release tablet Take 1 tablet by mouth daily 90 tablet 3    Elastic Bandages & Supports (MEDICAL COMPRESSION STOCKINGS) MISC 1 each by Does not apply route daily Knee high 20mmHg 1 each 0        Medications patient taking as of now reconciled against medications ordered at time of hospital discharge: Yes    Chief Complaint   Patient presents with    Follow-Up from Hospital     Patient here today for hospital follow up. Was admitted on 2/5/2021 and discharged on 2/10/21 from [facility]. Dx with RLE extensive cellulitis and SVT. She said she is feeling better, but just feels weak. History of Present illness - Follow up of Hospital diagnosis(es): The patient is a 57-year-old woman who presented to the emergency room complaining of worsening swelling and redness in the right lower extremity. She was treated with oral antibiotics outpatient twice and did not improve. She reported no recent injury to her leg. She had no associated fevers or chills, no nausea or vomiting. With worsening redness and swelling she presented to the emergency room for evaluation. In the ER her CMP was normal other than a sodium of 134, creatinine of 1.34, and Alk Phos of 111.  CBC showed a WBC of 7.9, Hgb 12.5, Plt  Ct of 302.  D dimer was 1.06.  A venous US was negative for DVT.  COVID 19 was negative.       She was started on IV and Zosyn empirically. Had Ace wrap's applied to her lower extremities to decrease edema. We continued on her previous home medications. She was clinically improving and we were about to discharge her home when she was noted to have episodes of tachycardia with heart rate in 120s and 130s. She had no associated chest pain, shortness of breath or dizziness, however did have palpitations.   After several doses of IV Lopressor and Cardizem her heart rate did not improve and we consulted cardiologist  who was covering for Dr. Wendy Ochoa this week. He advised to start the patient on amiodarone drip. Her heart rate improved with amnio drip and she was transitioned to oral amiodarone per 's recommendation. Her oral Cardizem was decreased from 240 mg to 120 mg/day. We stopped flecainide. We did continue Toprol-XL 50 mg/day. She is on anticoagulation with Eliquis. This morning the patient continues to be stable with heart rate in 60s, stable blood pressure. Swelling and redness in the right lower extremity improved and I feel we can transition her to oral doxycycline for 3 more days. Since she is medically stable, she will be discharged home. She is to continue on amiodarone 200 mg twice daily, Cardizem  mg/day, Toprol-XL 50 mg/day, stop flecainide. She is advised to monitor her heart rate at home and contact physician or come to the emergency room if heart rate below 50 or if she develops dizziness, weakness, syncope, palpitations, chest pain, shortness of breath or any other abnormal symptoms. She is also advised to follow-up with Dr. Wendy Ochoa in about 2 weeks. Today pt states that she feels good. She is still slightly fatigued. Drinking well and trying to stay hydrated. Her right leg is still red, but not painful. Denies fever or chills.       Inpatient course: Discharge summary reviewed- see chart. Interval history/Current status:     A comprehensive review of systems was negative except for what was noted in the HPI. Vitals:    02/15/21 1018   BP: 122/70   Pulse: 68   Temp: 97.9 °F (36.6 °C)   TempSrc: Oral   SpO2: 98%     There is no height or weight on file to calculate BMI.    Wt Readings from Last 3 Encounters:   02/09/21 (!) 362 lb 8 oz (164.4 kg)   02/02/21 (!) 361 lb (163.7 kg)   01/31/21 (!) 361 lb 3.2 oz (163.8 kg)     BP Readings from Last 3 Encounters:   02/15/21 122/70   02/10/21 126/71 02/02/21 120/71        Physical Exam:  General Appearance: alert and oriented to person, place and time, well developed and well- nourished, in no acute distress  Skin: warm and dry, no rash or erythema  Head: normocephalic and atraumatic  Eyes: pupils equal, round, and reactive to light, extraocular eye movements intact, conjunctivae normal  ENT: tympanic membrane, external ear and ear canal normal bilaterally, nose without deformity, nasal mucosa and turbinates normal without polyps  Neck: supple and non-tender without mass, no thyromegaly or thyroid nodules, no cervical lymphadenopathy  Pulmonary/Chest: clear to auscultation bilaterally- no wheezes, rales or rhonchi, normal air movement, no respiratory distress  Cardiovascular: normal rate, regular rhythm, normal S1 and S2, no murmurs, rubs, clicks, or gallops, distal pulses intact, no carotid bruits  Abdomen: soft, non-tender, non-distended, normal bowel sounds, no masses or organomegaly  Extremities: no cyanosis, clubbing or edema  Musculoskeletal: normal range of motion, no joint swelling, deformity or tenderness  --right lower leg radha, blanchable, 1+edema  Neurologic: reflexes normal and symmetric, no cranial nerve deficit, gait, coordination and speech normal    Assessment/Plan:  1. Acute renal failure superimposed on stage 3b chronic kidney disease, unspecified acute renal failure type ([facility]  ---will recheck CMP and check GFR    - PA DISCHARGE MEDS RECONCILED W/ CURRENT OUTPATIENT MED LIST    2. Cellulitis of right leg  --continue antibiotic until gone. Continue compression stockings.    - PA DISCHARGE MEDS RECONCILED W/ CURRENT OUTPATIENT MED LIST    3. Breast cancer screening by mammogram    - AMALIA CARLOS DIGITAL SCREEN BILATERAL;  Future        Medical Decision Making: moderate complexity

## 2021-02-15 NOTE — TELEPHONE ENCOUNTER
----- Message from Halina Hatchet, APRN - CNP sent at 2/15/2021 11:57 AM EST -----  Please let pt know that her kidney function is not any better. I'd like to send her to nephrology. Find out where she'd like to go please. Thank you.

## 2021-02-15 NOTE — PATIENT INSTRUCTIONS
SURVEY:    You may be receiving a survey from [employer] regarding your visit today. Please complete the survey to enable us to provide the highest quality of care to you and your family. If you cannot score us a very good (5 Stars) on any question, please call the office to discuss how we could have made your experience a very good one. Thank you.     Clinical Care Team: ASHLEY William-MARIAN Rodriguez LPN    Clerical Team: Myriam Dill

## 2021-02-24 ENCOUNTER — PATIENT MESSAGE (OUTPATIENT)
Dept: FAMILY MEDICINE CLINIC | Age: 61
End: 2021-02-24

## 2021-02-24 RX ORDER — AMITRIPTYLINE HYDROCHLORIDE 100 MG/1
100 TABLET, FILM COATED ORAL NIGHTLY
Qty: 90 TABLET | Refills: 1 | Status: SHIPPED | OUTPATIENT
Start: 2021-02-24 | End: 2021-08-30 | Stop reason: SDUPTHER

## 2021-02-24 NOTE — TELEPHONE ENCOUNTER
From: Dave Saini  To: ASHLEY Strange - CNP  Sent: 2/24/2021 10:23 AM EST  Subject: Visit Follow-Up Question    I just got a call. Appointment scheduled.  Thanks

## 2021-03-03 ENCOUNTER — HOSPITAL ENCOUNTER (OUTPATIENT)
Dept: ULTRASOUND IMAGING | Age: 61
Discharge: HOME | End: 2021-03-05
Payer: COMMERCIAL

## 2021-03-03 DIAGNOSIS — N18.32 CHRONIC KIDNEY DISEASE (CKD) STAGE G3B/A1, MODERATELY DECREASED GLOMERULAR FILTRATION RATE (GFR) BETWEEN 30-44 ML/MIN/1.73 SQUARE METER AND ALBUMINURIA CREATININE RATIO LESS THAN 30 MG/G (HCC): ICD-10-CM

## 2021-03-03 PROCEDURE — 76770 US EXAM ABDO BACK WALL COMP: CPT

## 2021-03-04 ENCOUNTER — OFFICE VISIT (OUTPATIENT)
Dept: CARDIOLOGY CLINIC | Age: 61
End: 2021-03-04
Payer: COMMERCIAL

## 2021-03-04 VITALS — HEART RATE: 93 BPM | DIASTOLIC BLOOD PRESSURE: 52 MMHG | OXYGEN SATURATION: 96 % | SYSTOLIC BLOOD PRESSURE: 128 MMHG

## 2021-03-04 DIAGNOSIS — I10 ESSENTIAL HYPERTENSION: ICD-10-CM

## 2021-03-04 DIAGNOSIS — E55.9 VITAMIN D DEFICIENCY DISEASE: ICD-10-CM

## 2021-03-04 DIAGNOSIS — I47.1 SVT (SUPRAVENTRICULAR TACHYCARDIA) (HCC): Primary | ICD-10-CM

## 2021-03-04 PROCEDURE — 1111F DSCHRG MED/CURRENT MED MERGE: CPT | Performed by: INTERNAL MEDICINE

## 2021-03-04 PROCEDURE — 99213 OFFICE O/P EST LOW 20 MIN: CPT | Performed by: INTERNAL MEDICINE

## 2021-03-04 RX ORDER — FLECAINIDE ACETATE 50 MG/1
50 TABLET ORAL 2 TIMES DAILY
COMMUNITY
End: 2021-09-21

## 2021-03-04 NOTE — PROGRESS NOTES
Ov Dr. Cal Dunn for 6 month follow up   And hospital follow pu  Was in hospital for cellulitis and UTI   Had few episodes off SVT  Was taken off flecainide   Since out of hospital cellulitis is better  And no more episodes of SVT   Feeling better per pt       STOP AMIODARONE     IN THREE WEEKS, WILL START FLECAINIDE 50 MG ONE TABLET TWICE A DAY     WILL SET UP FOR 30 DAY EVENT RECORDER IN THREE WEEKS     MONITOR BP/HR AT [address] [facility]

## 2021-03-04 NOTE — PATIENT INSTRUCTIONS
STOP AMIODARONE     IN THREE WEEKS, WILL START FLECAINIDE 50 MG ONE TABLET TWICE A DAY     WILL SET UP FOR 30 DAY EVENT RECORDER IN THREE WEEKS     MONITOR BP/HR AT HOME     FOLLOW UP IN THREE MONTHS

## 2021-03-04 NOTE — LETTER
Paddy Rojas M.D. [address], [place] [address]  [phone]        2021        [facility]    RE:   Chuck Callejas  :  1960    Dear Minoo Whiteside:    CHIEF COMPLAINT:  1. SVT. 2.  Hospitalization for cellulitis on , complicated by episodes of tachycardia with heart rates in the 120s to 130s, with her flecainide being stopped and her being started on amiodarone. HISTORY OF PRESENT ILLNESS:  I had the pleasure of seeing Kirk Pete in the office on 2021. She is a very pleasant 70-year-old female who looks younger than her stated age. She has a history of hypertension, severe fibromyalgia, and arthritis. She also has a history of SVT. On 2019, she had palpitations, felt short of breath, went to the emergency room, and had a CT scan that showed a pulmonary embolism in the distal main pulmonary artery. Echocardiogram showed EF of 60% to 65% with dilated right atrium and right ventricle. She also had SVT at 170 beats per minute. She was discharged on 2019. She was placed on Eliquis 5 mg b.i.d. and Cardizem. She went back to the emergency room on 2019 and had an EKG that showed SVT at 151 beats per minute. She was converted to sinus rhythm with adenosine and placed on metoprolol. She did have marked daytime somnolence and we recommended a sleep study, which she did not do. She had had another episode of SVT on 2020, and I saw her at that time and added flecainide 50 mg b.i.d. She was having some edema and we increased her Aldactone to 25 mg b.i.d. We placed her on Lasix 20 mg daily and stopped her Aldactone. She had been doing well. However, she developed cellulitis of her right leg on 2021. She was treated with antibiotics.   However, during the hospitalization, she had episodes of heart rates in the 120 to 130 range.  EKG showed possible SVT. I do not have the rhythm strips, but I did review some EKGs that looked like that it might be a sinus tachycardia. Dr. Rajendra Youngblood was consulted by video and he recommended stopping flecainide and starting amiodarone 200 mg b.i.d., which was done. She made a good recovery and I am seeing her today in followup. She denies any chest pain or chest discomfort. She has had no further episodes of SVT. It seems like most of her episodes of SVT have been with extenuating circumstances such as infections. She denies any chest pain or chest discomfort. She denies any PND, orthopnea or pedal edema. She does have a history of renal insufficiency and did see Dr. Paula Farooq on 02/26/2021. Her creatinine is in the 1.5 range. She had a sleep study on 10/14/2020. I am sure it is in the system somewhere, but I only had 20 minutes to search for the report and ran out of time and could not finish the search; therefore, I am not sure what it showed. .. She denies any chest pain or chest discomfort. She tries to be as active as possible. She has had no further episodes of her cellulitis. She does have some edema, although it is relatively well controlled. She has never had a myocardial infarction and never had a cardiac catheterization. CARDIAC RISK FACTORS:  Hypertension:  Positive. Other Family Members:  Negative. Peripheral Vascular Disease:  Negative. Smoking:  Negative. Diabetes:  Negative. Hyperlipidemia:  Mildly positive. MEDICATIONS AT THIS TIME:  She is on Elavil 100 mg nightly, Eliquis 5 mg b.i.d., Celebrex 100 mg b.i.d., Cardizem  mg daily, Pepcid 20 mg b.i.d., amiodarone 200 mg b.i.d., Lasix 20 mg daily, Toprol-XL 50 mg daily, Requip 0.5 mg t.i.d., Aldactone 25 mg b.i.d. PAST MEDICAL AND SURGICAL HISTORY:  1. She had a left thyroid nodule on 07/25/2013. 2.  Hypertension. 3.  Fibromyalgia. 4.  DVT of the left leg in 2012. 5.  Depression.   6.  Left PULSES:  Bilateral symmetrical radial, brachial and carotid pulses. No carotid bruits. Good femoral and pedal pulses. NEUROLOGIC EXAM:  Within normal limits. PSYCHIATRIC EXAM:  Within normal limits. LABORATORY DATA:  From 02/15/2021, sodium 136, potassium 4.3, BUN 24, creatinine 1.58, GFR was 33. Calcium 10.2. Her ALT was 36, AST was 21. White count 6.8, hemoglobin 12.8 with a platelet count 695,406. EKG showed sinus rhythm with poor R-wave progression, which was unchanged. Echocardiogram on 09/08/2020, with normal LV function, EF of 60%, with enlarged right-sided chambers consistent with pulmonary hypertension secondary to sleep apnea. IMPRESSION:  1. History of SVT, which had been controlled with flecainide. 2.  Hospitalization for cellulitis on 02/05/2021, with episodes of tachycardia at 120 to 130 beats per minute. 3.  Change in medication from flecainide to amiodarone 200 mg b.i.d., which she is on at this time. 4.  Normal LV function with an EF of 60%. 5.  Status post sleep study although I cannot find the results. 6.  Hypertension. 7.  PE on 11/29/2019, unprovoked, with her on Eliquis 5 mg b.i.d. indefinitely. 8.  Chronic renal insufficiency, with her being followed by Dr. Keisha Young, which has been stable with a creatinine of 1.5 to 1.7 range. PLAN:  1. We will stop amiodarone. 2.  We will restart flecainide 50 mg b.i.d.  3.  She will wear a 30-day event recorder to monitor her rhythm at home. 4.  She will take her blood pressure and heart rate at home and write down the results. 5.  We will follow up in 3 months. DISCUSSION:  [address] had episodes of tachycardia during her hospitalization for cellulitis on 02/05/2021. I did see an EKG with a heart rate of 120 that appeared to be a sinus tachycardia. She is 61years old and I hesitate to see her on long-term amiodarone at this time. She had been doing well on flecainide 50 mg b.i.d.     Therefore, I have stopped amiodarone

## 2021-03-08 NOTE — PROGRESS NOTES
Cinthya Sargent M.D. [address] [place] [address], [place] [address]  [phone]        2021        Janneth Dowling, [facility], [place] [address]    RE:   Ayaka Becerra  :  1960    Dear Kyree:    CHIEF COMPLAINT:  1. SVT. 2.  Hospitalization for cellulitis on , complicated by episodes of tachycardia with heart rates in the 120s to 130s, with her flecainide being stopped and her being started on amiodarone. HISTORY OF PRESENT ILLNESS:  I had the pleasure of seeing [address] in the office on 2021. She is a very pleasant 78-year-old female who looks younger than her stated age. She has a history of hypertension, severe fibromyalgia, and arthritis. She also has a history of SVT. On 2019, she had palpitations, felt short of breath, went to the emergency room, and had a CT scan that showed a pulmonary embolism in the distal main pulmonary artery. Echocardiogram showed EF of 60% to 65% with dilated right atrium and right ventricle. She also had SVT at 170 beats per minute. She was discharged on 2019. She was placed on Eliquis 5 mg b.i.d. and Cardizem. She went back to the emergency room on 2019 and had an EKG that showed SVT at 151 beats per minute. She was converted to sinus rhythm with adenosine and placed on metoprolol. She did have marked daytime somnolence and we recommended a sleep study, which she did not do. She had had another episode of SVT on 2020, and I saw her at that time and added flecainide 50 mg b.i.d. She was having some edema and we increased her Aldactone to 25 mg b.i.d. We placed her on Lasix 20 mg daily and stopped her Aldactone. She had been doing well. However, she developed cellulitis of her right leg on 2021. She was treated with antibiotics.   However, during the hospitalization, she had episodes of heart rates in the 120 to 130 range.  EKG showed possible SVT. I do not have the rhythm strips, but I did review some EKGs that looked like that it might be a sinus tachycardia. Dr. Kathrine Arora was consulted by video and he recommended stopping flecainide and starting amiodarone 200 mg b.i.d., which was done. She made a good recovery and I am seeing her today in followup. She denies any chest pain or chest discomfort. She has had no further episodes of SVT. It seems like most of her episodes of SVT have been with extenuating circumstances such as infections. She denies any chest pain or chest discomfort. She denies any PND, orthopnea or pedal edema. She does have a history of renal insufficiency and did see Dr. Janice Beckford on 02/26/2021. Her creatinine is in the 1.5 range. She had a sleep study on 10/14/2020. I am sure it is in the system somewhere, but I only had 20 minutes to search for the report and ran out of time and could not finish the search; therefore, I am not sure what it showed. .. She denies any chest pain or chest discomfort. She tries to be as active as possible. She has had no further episodes of her cellulitis. She does have some edema, although it is relatively well controlled. She has never had a myocardial infarction and never had a cardiac catheterization. CARDIAC RISK FACTORS:  Hypertension:  Positive. Other Family Members:  Negative. Peripheral Vascular Disease:  Negative. Smoking:  Negative. Diabetes:  Negative. Hyperlipidemia:  Mildly positive. MEDICATIONS AT THIS TIME:  She is on Elavil 100 mg nightly, Eliquis 5 mg b.i.d., Celebrex 100 mg b.i.d., Cardizem  mg daily, Pepcid 20 mg b.i.d., amiodarone 200 mg b.i.d., Lasix 20 mg daily, Toprol-XL 50 mg daily, Requip 0.5 mg t.i.d., Aldactone 25 mg b.i.d. PAST MEDICAL AND SURGICAL HISTORY:  1. She had a left thyroid nodule on 07/25/2013. 2.  Hypertension. 3.  Fibromyalgia. 4.  DVT of the left leg in 2012. 5.  Depression.   6.  Left refill. PULSES:  Bilateral symmetrical radial, brachial and carotid pulses. No carotid bruits. Good femoral and pedal pulses. NEUROLOGIC EXAM:  Within normal limits. PSYCHIATRIC EXAM:  Within normal limits. LABORATORY DATA:  From 02/15/2021, sodium 136, potassium 4.3, BUN 24, creatinine 1.58, GFR was 33. Calcium 10.2. Her ALT was 36, AST was 21. White count 6.8, hemoglobin 12.8 with a platelet count 693,931. EKG showed sinus rhythm with poor R-wave progression, which was unchanged. Echocardiogram on 09/08/2020, with normal LV function, EF of 60%, with enlarged right-sided chambers consistent with pulmonary hypertension secondary to sleep apnea. IMPRESSION:  1. History of SVT, which had been controlled with flecainide. 2.  Hospitalization for cellulitis on 02/05/2021, with episodes of tachycardia at 120 to 130 beats per minute. 3.  Change in medication from flecainide to amiodarone 200 mg b.i.d., which she is on at this time. 4.  Normal LV function with an EF of 60%. 5.  Status post sleep study although I cannot find the results. 6.  Hypertension. 7.  PE on 11/29/2019, unprovoked, with her on Eliquis 5 mg b.i.d. indefinitely. 8.  Chronic renal insufficiency, with her being followed by Dr. Miguel Angel Sauer, which has been stable with a creatinine of 1.5 to 1.7 range. PLAN:  1. We will stop amiodarone. 2.  We will restart flecainide 50 mg b.i.d.  3.  She will wear a 30-day event recorder to monitor her rhythm at home. 4.  She will take her blood pressure and heart rate at home and write down the results. 5.  We will follow up in 3 months. DISCUSSION:  Raimundo Scherer had episodes of tachycardia during her hospitalization for cellulitis on 02/05/2021. I did see an EKG with a heart rate of 120 that appeared to be a sinus tachycardia. She is 61years old and I hesitate to see her on long-term amiodarone at this time. She had been doing well on flecainide 50 mg b.i.d.     Therefore, I have stopped

## 2021-03-25 ENCOUNTER — HOSPITAL ENCOUNTER (OUTPATIENT)
Dept: NON INVASIVE DIAGNOSTICS | Age: 61
Discharge: HOME | End: 2021-03-25
Payer: COMMERCIAL

## 2021-03-25 DIAGNOSIS — I47.1 SVT (SUPRAVENTRICULAR TACHYCARDIA) (HCC): ICD-10-CM

## 2021-03-25 PROCEDURE — 93270 REMOTE 30 DAY ECG REV/REPORT: CPT

## 2021-04-09 ENCOUNTER — PATIENT MESSAGE (OUTPATIENT)
Dept: FAMILY MEDICINE CLINIC | Age: 61
End: 2021-04-09

## 2021-04-09 DIAGNOSIS — M79.7 FIBROMYALGIA: ICD-10-CM

## 2021-04-09 DIAGNOSIS — G89.29 CHRONIC BACK PAIN GREATER THAN 3 MONTHS DURATION: Primary | ICD-10-CM

## 2021-04-09 DIAGNOSIS — M13.0 POLYARTHRITIS: ICD-10-CM

## 2021-04-09 DIAGNOSIS — M54.9 CHRONIC BACK PAIN GREATER THAN 3 MONTHS DURATION: Primary | ICD-10-CM

## 2021-04-09 NOTE — TELEPHONE ENCOUNTER
From: Arthur Donaldson  To:  ASHLEY Andrade - CNP  Sent: 4/9/2021 10:40 AM EDT  Subject: Non-Urgent Medical Question    I would like to go to a pain management doctor and would like to know who you would recommend

## 2021-04-16 ENCOUNTER — HOSPITAL ENCOUNTER (OUTPATIENT)
Age: 61
Discharge: HOME | End: 2021-04-16
Payer: COMMERCIAL

## 2021-04-16 LAB
-: ABNORMAL
ALBUMIN SERPL-MCNC: 4.1 G/DL (ref 3.5–5.2)
AMORPHOUS: ABNORMAL
ANION GAP SERPL CALCULATED.3IONS-SCNC: 10 MMOL/L (ref 9–17)
BACTERIA: ABNORMAL
BILIRUBIN URINE: NEGATIVE
BUN BLDV-MCNC: 15 MG/DL (ref 8–23)
BUN/CREAT BLD: 12 (ref 9–20)
CALCIUM SERPL-MCNC: 9.7 MG/DL (ref 8.6–10.4)
CASTS UA: ABNORMAL /LPF
CHLORIDE BLD-SCNC: 103 MMOL/L (ref 98–107)
CO2: 26 MMOL/L (ref 20–31)
COLOR: YELLOW
COMMENT UA: ABNORMAL
COMPLEMENT C3: 163 MG/DL (ref 90–180)
COMPLEMENT C4: 22 MG/DL (ref 10–40)
CREAT SERPL-MCNC: 1.25 MG/DL (ref 0.5–0.9)
CREATININE URINE: 273.1 MG/DL (ref 28–217)
CRYSTALS, UA: ABNORMAL /HPF
EPITHELIAL CELLS UA: ABNORMAL /HPF
GFR AFRICAN AMERICAN: 53 ML/MIN
GFR NON-AFRICAN AMERICAN: 44 ML/MIN
GFR SERPL CREATININE-BSD FRML MDRD: ABNORMAL ML/MIN/{1.73_M2}
GFR SERPL CREATININE-BSD FRML MDRD: ABNORMAL ML/MIN/{1.73_M2}
GLUCOSE BLD-MCNC: 117 MG/DL (ref 70–99)
GLUCOSE URINE: NEGATIVE
HCT VFR BLD CALC: 39.2 % (ref 36–46)
HEMOGLOBIN: 13.3 G/DL (ref 12–16)
KETONES, URINE: NEGATIVE
LEUKOCYTE ESTERASE, URINE: ABNORMAL
MAGNESIUM: 2 MG/DL (ref 1.6–2.6)
MCH RBC QN AUTO: 30.1 PG (ref 26–34)
MCHC RBC AUTO-ENTMCNC: 33.9 G/DL (ref 31–37)
MCV RBC AUTO: 88.8 FL (ref 80–100)
MUCUS: ABNORMAL
NITRITE, URINE: NEGATIVE
NRBC AUTOMATED: ABNORMAL PER 100 WBC
OTHER OBSERVATIONS UA: ABNORMAL
PDW BLD-RTO: 16.8 % (ref 12.1–15.2)
PH UA: 5 (ref 5–8)
PHOSPHORUS: 3.6 MG/DL (ref 2.6–4.5)
PLATELET # BLD: 294 K/UL (ref 140–450)
PMV BLD AUTO: ABNORMAL FL (ref 6–12)
POTASSIUM SERPL-SCNC: 4.7 MMOL/L (ref 3.7–5.3)
PROTEIN UA: ABNORMAL
PTH INTACT: 57.56 PG/ML (ref 15–65)
RBC # BLD: 4.42 M/UL (ref 4–5.2)
RBC UA: ABNORMAL /HPF (ref 0–2)
RENAL EPITHELIAL, UA: ABNORMAL /HPF
SODIUM BLD-SCNC: 139 MMOL/L (ref 135–144)
SPECIFIC GRAVITY UA: 1.02 (ref 1–1.03)
TOTAL PROTEIN, URINE: 28 MG/DL
TRICHOMONAS: ABNORMAL
TURBIDITY: ABNORMAL
URINE HGB: ABNORMAL
URINE TOTAL PROTEIN CREATININE RATIO: 0.1 (ref 0–0.2)
UROBILINOGEN, URINE: NORMAL
WBC # BLD: 6.2 K/UL (ref 3.5–11)
WBC UA: ABNORMAL /HPF
YEAST: ABNORMAL

## 2021-04-16 PROCEDURE — 86334 IMMUNOFIX E-PHORESIS SERUM: CPT

## 2021-04-16 PROCEDURE — 83970 ASSAY OF PARATHORMONE: CPT

## 2021-04-16 PROCEDURE — 83516 IMMUNOASSAY NONANTIBODY: CPT

## 2021-04-16 PROCEDURE — 80069 RENAL FUNCTION PANEL: CPT

## 2021-04-16 PROCEDURE — 81001 URINALYSIS AUTO W/SCOPE: CPT

## 2021-04-16 PROCEDURE — 84165 PROTEIN E-PHORESIS SERUM: CPT

## 2021-04-16 PROCEDURE — 83735 ASSAY OF MAGNESIUM: CPT

## 2021-04-16 PROCEDURE — 86038 ANTINUCLEAR ANTIBODIES: CPT

## 2021-04-16 PROCEDURE — 86160 COMPLEMENT ANTIGEN: CPT

## 2021-04-16 PROCEDURE — 84156 ASSAY OF PROTEIN URINE: CPT

## 2021-04-16 PROCEDURE — 36415 COLL VENOUS BLD VENIPUNCTURE: CPT

## 2021-04-16 PROCEDURE — 85027 COMPLETE CBC AUTOMATED: CPT

## 2021-04-16 PROCEDURE — 84155 ASSAY OF PROTEIN SERUM: CPT

## 2021-04-16 PROCEDURE — 86335 IMMUNFIX E-PHORSIS/URINE/CSF: CPT

## 2021-04-16 PROCEDURE — 82570 ASSAY OF URINE CREATININE: CPT

## 2021-04-19 LAB
ALBUMIN (CALCULATED): 4.2 G/DL (ref 3.2–5.2)
ALBUMIN PERCENT: 62 % (ref 45–65)
ALPHA 1 PERCENT: 3 % (ref 3–6)
ALPHA 2 PERCENT: 11 % (ref 6–13)
ALPHA-1-GLOBULIN: 0.2 G/DL (ref 0.1–0.4)
ALPHA-2-GLOBULIN: 0.8 G/DL (ref 0.5–0.9)
ANTI-NUCLEAR ANTIBODY (ANA): NEGATIVE
BETA GLOBULIN: 0.8 G/DL (ref 0.5–1.1)
BETA PERCENT: 11 % (ref 11–19)
GAMMA GLOBULIN %: 13 % (ref 9–20)
GAMMA GLOBULIN: 0.9 G/DL (ref 0.5–1.5)
PATHOLOGIST: NORMAL
PATHOLOGIST: NORMAL
PROTEIN ELECTROPHORESIS, SERUM: NORMAL
SERUM IFX INTERP: NORMAL
TOTAL PROT. SUM,%: 100 % (ref 98–102)
TOTAL PROT. SUM: 6.9 G/DL (ref 6.3–8.2)
TOTAL PROTEIN: 6.8 G/DL (ref 6.4–8.3)
URINE IFX INTERP: NORMAL
URINE IFX SPECIMEN: NORMAL
URINE TOTAL PROTEIN: 21 MG/DL
VOLUME: NORMAL ML

## 2021-04-20 LAB
ANCA MYELOPEROXIDASE: <0.3 AU/ML (ref 0–3.5)
ANCA PROTEINASE 3: <0.7 AU/ML (ref 0–2)

## 2021-04-27 NOTE — PROCEDURES
[address]                                 EVENT MONITOR    PATIENT NAME: Delta Villar                       :        1960  MED REC NO:   [id number]                              ROOM:  ACCOUNT NO:   [de-identified]                           ADMIT DATE: 2021  PROVIDER:     Tejal Claros      NAME OF THE TEST:  Event recorder. INDICATION FOR STUDY: SVT    The patient wore an event recorder from 2021 to 2021. We  received a total of 4 rhythm strips. One was symptomatic, three were  asymptomatic. Each strip showed normal sinus rhythm. There was one  PVC. The highest rate was 120, lowest rate was 61, average heart rate  was 65. She had occasional PACs and occasional PVCs. On the  symptomatic strip, there was no arrhythmia detected and only one PVC  during the symptomatic event. There was no tachycardia, no bradycardia,  so overall an unremarkable event recorder with no arrhythmias detected. Corrie Napoles    D: 2021 3:31:58       T: 2021 10:57:12     [email or web address]  Job#: [id number]     Doc#: [id number]    CC:  Maria Isabel Dowling

## 2021-04-30 ENCOUNTER — TELEMEDICINE (OUTPATIENT)
Dept: PAIN MANAGEMENT | Age: 61
End: 2021-04-30
Payer: COMMERCIAL

## 2021-04-30 DIAGNOSIS — S33.6XXD SACROILIAC (LIGAMENT) SPRAIN, SUBSEQUENT ENCOUNTER: ICD-10-CM

## 2021-04-30 DIAGNOSIS — M54.12 CERVICAL RADICULOPATHY: Primary | ICD-10-CM

## 2021-04-30 PROCEDURE — 99204 OFFICE O/P NEW MOD 45 MIN: CPT | Performed by: PHYSICAL MEDICINE & REHABILITATION

## 2021-04-30 NOTE — PROGRESS NOTES
Pain Management Consultation    [id number] [place] [address] [address] [address]  Dept: [phone]    No referring provider defined for this encounter. Mando Art is a 64 y.o. female, who came today due to  back pain    Cause of the symptom(s): myofascial pain and degenerative (arthritis)    Reports chronic back, shoulder pain and fibromyalgia. Today, her pain is mostly shoulder pain. But she comes today for the sacroiliac joint pain.      Quality of Symptoms: aching and throbbing    Aggravating factors: activity     Relieving factors: rest    Treatment done: physical therapy, injection, anti-inflammatory medication and muscle relaxant    Current pain level: 5 on the scale of 0-10 ( 10 being worst)       Allergies   Allergen Reactions    Ampicillin Rash    Sulfa Antibiotics Rash       Social History     Socioeconomic History    Marital status:      Spouse name: Not on file    Number of children: Not on file    Years of education: Not on file    Highest education level: Not on file   Occupational History    Not on file   Social Needs    Financial resource strain: Not hard at all   [email or web address]     Worry: Never true     Inability: Never true   [employer] needs     Medical: No     Non-medical: No   Tobacco Use    Smoking status: Never Smoker    Smokeless tobacco: Never Used   Substance and Sexual Activity    Alcohol use: No     Alcohol/week: 0.0 standard drinks     Comment: once in 10 years may have a glass wine    Drug use: No    Sexual activity: Yes     Partners: Male   Lifestyle    Physical activity     Days per week: Not on file     Minutes per session: Not on file    Stress: Not on file   Relationships    Social connections     Talks on phone: Not on file     Gets together: Not on file     Attends Hindu service: Not on file     Active member of club or organization: Not on file     Attends a billable service. Verbal consent to proceed has been obtained within the past 12 months. The visit was conducted pursuant to the emergency declaration under the [address] [place] [address] and the [employer] Act. Patient identification was verified, and a caregiver was present when appropriate. The patient was located in a state where the provider was credentialed to provide care. Total time spent for this encounter: Not billed by time    --Janene Rivera MD on 4/30/2021 at 1:07 PM    An electronic signature was used to authenticate this note.

## 2021-05-03 RX ORDER — ROPINIROLE 0.5 MG/1
0.5 TABLET, FILM COATED ORAL 3 TIMES DAILY
Qty: 90 TABLET | Refills: 2 | Status: SHIPPED | OUTPATIENT
Start: 2021-05-03 | End: 2021-07-13

## 2021-05-03 NOTE — TELEPHONE ENCOUNTER
Last OV: 2/15/2021 Renal Failure , cellulitis   Last RX:   Next scheduled apt: Visit date not found      Sure scripts request  RX pending

## 2021-05-12 RX ORDER — FAMOTIDINE 20 MG/1
20 TABLET, FILM COATED ORAL 2 TIMES DAILY
Qty: 60 TABLET | Refills: 2 | Status: SHIPPED | OUTPATIENT
Start: 2021-05-12 | End: 2021-06-07 | Stop reason: SDUPTHER

## 2021-05-19 ENCOUNTER — OFFICE VISIT (OUTPATIENT)
Dept: FAMILY MEDICINE CLINIC | Age: 61
End: 2021-05-19
Payer: COMMERCIAL

## 2021-05-19 VITALS
OXYGEN SATURATION: 98 % | HEART RATE: 68 BPM | TEMPERATURE: 98 F | DIASTOLIC BLOOD PRESSURE: 60 MMHG | SYSTOLIC BLOOD PRESSURE: 112 MMHG

## 2021-05-19 DIAGNOSIS — L03.115 CELLULITIS OF RIGHT LOWER EXTREMITY: Primary | ICD-10-CM

## 2021-05-19 PROCEDURE — 99213 OFFICE O/P EST LOW 20 MIN: CPT | Performed by: NURSE PRACTITIONER

## 2021-05-19 RX ORDER — DOXYCYCLINE HYCLATE 100 MG
100 TABLET ORAL 2 TIMES DAILY
Qty: 14 TABLET | Refills: 0 | Status: SHIPPED | OUTPATIENT
Start: 2021-05-19 | End: 2021-05-26

## 2021-05-19 ASSESSMENT — ENCOUNTER SYMPTOMS
COUGH: 0
DIARRHEA: 0
VOMITING: 0
NAUSEA: 0
SHORTNESS OF BREATH: 0

## 2021-05-19 ASSESSMENT — SOCIAL DETERMINANTS OF HEALTH (SDOH): HOW HARD IS IT FOR YOU TO PAY FOR THE VERY BASICS LIKE FOOD, HOUSING, MEDICAL CARE, AND HEATING?: NOT HARD AT ALL

## 2021-05-19 NOTE — PATIENT INSTRUCTIONS
SURVEY:    You may be receiving a survey from [employer] regarding your visit today. Please complete the survey to enable us to provide the highest quality of care to you and your family. If you cannot score us a very good (5 Stars) on any question, please call the office to discuss how we could have made your experience a very good one. Thank you.     Clinical Care Team: EDWIN Rios LPN    Clerical Team: Myriam Leroy

## 2021-05-19 NOTE — PROGRESS NOTES
HPI Notes    Name: Cyndy Desai  : 1960         Chief Complaint:     Chief Complaint   Patient presents with    Cellulitis     Patient here today with right lower leg redness and swelling. Started 5 days ago. History of Present Illness:        HPI  Pt is a 65 yo female who presents for right lower leg redness, right heel redness, and edema to right lower leg. Symptoms started 5 days ago. Pt had some \"left over\" antibiotics that she does not know the name of and took those. Pt states she has a large crack in the skin of the right heel that is painful. Denies any drainage, denies fevers. Past Medical History:     Past Medical History:   Diagnosis Date    Acid reflux     Bleeding tendency (HCC)     Blood type O+     Bronchitis     Chronic back pain     Depression     Diverticulitis     DVT (deep venous thrombosis) ([facility] [address]     Left leg    Fibromyalgia     Hypertension     Internal hemorrhoid     Left thyroid nodule 2013    Nausea & vomiting     Pulmonary embolism (HCC)       Reviewed all health maintenance requirements and ordered appropriate tests  Health Maintenance Due   Topic Date Due    COVID-19 Vaccine (1) Never done    DTaP/Tdap/Td vaccine (1 - Tdap) Never done    Shingles Vaccine (1 of 2) Never done    Cervical cancer screen  09/15/2017    Breast cancer screen  05/10/2018       Past Surgical History:     Past Surgical History:   Procedure Laterality Date    CHOLECYSTECTOMY  2013    laparoscopic    COLONOSCOPY      / Dr. Davian Parsons Left         Medications:       Prior to Admission medications    Medication Sig Start Date End Date Taking?  Authorizing Provider   doxycycline hyclate (VIBRA-TABS) 100 MG tablet Take 1 tablet by mouth 2 times daily for 7 days 21 Yes ASHLEY Reeder CNP   famotidine (PEPCID) 20 MG tablet Take 1 tablet by mouth 2 times daily 21  Yes Miriam Burger Cardiovascular: Negative for chest pain and palpitations. Gastrointestinal: Negative for diarrhea, nausea and vomiting. Skin: Positive for wound (right heel). Neurological: Negative for dizziness, seizures and headaches. Physical Exam:     Vitals:  /60   Pulse 68   Temp 98 °F (36.7 °C) (Oral)   SpO2 98%       Physical Exam  Vitals and nursing note reviewed. Constitutional:       Appearance: Normal appearance. She is well-developed. Cardiovascular:      Rate and Rhythm: Normal rate and regular rhythm. Heart sounds: Normal heart sounds, S1 normal and S2 normal.   Pulmonary:      Effort: Pulmonary effort is normal. No respiratory distress. Breath sounds: Normal breath sounds. Abdominal:      General: Bowel sounds are normal.      Palpations: Abdomen is soft. Tenderness: There is no abdominal tenderness. Musculoskeletal:      Right lower leg: Swelling present. Legs:       Comments: Bruno discoloration right anterior lower leg. Right heel with large crack in plantar surface. Surrounding erythema. No drainage. Skin:     General: Skin is warm and dry. Neurological:      Mental Status: She is alert and oriented to person, place, and time.                Data:     Lab Results   Component Value Date     04/16/2021    K 4.7 04/16/2021     04/16/2021    CO2 26 04/16/2021    BUN 15 04/16/2021    CREATININE 1.25 04/16/2021    GLUCOSE 117 04/16/2021    PROT 6.8 04/16/2021    LABALBU 4.1 04/16/2021    BILITOT 0.39 02/15/2021    ALKPHOS 115 02/15/2021    AST 21 02/15/2021    ALT 36 02/15/2021     Lab Results   Component Value Date    WBC 6.2 04/16/2021    RBC 4.42 04/16/2021    RBC 4.17 05/15/2012    HGB 13.3 04/16/2021    HCT 39.2 04/16/2021    MCV 88.8 04/16/2021    MCH 30.1 04/16/2021    MCHC 33.9 04/16/2021    RDW 16.8 04/16/2021     04/16/2021     05/15/2012    MPV NOT REPORTED 04/16/2021     Lab Results   Component Value Date    TSH 3.48 02/09/2021     Lab Results   Component Value Date    CHOL 176 08/28/2020    CHOL 207 05/23/2019    HDL 46 08/28/2020    LABA1C 6.1 10/27/2020          Assessment & Plan        Diagnosis Orders   1. Cellulitis of right lower extremity       Given hx of cellulitis with subsequent acute renal failure, will start on doxycycline. Pt educated about medication. Pt advised to wear shoes when outside. Moisturize feet and lower legs. Patient verbalizes understanding and agreement with plan. All questions answered. If symptoms do not resolve or worsen, return to office. Completed Refills   Requested Prescriptions     Signed Prescriptions Disp Refills    doxycycline hyclate (VIBRA-TABS) 100 MG tablet 14 tablet 0     Sig: Take 1 tablet by mouth 2 times daily for 7 days     No follow-ups on file. Orders Placed This Encounter   Medications    doxycycline hyclate (VIBRA-TABS) 100 MG tablet     Sig: Take 1 tablet by mouth 2 times daily for 7 days     Dispense:  14 tablet     Refill:  0     No orders of the defined types were placed in this encounter. Patient Instructions   SURVEY:    You may be receiving a survey from [employer] regarding your visit today. Please complete the survey to enable us to provide the highest quality of care to you and your family. If you cannot score us a very good (5 Stars) on any question, please call the office to discuss how we could have made your experience a very good one. Thank you.     Clinical Care Team: EDWIN Velazquez LPN    Clerical Team: Kirk Hitchcock        Electronically signed by ASHLEY Velazquez CNP on 5/19/2021 at 11:51 AM           Completed Refills      Requested Prescriptions     Signed Prescriptions Disp Refills    doxycycline hyclate (VIBRA-TABS) 100 MG tablet 14 tablet 0     Sig: Take 1 tablet by mouth 2 times daily for 7 days         Marifer received counseling on the following healthy behaviors: nutrition, exercise and medication adherence  Reviewed prior labs and health maintenance. Continue current medications, diet and exercise. Discussed use, benefit, and side effects of prescribed medications. Barriers to medication compliance addressed. Patient given educational materials - see patient instructions. All patient questions answered. Patient voiced understanding.

## 2021-05-20 ENCOUNTER — TELEPHONE (OUTPATIENT)
Dept: CARDIOLOGY CLINIC | Age: 61
End: 2021-05-20

## 2021-05-20 NOTE — LETTER
[facility] CARDIOLOGY SPECIALIST  [facility] [address]  [place] [address]  Dept: [phone]  Dept Fax: [phone]          5/20/21      To Whom it May Concern: In my opinion, from a cardiology standpoint, Madalyn Douglass is cleared for surgery. She may hold her Eliquis 3 days prior to procedure. If you have any questions, please feel free to call me at [phone].     Sincerely,        Lucia Carrillo M.D.

## 2021-05-27 ENCOUNTER — HOSPITAL ENCOUNTER (OUTPATIENT)
Age: 61
Discharge: HOME | End: 2021-05-27
Payer: COMMERCIAL

## 2021-05-27 DIAGNOSIS — I10 ESSENTIAL HYPERTENSION: ICD-10-CM

## 2021-05-27 DIAGNOSIS — R06.02 SHORTNESS OF BREATH: ICD-10-CM

## 2021-05-27 DIAGNOSIS — I47.1 SVT (SUPRAVENTRICULAR TACHYCARDIA) (HCC): ICD-10-CM

## 2021-05-27 DIAGNOSIS — E55.9 VITAMIN D DEFICIENCY DISEASE: ICD-10-CM

## 2021-05-27 LAB
ABSOLUTE EOS #: 0 K/UL (ref 0–0.4)
ABSOLUTE IMMATURE GRANULOCYTE: NORMAL K/UL (ref 0–0.3)
ABSOLUTE LYMPH #: 1.4 K/UL (ref 1–4.8)
ABSOLUTE MONO #: 0.4 K/UL (ref 0–1)
ALBUMIN SERPL-MCNC: 3.6 G/DL (ref 3.5–5.2)
ALBUMIN/GLOBULIN RATIO: ABNORMAL (ref 1–2.5)
ALP BLD-CCNC: 126 U/L (ref 35–104)
ALT SERPL-CCNC: 17 U/L (ref 5–33)
ANION GAP SERPL CALCULATED.3IONS-SCNC: 9 MMOL/L (ref 9–17)
AST SERPL-CCNC: 16 U/L
BASOPHILS # BLD: 0 % (ref 0–2)
BASOPHILS ABSOLUTE: 0 K/UL (ref 0–0.2)
BILIRUB SERPL-MCNC: 0.43 MG/DL (ref 0.3–1.2)
BUN BLDV-MCNC: 25 MG/DL (ref 8–23)
BUN/CREAT BLD: 19 (ref 9–20)
CALCIUM SERPL-MCNC: 9.2 MG/DL (ref 8.6–10.4)
CHLORIDE BLD-SCNC: 102 MMOL/L (ref 98–107)
CHOLESTEROL/HDL RATIO: 4
CHOLESTEROL: 172 MG/DL
CO2: 26 MMOL/L (ref 20–31)
CREAT SERPL-MCNC: 1.32 MG/DL (ref 0.5–0.9)
DIFFERENTIAL TYPE: YES
EOSINOPHILS RELATIVE PERCENT: 0 % (ref 0–5)
GFR AFRICAN AMERICAN: 50 ML/MIN
GFR NON-AFRICAN AMERICAN: 41 ML/MIN
GFR SERPL CREATININE-BSD FRML MDRD: ABNORMAL ML/MIN/{1.73_M2}
GFR SERPL CREATININE-BSD FRML MDRD: ABNORMAL ML/MIN/{1.73_M2}
GLUCOSE BLD-MCNC: 119 MG/DL (ref 70–99)
HCT VFR BLD CALC: 36.6 % (ref 36–46)
HDLC SERPL-MCNC: 43 MG/DL
HEMOGLOBIN: 12.5 G/DL (ref 12–16)
IMMATURE GRANULOCYTES: NORMAL %
LDL CHOLESTEROL: 95 MG/DL (ref 0–130)
LYMPHOCYTES # BLD: 29 % (ref 15–40)
MAGNESIUM: 1.9 MG/DL (ref 1.6–2.6)
MCH RBC QN AUTO: 30.8 PG (ref 26–34)
MCHC RBC AUTO-ENTMCNC: 34.2 G/DL (ref 31–37)
MCV RBC AUTO: 89.9 FL (ref 80–100)
MONOCYTES # BLD: 8 % (ref 4–8)
NRBC AUTOMATED: NORMAL PER 100 WBC
PATIENT FASTING?: YES
PDW BLD-RTO: 15.1 % (ref 12.1–15.2)
PLATELET # BLD: 277 K/UL (ref 140–450)
PLATELET ESTIMATE: NORMAL
PMV BLD AUTO: NORMAL FL (ref 6–12)
POTASSIUM SERPL-SCNC: 4.5 MMOL/L (ref 3.7–5.3)
RBC # BLD: 4.07 M/UL (ref 4–5.2)
RBC # BLD: NORMAL 10*6/UL
SEG NEUTROPHILS: 63 % (ref 47–75)
SEGMENTED NEUTROPHILS ABSOLUTE COUNT: 3.1 K/UL (ref 2.5–7)
SODIUM BLD-SCNC: 137 MMOL/L (ref 135–144)
TOTAL PROTEIN: 6.4 G/DL (ref 6.4–8.3)
TRIGL SERPL-MCNC: 170 MG/DL
TSH SERPL DL<=0.05 MIU/L-ACNC: 2.47 MIU/L (ref 0.3–5)
VITAMIN D 25-HYDROXY: 25 NG/ML (ref 30–100)
VLDLC SERPL CALC-MCNC: ABNORMAL MG/DL (ref 1–30)
WBC # BLD: 4.9 K/UL (ref 3.5–11)
WBC # BLD: NORMAL 10*3/UL

## 2021-05-27 PROCEDURE — 80061 LIPID PANEL: CPT

## 2021-05-27 PROCEDURE — 80053 COMPREHEN METABOLIC PANEL: CPT

## 2021-05-27 PROCEDURE — 85025 COMPLETE CBC W/AUTO DIFF WBC: CPT

## 2021-05-27 PROCEDURE — 83735 ASSAY OF MAGNESIUM: CPT

## 2021-05-27 PROCEDURE — 93005 ELECTROCARDIOGRAM TRACING: CPT

## 2021-05-27 PROCEDURE — 84443 ASSAY THYROID STIM HORMONE: CPT

## 2021-05-27 PROCEDURE — 82306 VITAMIN D 25 HYDROXY: CPT

## 2021-05-27 PROCEDURE — 36415 COLL VENOUS BLD VENIPUNCTURE: CPT

## 2021-05-29 LAB
EKG ATRIAL RATE: 60 BPM
EKG P AXIS: 59 DEGREES
EKG P-R INTERVAL: 200 MS
EKG Q-T INTERVAL: 416 MS
EKG QRS DURATION: 92 MS
EKG QTC CALCULATION (BAZETT): 416 MS
EKG R AXIS: 21 DEGREES
EKG T AXIS: 33 DEGREES
EKG VENTRICULAR RATE: 60 BPM

## 2021-05-29 PROCEDURE — 93010 ELECTROCARDIOGRAM REPORT: CPT | Performed by: INTERNAL MEDICINE

## 2021-06-01 ENCOUNTER — OFFICE VISIT (OUTPATIENT)
Dept: CARDIOLOGY CLINIC | Age: 61
End: 2021-06-01
Payer: COMMERCIAL

## 2021-06-01 VITALS — SYSTOLIC BLOOD PRESSURE: 145 MMHG | DIASTOLIC BLOOD PRESSURE: 82 MMHG | OXYGEN SATURATION: 97 % | HEART RATE: 80 BPM

## 2021-06-01 DIAGNOSIS — E55.9 VITAMIN D DEFICIENCY DISEASE: ICD-10-CM

## 2021-06-01 DIAGNOSIS — I10 ESSENTIAL HYPERTENSION: Primary | ICD-10-CM

## 2021-06-01 DIAGNOSIS — R00.2 PALPITATIONS: ICD-10-CM

## 2021-06-01 PROCEDURE — 99214 OFFICE O/P EST MOD 30 MIN: CPT | Performed by: INTERNAL MEDICINE

## 2021-06-01 RX ORDER — MULTIVIT-MIN/IRON/FOLIC ACID/K 18-600-40
2000 CAPSULE ORAL DAILY
COMMUNITY

## 2021-06-01 NOTE — PROGRESS NOTES
Ov Dr. Anita Valencia for 3 month follow up   No hospitalizations/procedures/er visits  No chest pain   No palpitations   Only wore event monitor 2 weeks   Take was tearing skin   Slight edema since warmer out now   Breathing same   Will be getting epidural on Friday   Dr. Balbina Crockett        Start taking Vit D 2000 units daily (OTC)    Will get EKG in 6 months     Follow up in one year

## 2021-06-01 NOTE — LETTER
Rubi Suh M.D. [address] [place] [address], [facility]  [phone]          2021          [address] [place], [place] [address], [facility]      RE:   Sincere Traylor  :  1960      Dear Kody Nails:    CHIEF COMPLAINT:  SVT. HISTORY OF PRESENT ILLNESS:  I had the pleasure of seeing Kendy Verma in our office on 2021. She is a pleasant 79-year-old female who has a history of hypertension, severe fibromyalgia and arthritis. Also has a history of SVT. On 2019, she had palpitations and shortness of breath, and a CT scan showed pulmonary embolism in the distal right pulmonary artery. Echocardiogram showed an EF of 60% to 65%, with dilated right atrium and right ventricle. She also had SVT at 170 beats per minute. She was discharged on 2019, placed on Eliquis 5 mg b.i.d., which she has remained. She went to the emergency room on 2019, and an EKG showed SVT at 151 beats per minute. She was converted to sinus rhythm and went on metoprolol. We recommended a sleep study. Another episode of SVT was on 2020, and I had placed her on flecainide 50 mg b.i.d. She had more edema, and therefore, we also increased Aldactone to 25 mg b.i.d.    I had seen her on 2021, and at that time, she had been treated for cellulitis and had heart rates in the 120 to 130 range. She was placed on amiodarone 200 mg b.i.d.  I saw her following this and I felt that she had, rather than SVT, a sinus tachycardia, and therefore, we stopped amiodarone and placed her back on flecainide at 50 mg b.i.d. We had her wear a 30-day event recorder, which did not show any SVT. I am seeing her back today. She overall is doing well. She has had no chest pain or chest discomfort or any unusual shortness of breath. She has had no PND, orthopnea, or pedal edema. She is getting a spinal injection on Friday.   She

## 2021-06-02 NOTE — PROGRESS NOTES
Brigida Snellen, M.D. [address] [place] [address], [place] [address]  [phone]          2021          [facility], [facility], [facility]      RE:   Martín Anderson  :  1960      Dear Nahum Call:    CHIEF COMPLAINT:  SVT. HISTORY OF PRESENT ILLNESS:  I had the pleasure of seeing Jean Marie Koch in our office on 2021. She is a pleasant 61-year-old female who has a history of hypertension, severe fibromyalgia and arthritis. Also has a history of SVT. On 2019, she had palpitations and shortness of breath, and a CT scan showed pulmonary embolism in the distal right pulmonary artery. Echocardiogram showed an EF of 60% to 65%, with dilated right atrium and right ventricle. She also had SVT at 170 beats per minute. She was discharged on 2019, placed on Eliquis 5 mg b.i.d., which she has remained. She went to the emergency room on 2019, and an EKG showed SVT at 151 beats per minute. She was converted to sinus rhythm and went on metoprolol. We recommended a sleep study. Another episode of SVT was on 2020, and I had placed her on flecainide 50 mg b.i.d. She had more edema, and therefore, we also increased Aldactone to 25 mg b.i.d.    I had seen her on 2021, and at that time, she had been treated for cellulitis and had heart rates in the 120 to 130 range. She was placed on amiodarone 200 mg b.i.d.  I saw her following this and I felt that she had, rather than SVT, a sinus tachycardia, and therefore, we stopped amiodarone and placed her back on flecainide at 50 mg b.i.d. We had her wear a 30-day event recorder, which did not show any SVT. I am seeing her back today. She overall is doing well. She has had no chest pain or chest discomfort or any unusual shortness of breath. She has had no PND, orthopnea, or pedal edema. She is getting a spinal injection on Friday.   She has been holding her Eliquis. She has had no further episodes of SVT. She denies any chest pain or chest discomfort. She has never had a myocardial infarction or cardiac catheterization. CARDIAC RISK FACTORS:  Hypertension:  Positive. Other Family Members:  Positive. Peripheral Vascular Disease:  Negative. Smoking:  Negative. Diabetes:  Negative. Hyperlipidemia:  Mildly positive. MEDICATIONS AT THIS TIME:  She is on Elavil 100 mg nightly, Eliquis 5 mg b.i.d., Celebrex 100 mg b.i.d., Cardizem  mg daily, Pepcid 20 mg b.i.d., Tambocor 50 mg b.i.d., Lasix 20 mg daily, Toprol-XL 50 mg daily, Requip 0.5 mg t.i.d., Aldactone 25 mg b.i.d. PAST MEDICAL AND SURGICAL HISTORY:  1. Left thyroid nodule on 07/25/2013. 2.  Hypertension. 3.  Fibromyalgia. 4.  DVT in the left leg in 2012. 5.  Depression. 6.  Left and right total knee replacement. 7.  Cholecystectomy on 08/22/2013.  8.  Right hip replacement in 2016.  9.  Cellulitis in 02/2021. FAMILY HISTORY:   Mother had atrial fibrillation. Father had CVA. Brother had thyroid disease and atrial fibrillation. SOCIAL HISTORY:  She is 64years old, , no children. Does not smoke or drink alcohol. Does not work outside the home. Her sister, Lucero Reyes, works in a sleep lab in [place]. REVIEW OF SYSTEMS:  Cardiac as above. Other systems reviewed including constitutional, eyes, ears, nose and throat, cardiovascular, respiratory, GI, , musculoskeletal, integumentary, neurologic, endocrine, hematologic and allergic/immunologic are negative except for what is described above. No weight loss or weight gain. No change in bowel habits, no blood in stools. No fevers, sweats or chills. PHYSICAL EXAMINATION:  VITAL SIGNS:  Her blood pressure was 145/82 with a heart rate of 80 and regular. Respiratory rate 18. O2 sat was 97%. GENERAL:  She is a pleasant 66-year-old female. Denied pain. She was oriented to person, place and time. Answered questions appropriately. SKIN:  No unusual skin changes. HEENT:  The pupils are equally round and intact. Mucous membranes were dry. NECK:  No JVD. Good carotid pulses. No carotid bruits. No lymphadenopathy or thyromegaly. CARDIOVASCULAR EXAM:  S1 and S2 were normal.  No S3 or S4. Soft systolic blowing type murmur. No diastolic murmur. PMI was normal.  No lift, thrust, or pericardial friction rub. LUNGS:  Quite clear to auscultation and percussion. ABDOMEN:  Soft and nontender. Good bowel sounds. EXTREMITIES:  She had 1+ edema. Good femoral pulses. Good pedal pulses. Skin was warm and dry. No calf tenderness. Nail beds pink. Good cap refill. LABORATORY DATA:  Her sodium was 137, potassium 4.5, BUN 25, creatinine 1.32. GFR was 41 about at baseline. Cholesterol 172, HDL 43, LDL 95, triglycerides 170. ALT was 17, AST was 16. TSH 2.47. Vitamin D 25. White count 4.9, hemoglobin 12.5 with a platelet count of 787,174. EKG showed normal sinus rhythm and was normal.    IMPRESSION:  1.  SVT, controlled with flecainide 50 mg b.i.d.  2.  Normal LV function, EF of 60%. 3.  Status post sleep study, which was evidently abnormal, although I do not have the results. 4.  Hypertension, well controlled. 5.  PE on 11/29/2019, unprovoked, with her on Eliquis 5 mg b.i.d. indefinitely. 6.  Chronic renal insufficiency, followed by Dr. Cydney Hester, which has been stable. 7.  Back pain, with an injection this coming Friday. PLAN:  1. No change in medications. 2.  EKG in 6 months. 3.  We will see her for a full followup in 1 year. DISCUSSION:  Marifer overall is doing well. She has had no chest pain, chest discomfort or any unusual shortness of breath. There is no indication to do any testing. Flecainide 50 mg b.i.d. is doing well with no further episodes. I am delighted on how she is doing from a cardiac standpoint. I will plan on seeing her in 1 year.     Thank you very much.    Sincerely,        Flaquito Quintana    D: 06/01/2021 14:52:10     T: 06/02/2021 6:38:13     [email or web address]  Job#: [id number]   Doc#: [id number]

## 2021-06-04 ENCOUNTER — APPOINTMENT (OUTPATIENT)
Dept: GENERAL RADIOLOGY | Age: 61
End: 2021-06-04
Attending: PHYSICAL MEDICINE & REHABILITATION
Payer: COMMERCIAL

## 2021-06-04 ENCOUNTER — HOSPITAL ENCOUNTER (OUTPATIENT)
Age: 61
Setting detail: OUTPATIENT SURGERY
Discharge: HOME | End: 2021-06-04
Attending: PHYSICAL MEDICINE & REHABILITATION | Admitting: PHYSICAL MEDICINE & REHABILITATION
Payer: COMMERCIAL

## 2021-06-04 ENCOUNTER — HOSPITAL ENCOUNTER (OUTPATIENT)
Dept: PREADMISSION TESTING | Age: 61
Setting detail: SPECIMEN
Discharge: HOME | End: 2021-06-04
Payer: COMMERCIAL

## 2021-06-04 VITALS
TEMPERATURE: 98.6 F | HEART RATE: 56 BPM | HEIGHT: 67 IN | OXYGEN SATURATION: 98 % | SYSTOLIC BLOOD PRESSURE: 132 MMHG | BODY MASS INDEX: 45.99 KG/M2 | RESPIRATION RATE: 20 BRPM | DIASTOLIC BLOOD PRESSURE: 69 MMHG | WEIGHT: 293 LBS

## 2021-06-04 LAB
SARS-COV-2, RAPID: NOT DETECTED
SPECIMEN DESCRIPTION: NORMAL

## 2021-06-04 PROCEDURE — 7100000011 HC PHASE II RECOVERY - ADDTL 15 MIN: Performed by: PHYSICAL MEDICINE & REHABILITATION

## 2021-06-04 PROCEDURE — 3600000054 HC PAIN LEVEL 3 BASE: Performed by: PHYSICAL MEDICINE & REHABILITATION

## 2021-06-04 PROCEDURE — C9803 HOPD COVID-19 SPEC COLLECT: HCPCS

## 2021-06-04 PROCEDURE — 99152 MOD SED SAME PHYS/QHP 5/>YRS: CPT | Performed by: PHYSICAL MEDICINE & REHABILITATION

## 2021-06-04 PROCEDURE — 6360000002 HC RX W HCPCS: Performed by: PHYSICAL MEDICINE & REHABILITATION

## 2021-06-04 PROCEDURE — 87635 SARS-COV-2 COVID-19 AMP PRB: CPT

## 2021-06-04 PROCEDURE — 2709999900 HC NON-CHARGEABLE SUPPLY: Performed by: PHYSICAL MEDICINE & REHABILITATION

## 2021-06-04 PROCEDURE — 3600000055 HC PAIN LEVEL 3 ADDL 15 MIN: Performed by: PHYSICAL MEDICINE & REHABILITATION

## 2021-06-04 PROCEDURE — 2500000003 HC RX 250 WO HCPCS: Performed by: PHYSICAL MEDICINE & REHABILITATION

## 2021-06-04 PROCEDURE — 2580000003 HC RX 258: Performed by: PHYSICAL MEDICINE & REHABILITATION

## 2021-06-04 PROCEDURE — 27096 INJECT SACROILIAC JOINT: CPT | Performed by: PHYSICAL MEDICINE & REHABILITATION

## 2021-06-04 PROCEDURE — 7100000010 HC PHASE II RECOVERY - FIRST 15 MIN: Performed by: PHYSICAL MEDICINE & REHABILITATION

## 2021-06-04 PROCEDURE — 76000 FLUOROSCOPY <1 HR PHYS/QHP: CPT

## 2021-06-04 RX ORDER — LIDOCAINE HYDROCHLORIDE 10 MG/ML
INJECTION, SOLUTION INFILTRATION; PERINEURAL PRN
Status: DISCONTINUED | OUTPATIENT
Start: 2021-06-04 | End: 2021-06-04 | Stop reason: ALTCHOICE

## 2021-06-04 RX ORDER — MIDAZOLAM HYDROCHLORIDE 1 MG/ML
INJECTION INTRAMUSCULAR; INTRAVENOUS PRN
Status: DISCONTINUED | OUTPATIENT
Start: 2021-06-04 | End: 2021-06-04 | Stop reason: ALTCHOICE

## 2021-06-04 RX ORDER — TRIAMCINOLONE ACETONIDE 40 MG/ML
INJECTION, SUSPENSION INTRA-ARTICULAR; INTRAMUSCULAR PRN
Status: DISCONTINUED | OUTPATIENT
Start: 2021-06-04 | End: 2021-06-04 | Stop reason: ALTCHOICE

## 2021-06-04 RX ORDER — SODIUM CHLORIDE, SODIUM LACTATE, POTASSIUM CHLORIDE, CALCIUM CHLORIDE 600; 310; 30; 20 MG/100ML; MG/100ML; MG/100ML; MG/100ML
INJECTION, SOLUTION INTRAVENOUS CONTINUOUS
Status: DISCONTINUED | OUTPATIENT
Start: 2021-06-04 | End: 2021-06-04 | Stop reason: HOSPADM

## 2021-06-04 RX ORDER — FENTANYL CITRATE 50 UG/ML
INJECTION, SOLUTION INTRAMUSCULAR; INTRAVENOUS PRN
Status: DISCONTINUED | OUTPATIENT
Start: 2021-06-04 | End: 2021-06-04 | Stop reason: ALTCHOICE

## 2021-06-04 RX ADMIN — SODIUM CHLORIDE, POTASSIUM CHLORIDE, SODIUM LACTATE AND CALCIUM CHLORIDE: 600; 310; 30; 20 INJECTION, SOLUTION INTRAVENOUS at 07:40

## 2021-06-04 ASSESSMENT — PAIN SCALES - GENERAL
PAINLEVEL_OUTOF10: 7
PAINLEVEL_OUTOF10: 6

## 2021-06-04 ASSESSMENT — PAIN DESCRIPTION - LOCATION
LOCATION: SHOULDER
LOCATION: SHOULDER

## 2021-06-04 ASSESSMENT — PAIN - FUNCTIONAL ASSESSMENT: PAIN_FUNCTIONAL_ASSESSMENT: 0-10

## 2021-06-04 NOTE — OP NOTE
Operative Note      Patient: Yaa José  YOB: 1960  MRN: [id number]    Date of Procedure: 6/4/2021    Pre-Op Diagnosis: CERVICAL RADICULOPATHY    Post-Op Diagnosis: Same       Procedure(s):  SACROILIAC JOINT INJECTION BILATERAL    Surgeon(s):  Danna Leyva MD    Assistant:   * No surgical staff found *    Anesthesia: IV Sedation    Estimated Blood Loss (mL): Minimal    Complications: None    Anesthesia: Moderate sedation using 3 mg IV versed & 100 mcg IV fentanyl. CONSENT:     The risks, benefits, alternatives, plan, complications, and personnel were discussed prior to the procedure. The patient understood and agreed to proceed. The patient was positioned prone. Sign in and time out was performed. Identifying the site, in this case, both sides sacroiliac joint. Sterile technique was done in the usual manner using chlorhexidine. This was followed by infiltration of a 2 cc of 1% preservative-free lidocaine. A 3.5 -inch, 22-gauge spinal needle was positioned under fluoroscopic guidance. Upon confirmation that the needle was properly positioned, 0.5 cc of 240 mg of Omnipaque was injected. No extraarticular spread was noted. This was followed by injecting solution of 40 mg of triamcinolone and  3 ml of 1% preservative-free lidocaine was injected without difficulty. Patient went to the recovery room with stable vital signs. Moderate Sedation Time: > 15 minutes with a nurse administering the medication(s) under my supervision. The patient was independently monitored by a Registered Nurse assigned to the Procedure Room ( automated blood pressure, continuous EKG, Capnography and continuous pulse oximetry)      PLAN:     Home instructions were provided. The patient will follow up in 4 to 6 weeks or earlier if needed.      Electronically signed by Danna Leyva MD on 6/4/2021 at 9:38 AM

## 2021-06-04 NOTE — H&P
Dariel Eli MD      [employer] Annel Wallace is a 64 y.o. female, who came in for a procedure,  Bilateral sacroiliac joint injection. No change since last visit       Treatment done: physical therapy, anti-inflammatory medication and muscle relaxant    Allergies   Allergen Reactions    Ampicillin Rash    Sulfa Antibiotics Rash       Social History     Socioeconomic History    Marital status:      Spouse name: Not on file    Number of children: Not on file    Years of education: Not on file    Highest education level: Not on file   Occupational History    Not on file   Tobacco Use    Smoking status: Never Smoker    Smokeless tobacco: Never Used   Vaping Use    Vaping Use: Never used   Substance and Sexual Activity    Alcohol use: No     Alcohol/week: 0.0 standard drinks     Comment: once in 10 years may have a glass wine    Drug use: No    Sexual activity: Yes     Partners: Male   Other Topics Concern    Not on file   Social History Narrative    Not on file     Social Determinants of Health     Financial Resource Strain: Low Risk     Difficulty of Paying Living Expenses: Not hard at all   Food Insecurity: No Food Insecurity    Worried About Running Out of Food in the Last Year: Never true    Neto of Food in the Last Year: Never true   Transportation Needs:     Lack of Transportation (Medical):      Lack of Transportation (Non-Medical):    Physical Activity:     Days of Exercise per Week:     Minutes of Exercise per Session:    Stress:     Feeling of Stress :    Social Connections:     Frequency of Communication with Friends and Family:     Frequency of Social Gatherings with Friends and Family:     Attends Yazdanism Services:     Active Member of Clubs or Organizations:     Attends Club or Organization Meetings:     Marital Status:    Intimate Partner Violence:     Fear of Current or Ex-Partner:     Emotionally Abused:     Physically Abused:     Sexually Abused: Past Medical History:   Diagnosis Date    Acid reflux     Bleeding tendency (HCC)     Blood type O+     Bronchitis     Chronic back pain     Depression     Diverticulitis     DVT (deep venous thrombosis) ([facility] [address] 2012    Left leg    Fibromyalgia     Hypertension     Internal hemorrhoid     Left thyroid nodule 7/25/2013    Nausea & vomiting     Pulmonary embolism [facility])        Past Surgical History:   Procedure Laterality Date    CHOLECYSTECTOMY  08/22/2013    laparoscopic    COLONOSCOPY      2012/ Dr. Dee Yuan Left        Prior to Visit Medications    Medication Sig Taking?  Authorizing Provider   famotidine (PEPCID) 20 MG tablet Take 1 tablet by mouth 2 times daily Yes ASHLEY Rios CNP   rOPINIRole (REQUIP) 0.5 MG tablet Take 1 tablet by mouth 3 times daily Yes ASHLEY Rios CNP   flecainide (TAMBOCOR) 50 MG tablet Take 50 mg by mouth 2 times daily WILL START IN THREE WEEKS, (AFTER BEING OFF AMIODARONE X 3 WEEKS) Yes Historical Provider, MD   amitriptyline (ELAVIL) 100 MG tablet Take 1 tablet by mouth nightly Yes ASHLEY Rios CNP   dilTIAZem (CARDIZEM CD) 120 MG extended release capsule Take 1 capsule by mouth daily Yes Suad Villalobos MD   spironolactone (ALDACTONE) 25 MG tablet Take 25 mg by mouth 2 times daily Yes Historical Provider, MD   celecoxib (CELEBREX) 100 MG capsule Take 1 capsule by mouth 2 times daily Yes ASHLEY Rios CNP   furosemide (LASIX) 20 MG tablet Take 1 tablet by mouth daily Yes Muna Romo MD   metoprolol succinate (TOPROL XL) 50 MG extended release tablet Take 1 tablet by mouth daily Yes Muna Romo MD   Cholecalciferol (VITAMIN D) 50 MCG (2000 UT) CAPS capsule Take 2,000 Units by mouth daily  Historical Provider, MD   apixaban (ELIQUIS) 5 MG TABS tablet Take 1 tablet by mouth 2 times daily  ASHLEY Rios CNP   Elastic Bandages & Supports (MEDICAL COMPRESSION STOCKINGS) MISC 1 each by Does not apply route daily Knee high 20mmHg  Patient not taking: Reported on 5/19/2021  Tory Hunt, APRN - CNP       Family History   Problem Relation Age of Onset    Stroke Father     Diabetes Sister     Thyroid Disease Brother     Atrial Fibrillation Brother     Atrial Fibrillation Mother          Review of Systems : All systems reviewed, all unremarkable other than HPI. No change since last visit. Physical Exam  Constitutional:       General: She is not in acute distress. HENT:      Head: Atraumatic. Cardiovascular:      Rate and Rhythm: Normal rate. Pulmonary:      Effort: Pulmonary effort is normal. No respiratory distress. Neurological:      Mental Status: She is alert and oriented to person, place, and time. Psychiatric:         Behavior: Behavior normal.       Cervical Spine Exam: Full ROM, without limitation     Access: Adequate mouth opening       Assessment:   Lumbar spondylosis     PLAN:     As advertised. Planned duration of treatment: 4 weeks. Further assessment and followup in  4 weeks for follow up post injection.        Electronically signed by Cameron Eagle MD on 6/4/2021 at 8:14 AM

## 2021-06-04 NOTE — PROGRESS NOTES

## 2021-06-07 ENCOUNTER — PATIENT MESSAGE (OUTPATIENT)
Dept: FAMILY MEDICINE CLINIC | Age: 61
End: 2021-06-07

## 2021-06-07 RX ORDER — FAMOTIDINE 20 MG/1
20 TABLET, FILM COATED ORAL 2 TIMES DAILY
Qty: 60 TABLET | Refills: 2 | Status: SHIPPED | OUTPATIENT
Start: 2021-06-07 | End: 2021-08-30 | Stop reason: SDUPTHER

## 2021-06-07 RX ORDER — DILTIAZEM HYDROCHLORIDE 120 MG/1
120 CAPSULE, COATED, EXTENDED RELEASE ORAL DAILY
Qty: 30 CAPSULE | Refills: 3 | Status: SHIPPED | OUTPATIENT
Start: 2021-06-07 | End: 2021-08-16

## 2021-06-07 NOTE — TELEPHONE ENCOUNTER
Joshua's patient needs refill of her Medication       Last OV: 5/19/2021 for cellulitus  Last RX:   Next scheduled apt: Visit date not found

## 2021-06-07 NOTE — TELEPHONE ENCOUNTER
From: Doreen Jeong  To: ASHLEY Zuñiga - CNP  Sent: 6/7/2021 10:04 AM EDT  Subject: Prescription Question    I need to reorder diltiazem and it says I can't.  Why?

## 2021-07-08 DIAGNOSIS — I10 ESSENTIAL HYPERTENSION: ICD-10-CM

## 2021-07-09 ENCOUNTER — OFFICE VISIT (OUTPATIENT)
Dept: FAMILY MEDICINE CLINIC | Age: 61
End: 2021-07-09
Payer: COMMERCIAL

## 2021-07-09 ENCOUNTER — OFFICE VISIT (OUTPATIENT)
Dept: PAIN MANAGEMENT | Age: 61
End: 2021-07-09
Payer: COMMERCIAL

## 2021-07-09 VITALS
TEMPERATURE: 96.4 F | OXYGEN SATURATION: 97 % | HEART RATE: 70 BPM | WEIGHT: 293 LBS | SYSTOLIC BLOOD PRESSURE: 138 MMHG | DIASTOLIC BLOOD PRESSURE: 82 MMHG | BODY MASS INDEX: 55.29 KG/M2

## 2021-07-09 VITALS
DIASTOLIC BLOOD PRESSURE: 84 MMHG | BODY MASS INDEX: 55.35 KG/M2 | OXYGEN SATURATION: 96 % | WEIGHT: 293 LBS | SYSTOLIC BLOOD PRESSURE: 140 MMHG

## 2021-07-09 DIAGNOSIS — L03.116 CELLULITIS OF LEFT LEG WITHOUT FOOT: Primary | ICD-10-CM

## 2021-07-09 DIAGNOSIS — M47.812 CERVICAL SPONDYLOSIS: Primary | ICD-10-CM

## 2021-07-09 PROCEDURE — 99214 OFFICE O/P EST MOD 30 MIN: CPT | Performed by: STUDENT IN AN ORGANIZED HEALTH CARE EDUCATION/TRAINING PROGRAM

## 2021-07-09 PROCEDURE — 99213 OFFICE O/P EST LOW 20 MIN: CPT | Performed by: PHYSICAL MEDICINE & REHABILITATION

## 2021-07-09 RX ORDER — CEPHALEXIN 500 MG/1
500 CAPSULE ORAL 4 TIMES DAILY
Qty: 20 CAPSULE | Refills: 0 | Status: SHIPPED | OUTPATIENT
Start: 2021-07-09 | End: 2021-07-14

## 2021-07-09 ASSESSMENT — ENCOUNTER SYMPTOMS
COUGH: 0
VOMITING: 0
ABDOMINAL PAIN: 0
SINUS PAIN: 0
BACK PAIN: 0
RHINORRHEA: 0
COLOR CHANGE: 1
DIARRHEA: 0
WHEEZING: 0
NAUSEA: 0

## 2021-07-09 NOTE — PROGRESS NOTES
FOLLOW UP APPOINTMENT:             7/9/2021       Rafy Sommer is a 64 y.o. female, who came for a  follow up to discuss treatment options and  post injection follow up    Cause of the symptom(s): degenerative (arthritis)    Onset: 20+ years      Prior reatment done: physical therapy, injection and anti-inflammatory medication    Current pain level: 3 on the scale of 0-10 ( 10 being worst)     Quality of Symptoms: aching    Aggravating factors: activity, lifting, twisting, bending forward and bending backward    Relieving factors: Chiropractor and use of medication        Allergies   Allergen Reactions    Ampicillin Rash    Sulfa Antibiotics Rash       Social History     Socioeconomic History    Marital status:      Spouse name: Not on file    Number of children: Not on file    Years of education: Not on file    Highest education level: Not on file   Occupational History    Not on file   Tobacco Use    Smoking status: Never Smoker    Smokeless tobacco: Never Used   Vaping Use    Vaping Use: Never used   Substance and Sexual Activity    Alcohol use: No     Alcohol/week: 0.0 standard drinks     Comment: once in 10 years may have a glass wine    Drug use: No    Sexual activity: Yes     Partners: Male   Other Topics Concern    Not on file   Social History Narrative    Not on file     Social Determinants of Health     Financial Resource Strain: Low Risk     Difficulty of Paying Living Expenses: Not hard at all   Food Insecurity: No Food Insecurity    Worried About Running Out of Food in the Last Year: Never true    Neto of Food in the Last Year: Never true   Transportation Needs:     Lack of Transportation (Medical):      Lack of Transportation (Non-Medical):    Physical Activity:     Days of Exercise per Week:     Minutes of Exercise per Session:    Stress:     Feeling of Stress :    Social Connections:     Frequency of Communication with Friends and Family:     Frequency of Social (AFTER BEING OFF AMIODARONE X 3 WEEKS)  Historical Provider, MD   amitriptyline (ELAVIL) 100 MG tablet Take 1 tablet by mouth nightly  ASHLEY Traore CNP   spironolactone (ALDACTONE) 25 MG tablet Take 25 mg by mouth 2 times daily  Historical Provider, MD   apixaban (ELIQUIS) 5 MG TABS tablet Take 1 tablet by mouth 2 times daily  ASHLEY Traore CNP   celecoxib (CELEBREX) 100 MG capsule Take 1 capsule by mouth 2 times daily  ASHLEY Traore CNP   furosemide (LASIX) 20 MG tablet Take 1 tablet by mouth daily  Joanne Trevino MD   metoprolol succinate (TOPROL XL) 50 MG extended release tablet Take 1 tablet by mouth daily  Joanne Trevino MD   Elastic Bandages & Supports (MEDICAL COMPRESSION STOCKINGS) [facility] 1 each by Does not apply route daily Knee high 20mmHg  Patient not taking: Reported on 5/19/2021  ASHLEY Traore CNP         Review of Systems : All systems reviewed, all unremarkable other than HPI/subjective. No change since last visit. Denies  fever, chills, infection or non healing wound. BP (!) 140/84   Wt (!) 353 lb 6.4 oz (160.3 kg)   SpO2 96%   BMI 55.35 kg/m²       Physical Exam  Constitutional:       Appearance: She is obese. HENT:      Head: Atraumatic. Pulmonary:      Effort: Pulmonary effort is normal.   Musculoskeletal:         General: Tenderness present. Cervical back: Neck supple. Comments: Cervical paraspinals    Neurological:      Mental Status: She is alert and oriented to person, place, and time. Psychiatric:         Behavior: Behavior normal.       Assessment and Plan:      Diagnosis Orders   1. Cervical spondylosis  XR CERVICAL SPINE (2-3 VIEWS)       She reports relief for at least 50% better for 3 weeks post SI joint injection bilaterally on 6/4/2021. Doing well with tylenol. Tried tramadol but it is not as helpful. Of note, she reports neck and back pain.       FF up in 2 weeks to discuss X ray result  - via telemed. All questions were answered and imaging studies reviewed with the patient. I have reviewed the chief complaint and HPI including the [facility] and Vital documentation by my staff and I agree with their documentation and have added where applicable. Time spent with patient was 25  minutes. More than 50% was spent counseling/coordinating the patient's care.          Sydni Gonzalez MD   Spine Medicine/PM&R

## 2021-07-09 NOTE — Clinical Note
Juan Lewis, I did want to send you this chart, would you mind seeing this patient in 1 week? She has cellulitis and I treated her with Keflex.

## 2021-07-09 NOTE — PROGRESS NOTES
HPI Notes    Name: Sabrina De Guzman  : 1960         Chief Complaint:     Chief Complaint   Patient presents with    Leg Pain     Patient c/o left leg painful and swollen x 3 days. History of Present Illness:        HPI    This is a 57-year-old woman presenting for evaluation of left lower extremity swelling and pain since Wednesday night, over the past 3 days. There has been no trauma to this extremity, she is not endorsing fevers, but does endorse some chills, along with increased erythema, warmth. She admits to not using compression stockings, and not elevating her legs. She does not moisturize her legs. Past Medical History:     Past Medical History:   Diagnosis Date    Acid reflux     [facility]eding tendency ([facility])     Blood type O+     Bronchitis     Chronic back pain     Depression     Diverticulitis     DVT (deep venous thrombosis) ([facility])     Left leg    Fibromyalgia     Hypertension     Internal hemorrhoid     Left thyroid nodule 2013    Nausea & vomiting     Pulmonary embolism ([facility])       Reviewed all health maintenance requirements and ordered appropriate tests  Health Maintenance Due   Topic Date Due    COVID-19 Vaccine (1) Never done    DTaP/Tdap/Td vaccine (1 - Tdap) Never done    Shingles Vaccine (1 of 2) Never done    Cervical cancer screen  09/15/2017    Breast cancer screen  05/10/2018       Past Surgical History:     Past Surgical History:   Procedure Laterality Date    BACK INJECTION Bilateral 2021    SACROILIAC JOINT INJECTION BILATERAL performed by Pina Reynoso MD at [address] [place] [address]  2013    laparoscopic    COLONOSCOPY      / Dr. Lashawn Holley Left         Medications:       Prior to Admission medications    Medication Sig Start Date End Date Taking?  Authorizing Provider   famotidine (PEPCID) 20 MG tablet Take 1 tablet by mouth 2 times daily 21  Yes Feng Packer DO H[facility]ctor   dilTIAZem (CARDIZEM CD) 120 MG extended release capsule Take 1 capsule by mouth daily 6/7/21  Yes Kira Bowen DO   Cholecalciferol (VITAMIN D) 50 MCG (2000 UT) CAPS capsule Take 2,000 Units by mouth daily   Yes Historical Provider, MD   rOPINIRole (REQUIP) 0.5 MG tablet Take 1 tablet by mouth 3 times daily 5/3/21  Yes ASHLEY Lau CNP   flecainide (TAMBOCOR) 50 MG tablet Take 50 mg by mouth 2 times daily WILL START IN THREE WEEKS, (AFTER BEING OFF AMIODARONE X 3 WEEKS)   Yes Historical Provider, MD   amitriptyline (ELAVIL) 100 MG tablet Take 1 tablet by mouth nightly 2/24/21  Yes ASHLEY Lau CNP   spironolactone (ALDACTONE) 25 MG tablet Take 25 mg by mouth 2 times daily   Yes Historical Provider, MD   apixaban (ELIQUIS) 5 MG TABS tablet Take 1 tablet by mouth 2 times daily 1/28/21  Yes ASHLEY Lau CNP   celecoxib (CELEBREX) 100 MG capsule Take 1 capsule by mouth 2 times daily 1/25/21  Yes ASHLEY Lau CNP   furosemide (LASIX) 20 MG tablet Take 1 tablet by mouth daily 1/25/21  Yes Saeid Lazo MD   metoprolol succinate (TOPROL XL) 50 MG extended release tablet Take 1 tablet by mouth daily 7/27/20  Yes Saeid Lazo MD   Elastic Bandages & Supports ([facility] ) [address] 1 each by Does not apply route daily Knee high 20mmHg  Patient not taking: Reported on 5/19/2021 1/3/19   ASHLEY Lau CNP        Allergies:       Ampicillin and Sulfa antibiotics    Social History:     Tobacco:    reports that she has never smoked. She has never used smokeless tobacco.  Alcohol:      reports no history of alcohol use. Drug Use:  reports no history of drug use.     Family History:     Family History   Problem Relation Age of Onset    Stroke Father     Diabetes Sister     Thyroid Disease Brother     Atrial Fibrillation Brother     Atrial Fibrillation Mother        Review of Systems:       Review of Systems Constitutional: Negative for fever. HENT: Negative for rhinorrhea, sinus pain and sneezing. Respiratory: Negative for cough and wheezing. Cardiovascular: Positive for leg swelling. Negative for chest pain. Gastrointestinal: Negative for abdominal pain, diarrhea, nausea and vomiting. Musculoskeletal: Negative for back pain. Left lower extremity pain   Skin: Positive for color change and rash. Neurological: Negative for headaches. Psychiatric/Behavioral: Negative for sleep disturbance. Physical Exam:     Vitals:  /82   Pulse 70   Temp 96.4 °F (35.8 °C) (Temporal)   Wt (!) 353 lb (160.1 kg)   SpO2 97%   BMI 55.29 kg/m²       Physical Exam  Vitals and nursing note reviewed. Constitutional:       General: She is not in acute distress. Appearance: Normal appearance. Musculoskeletal:      Left lower leg: Edema present. Skin:     General: Skin is warm and dry. Capillary Refill: Capillary refill takes less than 2 seconds. Findings: Erythema and rash present. Comments: Stasis dermatitis bilaterally. LLE erythematous, hot, mild induration, nonpitting edema, with scale on posterior LLE. Neurological:      General: No focal deficit present. Mental Status: She is alert and oriented to person, place, and time. Mental status is at baseline. Cranial Nerves: No cranial nerve deficit. Psychiatric:         Mood and Affect: Mood normal.         Behavior: Behavior normal.         Thought Content:  Thought content normal.                     Data:     Lab Results   Component Value Date     05/27/2021    K 4.5 05/27/2021     05/27/2021    CO2 26 05/27/2021    BUN 25 05/27/2021    CREATININE 1.32 05/27/2021    GLUCOSE 119 05/27/2021    PROT 6.4 05/27/2021    LABALBU 3.6 05/27/2021    BILITOT 0.43 05/27/2021    ALKPHOS 126 05/27/2021    AST 16 05/27/2021    ALT 17 05/27/2021     Lab Results   Component Value Date    WBC 4.9 05/27/2021    RBC 4.07 05/27/2021    RBC 4.17 05/15/2012    HGB 12.5 05/27/2021    HCT 36.6 05/27/2021    MCV 89.9 05/27/2021    MCH 30.8 05/27/2021    MCHC 34.2 05/27/2021    RDW 15.1 05/27/2021     05/27/2021     05/15/2012    MPV NOT REPORTED 05/27/2021     Lab Results   Component Value Date    TSH 2.47 05/27/2021     Lab Results   Component Value Date    CHOL 172 05/27/2021    CHOL 207 05/23/2019    HDL 43 05/27/2021    LABA1C 6.1 10/27/2020          Assessment & Plan        Diagnosis Orders   1. Cellulitis of left leg without foot         1. History and physical examination are most concerning for cellulitis, would recommend treatment with systemic antibiotic therapy. Would recommend cephalexin 500 mg p.o. 4 times daily x5 days. Renal dosing unnecessary, as her eGFR is greater than 30 mL/min    Would recommend she return to the office in 1 week to have a reevaluation with Krishna Hurt CNP. We will send chart for his review. Completed Refills   Requested Prescriptions      No prescriptions requested or ordered in this encounter     Return in about 1 week (around 7/16/2021) for LLE cellulitis recheck. No orders of the defined types were placed in this encounter. No orders of the defined types were placed in this encounter. Patient Instructions   Marifer,  I certainly am concerned that you have cellulitis in the left leg. I'd like to treat you with an antibiotic, we'll use   For the legs, I really can't emphasize how important elevating them and wearing compression stockings will be. I'd also recommend you moisturize them daily with either Cerave, Lubriderm or Eucerin ointment. Come back in 1 week to see Chandler Fonseca for a recheck of the leg  Dr. Yfn Solomon:    You may be receiving a survey from [employer] regarding your visit today. Please complete the survey to enable us to provide the highest quality of care to you and your family.     If you cannot score us a very good on any question,

## 2021-07-09 NOTE — PATIENT INSTRUCTIONS
Marifer,  I certainly am concerned that you have cellulitis in the left leg. I'd like to treat you with an antibiotic, we'll use cephalexin 500 mg 4 times a day for 5 days  For the legs, I really can't emphasize how important elevating them and wearing compression stockings will be. I'd also recommend you moisturize them daily with either Cerave, Lubriderm or Eucerin ointment. Come back in 1 week to see [facility] for a recheck of the leg  Dr. Genaro Quinn:    You may be receiving a survey from [employer] regarding your visit today. Please complete the survey to enable us to provide the highest quality of care to you and your family. If you cannot score us a very good on any question, please call the office to discuss how we could of made your experience a very good one. Thank you.       Clinical Care Team:     Dr. Gloria Márquez, [facility]      ClericalTeam:     Susan Beltran     [address]

## 2021-07-12 RX ORDER — FUROSEMIDE 20 MG/1
TABLET ORAL
Qty: 90 TABLET | Refills: 10 | Status: SHIPPED | OUTPATIENT
Start: 2021-07-12 | End: 2022-09-06

## 2021-07-13 ENCOUNTER — PATIENT MESSAGE (OUTPATIENT)
Dept: FAMILY MEDICINE CLINIC | Age: 61
End: 2021-07-13

## 2021-07-13 RX ORDER — ROPINIROLE 0.5 MG/1
TABLET, FILM COATED ORAL
Qty: 270 TABLET | Refills: 1 | Status: SHIPPED | OUTPATIENT
Start: 2021-07-13 | End: 2022-01-31 | Stop reason: SDUPTHER

## 2021-07-13 RX ORDER — CELECOXIB 100 MG/1
100 CAPSULE ORAL 2 TIMES DAILY
Qty: 180 CAPSULE | Refills: 1 | Status: SHIPPED | OUTPATIENT
Start: 2021-07-13 | End: 2021-12-02 | Stop reason: SDUPTHER

## 2021-07-13 NOTE — TELEPHONE ENCOUNTER
From: Bebeto Pruett  To: ASHLEY Sagastume CNP  Sent: 7/13/2021 3:51 PM EDT  Subject: Prescription Question    I need a prescription for celebrex to be sent to script sourcing.  Fax number  [phone]  Thank you

## 2021-07-16 ENCOUNTER — OFFICE VISIT (OUTPATIENT)
Dept: FAMILY MEDICINE CLINIC | Age: 61
End: 2021-07-16
Payer: COMMERCIAL

## 2021-07-16 VITALS
OXYGEN SATURATION: 99 % | HEART RATE: 76 BPM | HEIGHT: 67 IN | TEMPERATURE: 97.6 F | WEIGHT: 293 LBS | DIASTOLIC BLOOD PRESSURE: 80 MMHG | BODY MASS INDEX: 45.99 KG/M2 | SYSTOLIC BLOOD PRESSURE: 134 MMHG

## 2021-07-16 DIAGNOSIS — L03.116 CELLULITIS OF LEFT LEG WITHOUT FOOT: Primary | ICD-10-CM

## 2021-07-16 PROCEDURE — 99213 OFFICE O/P EST LOW 20 MIN: CPT | Performed by: NURSE PRACTITIONER

## 2021-07-16 ASSESSMENT — ENCOUNTER SYMPTOMS
VOMITING: 0
DIARRHEA: 0
SHORTNESS OF BREATH: 0
NAUSEA: 0
COUGH: 0

## 2021-07-16 NOTE — PROGRESS NOTES
HPI Notes    Name: Kayla Lazaro  : 1960         Chief Complaint:     Chief Complaint   Patient presents with    Cellulitis     LLE, started last Thursday. compression stockings painful       History of Present Illness:        HPI  Pt is a 65 yo female who presents for follow up. Pt was seen on 21 and dx with cellulitis left lower leg. Pt was started on keflex. Pt states that the cellulitis is resolving. Still has some swelling in her legs. Tried to do compression stockings for 3 days, but they were too painful. Past Medical History:     Past Medical History:   Diagnosis Date    Acid reflux     Bleeding tendency (HCC)     Blood type O+     Bronchitis     Chronic back pain     Depression     Diverticulitis     [facility]) ([facility])     Left leg    Fibromyalgia     Hypertension     Internal hemorrhoid     Left thyroid nodule 2013    Nausea & vomiting     Pulmonary embolism ([facility])       Reviewed all health maintenance requirements and ordered appropriate tests  Health Maintenance Due   Topic Date Due    COVID-19 Vaccine (1) Never done    DTaP/Tdap/Td vaccine (1 - Tdap) Never done    Shingles Vaccine (1 of 2) Never done    Cervical cancer screen  09/15/2017    Breast cancer screen  05/10/2018       Past Surgical History:     Past Surgical History:   Procedure Laterality Date    BACK INJECTION Bilateral 2021    SACROILIAC JOINT INJECTION BILATERAL performed by Tanner Carrero MD at [facility]  2013    laparoscopic    COLONOSCOPY      / Dr. Drew Villalobos Left         Medications:       Prior to Admission medications    Medication Sig Start Date End Date Taking?  Authorizing Provider   rOPINIRole (REQUIP) 0.5 MG tablet TAKE ONE TABLET BY MOUTH THREE TIMES A DAY 21  Yes Kelly Melvin MD   celecoxib (CELEBREX) 100 MG capsule Take 1 capsule by mouth 2 times daily 21 Yes Adan Saleh MD   furosemide (LASIX) 20 MG tablet TAKE ONE TABLET BY MOUTH DAILY 7/12/21  Yes Pamela Linder MD   famotidine (PEPCID) 20 MG tablet Take 1 tablet by mouth 2 times daily 6/7/21  Yes Yamilet Malcolm DO   dilTIAZem (CARDIZEM CD) 120 MG extended release capsule Take 1 capsule by mouth daily 6/7/21  Yes Yamilet Malcolm DO   Cholecalciferol (VITAMIN D) 50 MCG (2000 UT) CAPS capsule Take 2,000 Units by mouth daily   Yes Historical Provider, MD   flecainide (TAMBOCOR) 50 MG tablet Take 50 mg by mouth 2 times daily WILL START IN THREE WEEKS, (AFTER BEING OFF AMIODARONE X 3 WEEKS)   Yes Historical Provider, MD   amitriptyline (ELAVIL) 100 MG tablet Take 1 tablet by mouth nightly 2/24/21  Yes ASHLEY Ag CNP   spironolactone (ALDACTONE) 25 MG tablet Take 25 mg by mouth 2 times daily   Yes Historical Provider, MD   apixaban (ELIQUIS) 5 MG TABS tablet Take 1 tablet by mouth 2 times daily 1/28/21  Yes ASHLEY Ag CNP   metoprolol succinate (TOPROL XL) 50 MG extended release tablet Take 1 tablet by mouth daily 7/27/20  Yes Pamela Linder MD   Elastic Bandages & Supports ([address]) [facility] 1 each by Does not apply route daily Knee high 20mmHg 1/3/19  Yes ASHLEY Ag CNP        Allergies:       Ampicillin and Sulfa antibiotics    Social History:     Tobacco:    reports that she has never smoked. She has never used smokeless tobacco.  Alcohol:      reports no history of alcohol use. Drug Use:  reports no history of drug use. Family History:        Family History   Problem Relation Age of Onset    Stroke Father     Diabetes Sister     Thyroid Disease Brother     Atrial Fibrillation Brother     Atrial Fibrillation Mother        Review of Systems:         Review of Systems   Constitutional: Negative for chills and fever. Respiratory: Negative for cough and shortness of breath.     Cardiovascular: Negative for chest pain and palpitations. Gastrointestinal: Negative for diarrhea, nausea and vomiting. Skin: Positive for rash (bilat legs). Neurological: Negative for dizziness, seizures and headaches. Physical Exam:     Vitals:  /80   Pulse 76   Temp 97.6 °F (36.4 °C)   Ht 5' 7\" (1.702 m)   Wt (!) 355 lb (161 kg)   SpO2 99%   BMI 55.60 kg/m²       Physical Exam  Vitals and nursing note reviewed. Constitutional:       Appearance: Normal appearance. She is well-developed. Cardiovascular:      Rate and Rhythm: Normal rate and regular rhythm. Heart sounds: Normal heart sounds, S1 normal and S2 normal.   Pulmonary:      Effort: Pulmonary effort is normal. No respiratory distress. Breath sounds: Normal breath sounds. Abdominal:      General: Bowel sounds are normal.      Palpations: Abdomen is soft. Tenderness: There is no abdominal tenderness. Skin:     General: Skin is warm and dry. Comments: Erythema and radha appearance to left lower anterior leg. No warmth. 2+ edema   Neurological:      Mental Status: She is alert and oriented to person, place, and time.                Data:     Lab Results   Component Value Date     05/27/2021    K 4.5 05/27/2021     05/27/2021    CO2 26 05/27/2021    BUN 25 05/27/2021    CREATININE 1.32 05/27/2021    GLUCOSE 119 05/27/2021    PROT 6.4 05/27/2021    LABALBU 3.6 05/27/2021    BILITOT 0.43 05/27/2021    ALKPHOS 126 05/27/2021    AST 16 05/27/2021    ALT 17 05/27/2021     Lab Results   Component Value Date    WBC 4.9 05/27/2021    RBC 4.07 05/27/2021    RBC 4.17 05/15/2012    HGB 12.5 05/27/2021    HCT 36.6 05/27/2021    MCV 89.9 05/27/2021    MCH 30.8 05/27/2021    MCHC 34.2 05/27/2021    RDW 15.1 05/27/2021     05/27/2021     05/15/2012    MPV NOT REPORTED 05/27/2021     Lab Results   Component Value Date    TSH 2.47 05/27/2021     Lab Results   Component Value Date    CHOL 172 05/27/2021    CHOL 207 05/23/2019    HDL 43 05/27/2021    LABA1C 6.1 10/27/2020          Assessment & Plan        Diagnosis Orders   1. Cellulitis of left leg without foot       Patient completed Keflex as directed. Patient believes the area is much improved. There is still radha discoloration, however I believe this to be her baseline. Patient was unable to tolerate compression stockings. Patient educated to continue with diuretics, elevation of legs, and weight reduction. Completed Refills   Requested Prescriptions      No prescriptions requested or ordered in this encounter     No follow-ups on file. No orders of the defined types were placed in this encounter. No orders of the defined types were placed in this encounter. Patient Instructions   SURVEY:    You may be receiving a survey from [employer] regarding your visit today. You may get this in the mail, through your MyChart or in your email. Please complete the survey to enable us to provide the highest quality of care to you and your family. If you cannot score us as very good ( 5 Stars) on any question, please feel free to call the office to discuss how we could have made your experience exceptional.     Thank you. Clinical Care Team:         EDWIN Gan, [address] [place] [address] Team:  [address]                                 Electronically signed by ASHLEY Gan CNP on 7/16/2021 at 9:52 AM           Completed Refills      Requested Prescriptions      No prescriptions requested or ordered in this encounter         Marifer received counseling on the following healthy behaviors: medication adherence, nutrition, exercise  Reviewed prior labs and health maintenance. Continue current medications, diet and exercise. Discussed use, benefit, and side effects of prescribed medications. Barriers to medication compliance addressed.    Patient given educational materials - see patient instructions. All patient questions answered. Patient voiced understanding.

## 2021-07-16 NOTE — PATIENT INSTRUCTIONS
SURVEY:    You may be receiving a survey from [employer] regarding your visit today. You may get this in the mail, through your MyChart or in your email. Please complete the survey to enable us to provide the highest quality of care to you and your family. If you cannot score us as very good ( 5 Stars) on any question, please feel free to call the office to discuss how we could have made your experience exceptional.     Thank you.     Clinical Care Team:         ASHLEY Cruz-CNP                                           [facility], [address] [place] [address] Team:  James Langford

## 2021-07-26 ENCOUNTER — HOSPITAL ENCOUNTER (OUTPATIENT)
Dept: GENERAL RADIOLOGY | Age: 61
Discharge: HOME | End: 2021-07-28
Payer: COMMERCIAL

## 2021-07-26 ENCOUNTER — HOSPITAL ENCOUNTER (OUTPATIENT)
Age: 61
Discharge: HOME | End: 2021-07-28
Payer: COMMERCIAL

## 2021-07-26 DIAGNOSIS — M47.812 CERVICAL SPONDYLOSIS: ICD-10-CM

## 2021-07-26 PROCEDURE — 72050 X-RAY EXAM NECK SPINE 4/5VWS: CPT

## 2021-07-29 ENCOUNTER — OFFICE VISIT (OUTPATIENT)
Dept: PRIMARY CARE CLINIC | Age: 61
End: 2021-07-29
Payer: COMMERCIAL

## 2021-07-29 VITALS
SYSTOLIC BLOOD PRESSURE: 118 MMHG | OXYGEN SATURATION: 97 % | BODY MASS INDEX: 45.99 KG/M2 | RESPIRATION RATE: 20 BRPM | HEIGHT: 67 IN | TEMPERATURE: 98.5 F | WEIGHT: 293 LBS | HEART RATE: 76 BPM | DIASTOLIC BLOOD PRESSURE: 87 MMHG

## 2021-07-29 DIAGNOSIS — L03.116 CELLULITIS OF LEFT LOWER EXTREMITY: Primary | ICD-10-CM

## 2021-07-29 PROCEDURE — 99213 OFFICE O/P EST LOW 20 MIN: CPT | Performed by: NURSE PRACTITIONER

## 2021-07-29 RX ORDER — CEPHALEXIN 500 MG/1
500 CAPSULE ORAL 4 TIMES DAILY
Qty: 28 CAPSULE | Refills: 0 | Status: SHIPPED | OUTPATIENT
Start: 2021-07-29 | End: 2021-08-05

## 2021-07-29 NOTE — PROGRESS NOTES
Chief Complaint:   Cellulitis (left leg x 2 days )      History of Present Illness   Source of history provided by:  patient. Jeanie Dill is a 64 y.o. old female who has a past medical history of:   Past Medical History:   Diagnosis Date    Acid reflux     Bleeding tendency (HCC)     Blood type O+     Bronchitis     Chronic back pain     Depression     Diverticulitis     DVT (deep venous thrombosis) ([facility] [address] 2012    Left leg    Fibromyalgia     Hypertension     Internal hemorrhoid     Left thyroid nodule 7/25/2013    Nausea & vomiting     Pulmonary embolism (HCC)     Presents to the walk in clinic for evaluation of redness to the left lower leg, which began to days ago. Reports the area is warm to touch, non painful, and swollen. No lymphangitic streaking. Denies any bleeding or active drainage. Since onset the symptoms have progressed. Denies any fever, chills, HA, recent illness, myalgias, nausea, vomiting, or lethargy. According to [facility], there has been no injury to the foot or leg. She reports that she has had cellulitis in this leg in the past.  She has tried using compression stockings but finds them too restricting with swelling. ROS   Unless otherwise stated in this report or unable to obtain because of the patient's clinical or mental status as evidenced by the medical record, this patients's positive and negative responses for Review of Systems, constitutional, psych, eyes, ENT, cardiovascular, respiratory, gastrointestinal, neurological, genitourinary, musculoskeletal, integument systems and systems related to the presenting problem are either stated in the preceding or were not pertinent or were negative for the symptoms and/or complaints related to the medical problem. Past Surgical History:  has a past surgical history that includes Colonoscopy; Total knee arthroplasty (Right); Total knee arthroplasty (Left);  Cholecystectomy (08/22/2013); and Back Injection (Bilateral, use.  Strict ED follow-up with the development of fever, body aches, spreading erythema, lymphangitic streaking, inability to retain antibiotics, lethargy, CP, or SOB. Pt is in agreement with this care plan. All questions answered.     Sierra Fritz, ASHLEY - CNP

## 2021-07-29 NOTE — PATIENT INSTRUCTIONS
Patient Education        Cellulitis: Care Instructions  Your Care Instructions     Cellulitis is a skin infection caused by bacteria, most often strep or staph. It often occurs after a break in the skin from a scrape, cut, bite, or puncture, or after a rash. Cellulitis may be treated without doing tests to find out what caused it. But your doctor may do tests, if needed, to look for a specific bacteria, like methicillin-resistant Staphylococcus aureus (MRSA). The doctor has checked you carefully, but problems can develop later. If you notice any problems or new symptoms, get medical treatment right away. Follow-up care is a key part of your treatment and safety. Be sure to make and go to all appointments, and call your doctor if you are having problems. It's also a good idea to know your test results and keep a list of the medicines you take. How can you care for yourself at home? · Take your antibiotics as directed. Do not stop taking them just because you feel better. You need to take the full course of antibiotics. · Prop up the infected area on pillows to reduce pain and swelling. Try to keep the area above the level of your heart as often as you can. · If your doctor told you how to care for your wound, follow your doctor's instructions. If you did not get instructions, follow this general advice:  ? Wash the wound with clean water 2 times a day. Don't use hydrogen peroxide or alcohol, which can slow healing. ? You may cover the wound with a thin layer of petroleum jelly, such as Vaseline, and a nonstick bandage. ? Apply more petroleum jelly and replace the bandage as needed. · Be safe with medicines. Take pain medicines exactly as directed. ? If the doctor gave you a prescription medicine for pain, take it as prescribed. ? If you are not taking a prescription pain medicine, ask your doctor if you can take an over-the-counter medicine.   To prevent cellulitis in the future  · Try to prevent cuts, scrapes, or other injuries to your skin. Cellulitis most often occurs where there is a break in the skin. · If you get a scrape, cut, mild burn, or bite, wash the wound with clean water as soon as you can to help avoid infection. Don't use hydrogen peroxide or alcohol, which can slow healing. · If you have swelling in your legs (edema), support stockings and good skin care may help prevent leg sores and cellulitis. · Take care of your feet, especially if you have diabetes or other conditions that increase the risk of infection. Wear shoes and socks. Do not go barefoot. If you have athlete's foot or other skin problems on your feet, talk to your doctor about how to treat them. When should you call for help? Call your doctor now or seek immediate medical care if:    · You have signs that your infection is getting worse, such as:  ? Increased pain, swelling, warmth, or redness. ? Red streaks leading from the area. ? Pus draining from the area. ? A fever.     · You get a rash. Watch closely for changes in your health, and be sure to contact your doctor if:    · You do not get better as expected. Where can you learn more? Go to https://[employer]. org and sign in to your "[employer]" account. Enter I544 in the [facility] box to learn more about \"Cellulitis: Care Instructions. \"     If you do not have an account, please click on the \"Sign Up Now\" link. Current as of: March 3, 2021               Content Version: 12.9  © 2006-2021 [employer]. Care instructions adapted under license by [facility] ([facility]). If you have questions about a medical condition or this instruction, always ask your healthcare professional. [place] [address] any warranty or liability for your use of this information. Patient Education        cephalexin  Pronunciation:  jacy kumar GABE in  Brand:  Keflex  What is the most important information I should know about cephalexin?   You should not use this medicine if you are allergic to cephalexin or to similar antibiotics, such as Ceftin, Cefzil, Omnicef, and others. Tell your doctor if you are allergic to any drugs, especially penicillins or other antibiotics. What is cephalexin? Cephalexin is a cephalosporin (SEF a low spor in) antibiotic that is used to treat bacterial infections of the lungs, ear, skin, bones, bladder, and kidneys. Cephalexin is used to treat infections in adults and children who are at least 3year old. Cephalexin may also be used for purposes not listed in this medication guide. What should I discuss with my healthcare provider before taking cephalexin? You should not use this medicine if you are allergic to cephalexin or any other cephalosporin antibiotic (cefdinir, cefadroxil, cefoxitin, cefprozil, ceftriaxone, cefuroxime, Omnicef, and others). Tell your doctor if you have ever had:  · an allergy to any drug (especially penicillin);  · liver or kidney disease; or  · intestinal problems, such as colitis. The liquid form of cephalexin may contain sugar. This may affect you if you have diabetes. Tell your doctor if you are pregnant or breast-feeding. How should I take cephalexin? Follow all directions on your prescription label and read all medication guides or instruction sheets. Use the medicine exactly as directed. Do not use cephalexin to treat any condition that has not been checked by your doctor. Measure liquid medicine carefully. Use the dosing syringe provided, or use a medicine dose-measuring device (not a kitchen spoon). Use this medicine for the full prescribed length of time, even if your symptoms quickly improve. Skipping doses can increase your risk of infection that is resistant to medication. Cephalexin will not treat a viral infection such as the flu or a common cold. Do not share cephalexin with another person, even if they have the same symptoms you have.   This medicine can affect the results of certain medical tests. Tell any doctor who treats you that you are using cephalexin. Store the tablets and capsules at room temperature away from moisture, heat, and light. Store the liquid medicine in the refrigerator. Throw away any unused liquid after 14 days. What happens if I miss a dose? Take the medicine as soon as you can, but skip the missed dose if it is almost time for your next dose. Do not take two doses at one time. What happens if I overdose? Seek emergency medical attention or call the Poison Help line at 1-337.278.2984. Overdose symptoms may include nausea, vomiting, stomach pain, diarrhea, and blood in your urine. What should I avoid while taking cephalexin? Antibiotic medicines can cause diarrhea, which may be a sign of a new infection. If you have diarrhea that is watery or bloody, call your doctor before using anti-diarrhea medicine. What are the possible side effects of cephalexin? Get emergency medical help if you have signs of an allergic reaction (hives, difficult breathing, swelling in your face or throat) or a severe skin reaction (fever, sore throat, burning eyes, skin pain, red or purple skin rash with blistering and peeling). Call your doctor at once if you have:  · severe stomach pain, diarrhea that is watery or bloody (even if it occurs months after your last dose);  · unusual tiredness, feeling light-headed or short of breath;  · easy bruising, unusual bleeding, purple or red spots under your skin;  · a seizure;  · pale skin, cold hands and feet;  · yellowed skin, dark colored urine;  · fever, weakness; or  · pain in your side or lower back, painful urination. Common side effects may include:  · diarrhea;  · nausea, vomiting;  · indigestion, stomach pain; or  · vaginal itching or discharge. This is not a complete list of side effects and others may occur. Call your doctor for medical advice about side effects. You may report side effects to FDA at 0-924-MZP-4999.   What other drugs will affect cephalexin? Tell your doctor about all your other medicines, especially:  · metformin; or  · probenecid. This list is not complete. Other drugs may affect cephalexin, including prescription and over-the-counter medicines, vitamins, and herbal products. Not all possible drug interactions are listed here. Where can I get more information? Your pharmacist can provide more information about cephalexin. Remember, keep this and all other medicines out of the reach of children, never share your medicines with others, and use this medication only for the indication prescribed. Every effort has been made to ensure that the information provided by [facility] [address] is accurate, up-to-date, and complete, but no guarantee is made to that effect. Drug information contained herein may be time sensitive. [facility] information has been compiled for use by healthcare practitioners and consumers in the [place] and therefore [employer] does not warrant that uses outside of the [place] are appropriate, unless specifically indicated otherwise. [facility]'s drug information does not endorse drugs, diagnose patients or recommend therapy. [facility]'s drug information is an informational resource designed to assist licensed healthcare practitioners in caring for their patients and/or to serve consumers viewing this service as a supplement to, and not a substitute for, the expertise, skill, knowledge and judgment of healthcare practitioners. The absence of a warning for a given drug or drug combination in no way should be construed to indicate that the drug or drug combination is safe, effective or appropriate for any given patient. [employer] does not assume any responsibility for any aspect of healthcare administered with the aid of information [facility] provides.  The information contained herein is not intended to cover all possible uses, directions, precautions, warnings, drug interactions, allergic reactions, or adverse effects. If you have questions about the drugs you are taking, check with your doctor, nurse or pharmacist.  Copyright 5380-1447 [address]: 10.03. Revision date: 1/4/2021. Care instructions adapted under license by [facility] ([facility]). If you have questions about a medical condition or this instruction, always ask your healthcare professional. [place] [address] any warranty or liability for your use of this information.

## 2021-07-30 ENCOUNTER — OFFICE VISIT (OUTPATIENT)
Dept: FAMILY MEDICINE CLINIC | Age: 61
End: 2021-07-30
Payer: COMMERCIAL

## 2021-07-30 ENCOUNTER — TELEMEDICINE (OUTPATIENT)
Dept: PAIN MANAGEMENT | Age: 61
End: 2021-07-30
Payer: COMMERCIAL

## 2021-07-30 VITALS
SYSTOLIC BLOOD PRESSURE: 120 MMHG | HEART RATE: 74 BPM | TEMPERATURE: 97.9 F | DIASTOLIC BLOOD PRESSURE: 72 MMHG | OXYGEN SATURATION: 96 %

## 2021-07-30 DIAGNOSIS — L03.116 CELLULITIS OF LEFT ANTERIOR LOWER LEG: Primary | ICD-10-CM

## 2021-07-30 DIAGNOSIS — M79.89 SWELLING OF LOWER LEG: ICD-10-CM

## 2021-07-30 DIAGNOSIS — Q27.8 VASCULAR MALFORMATION OF LOWER EXTREMITY: ICD-10-CM

## 2021-07-30 DIAGNOSIS — M54.12 CERVICAL RADICULOPATHY: Primary | ICD-10-CM

## 2021-07-30 PROCEDURE — 99212 OFFICE O/P EST SF 10 MIN: CPT | Performed by: PHYSICAL MEDICINE & REHABILITATION

## 2021-07-30 PROCEDURE — 99213 OFFICE O/P EST LOW 20 MIN: CPT | Performed by: NURSE PRACTITIONER

## 2021-07-30 ASSESSMENT — ENCOUNTER SYMPTOMS
VOMITING: 0
COUGH: 0
DIARRHEA: 0
SHORTNESS OF BREATH: 0
NAUSEA: 0

## 2021-07-30 NOTE — PROGRESS NOTES
[facility] Notes    Name: Elliot Lucas  : 1960         Chief Complaint:     Chief Complaint   Patient presents with    Cellulitis     Patient here today for cellulitis left leg. She was at walk in clinic yesterday and started on cephalexin. History of Present Illness:        HPI  Patient is a 51-year-old female who reports for follow-up. Patient was seen at the walk-in yesterday for cellulitis of the left lower leg and foot. Patient was started on Keflex. Patient has an allergy to Bactrim. Patient states that her foot feels a little bit better, however she is concerned about the redness and swelling. Past Medical History:     Past Medical History:   Diagnosis Date    Acid reflux     Bleeding tendency ([facility])     Blood type O+     Bronchitis     Chronic back pain     Depression     Diverticulitis     DVT (deep venous thrombosis) ([facility])     Left leg    Fibromyalgia     Hypertension     Internal hemorrhoid     Left thyroid nodule 2013    Nausea & vomiting     Pulmonary embolism ([facility])       Reviewed all health maintenance requirements and ordered appropriate tests  Health Maintenance Due   Topic Date Due    COVID-19 Vaccine (1) Never done    DTaP/Tdap/Td vaccine (1 - Tdap) Never done    Shingles Vaccine (1 of 2) Never done    Cervical cancer screen  09/15/2017    Breast cancer screen  05/10/2018       Past Surgical History:     Past Surgical History:   Procedure Laterality Date    BACK INJECTION Bilateral 2021    SACROILIAC JOINT INJECTION BILATERAL performed by Zena Malcolm MD at [address] [place] [address]  2013    laparoscopic    COLONOSCOPY      / Dr. Bam Cordoba Left         Medications:       Prior to Admission medications    Medication Sig Start Date End Date Taking?  Authorizing Provider   cephALEXin (KEFLEX) 500 MG capsule Take 1 capsule by mouth 4 times daily for 7 days 21 Yes ASHLEY Celeste Ma, CNP   rOPINIRole (REQUIP) 0.5 MG tablet TAKE ONE TABLET BY MOUTH THREE TIMES A DAY 7/13/21  Yes Shelbie Millan MD   celecoxib (CELEBREX) 100 MG capsule Take 1 capsule by mouth 2 times daily 7/13/21  Yes Shelbie Millan MD   furosemide (LASIX) 20 MG tablet TAKE ONE TABLET BY MOUTH DAILY 7/12/21  Yes Tyler Frazier MD   famotidine (PEPCID) 20 MG tablet Take 1 tablet by mouth 2 times daily 6/7/21  Yes Juliana Anaya DO   dilTIAZem (CARDIZEM CD) 120 MG extended release capsule Take 1 capsule by mouth daily 6/7/21  Yes Adriana Monzon DO   Cholecalciferol (VITAMIN D) 50 MCG (2000 UT) CAPS capsule Take 2,000 Units by mouth daily   Yes Historical Provider, MD   flecainide (TAMBOCOR) 50 MG tablet Take 50 mg by mouth 2 times daily WILL START IN THREE WEEKS, (AFTER BEING OFF AMIODARONE X 3 WEEKS)   Yes Historical Provider, MD   amitriptyline (ELAVIL) 100 MG tablet Take 1 tablet by mouth nightly 2/24/21  Yes ASHLEY Tracy CNP   spironolactone (ALDACTONE) 25 MG tablet Take 25 mg by mouth 2 times daily   Yes Historical Provider, MD   apixaban (ELIQUIS) 5 MG TABS tablet Take 1 tablet by mouth 2 times daily 1/28/21  Yes ASHLEY Tracy CNP   metoprolol succinate (TOPROL XL) 50 MG extended release tablet Take 1 tablet by mouth daily 7/27/20  Yes Tyler Frazier MD   Elastic Bandages & Supports ([address] [facility] [facility] [address]) [address] 1 each by Does not apply route daily Knee high 20mmHg  Patient not taking: Reported on 7/29/2021 1/3/19   ASHLEY Tracy CNP        Allergies:       Ampicillin and Sulfa antibiotics    Social History:     Tobacco:    reports that she has never smoked. She has never used smokeless tobacco.  Alcohol:      reports no history of alcohol use. Drug Use:  reports no history of drug use.     Family History:        Family History   Problem Relation Age of Onset    Stroke Father     Diabetes Sister     Thyroid Disease Brother     Atrial Fibrillation Brother     Atrial Fibrillation Mother        Review of Systems:         Review of Systems   Constitutional: Negative for chills and fever. Respiratory: Negative for cough and shortness of breath. Cardiovascular: Negative for chest pain and palpitations. Gastrointestinal: Negative for diarrhea, nausea and vomiting. Skin: Positive for wound (left foot). Neurological: Negative for dizziness, seizures and headaches. Physical Exam:     Vitals:  /72 (Site: Left Lower Arm)   Pulse 74   Temp 97.9 °F (36.6 °C) (Oral)   SpO2 96%       Physical Exam  Vitals and nursing note reviewed. Constitutional:       Appearance: Normal appearance. She is well-developed. Cardiovascular:      Rate and Rhythm: Normal rate and regular rhythm. Pulses:           Dorsalis pedis pulses are 1+ on the left side. Heart sounds: Normal heart sounds, S1 normal and S2 normal.   Pulmonary:      Effort: Pulmonary effort is normal. No respiratory distress. Breath sounds: Normal breath sounds. Abdominal:      General: Bowel sounds are normal.      Palpations: Abdomen is soft. Tenderness: There is no abdominal tenderness. Musculoskeletal:        Feet:    Feet:      Comments: Erythema and swelling to the dorsal left foot. No warmth. Range of motion within normal limits  Skin:     General: Skin is warm and dry. Neurological:      Mental Status: She is alert and oriented to person, place, and time.                    Data:     Lab Results   Component Value Date     05/27/2021    K 4.5 05/27/2021     05/27/2021    CO2 26 05/27/2021    BUN 25 05/27/2021    CREATININE 1.32 05/27/2021    GLUCOSE 119 05/27/2021    PROT 6.4 05/27/2021    LABALBU 3.6 05/27/2021    BILITOT 0.43 05/27/2021    ALKPHOS 126 05/27/2021    AST 16 05/27/2021    ALT 17 05/27/2021     Lab Results   Component Value Date    WBC 4.9 05/27/2021    RBC 4.07 05/27/2021    RBC 4.17 05/15/2012    HGB 12.5 05/27/2021 HCT 36.6 05/27/2021    MCV 89.9 05/27/2021    MCH 30.8 05/27/2021    MCHC 34.2 05/27/2021    RDW 15.1 05/27/2021     05/27/2021     05/15/2012    MPV NOT REPORTED 05/27/2021     Lab Results   Component Value Date    TSH 2.47 05/27/2021     Lab Results   Component Value Date    CHOL 172 05/27/2021    CHOL 207 05/23/2019    HDL 43 05/27/2021    LABA1C 6.1 10/27/2020          Assessment & Plan        Diagnosis Orders   1. Cellulitis of left anterior lower leg  VL GEORGE BILATERAL LIMITED 1-2 LEVELS   2. Vascular malformation of lower extremity  VL GEORGE BILATERAL LIMITED 1-2 LEVELS   3. Swelling of lower leg  VL GEORGE BILATERAL LIMITED 1-2 LEVELS     Continue Keflex until gone. Will start to evaluate circulation in lower extremities as patient does have a lot of chronic swelling and chronic infections of her lower legs and feet. Will start with ankle-brachial index. Patient verbalizes understanding and agreement with plan. All questions answered. If symptoms do not resolve or worsen, return to office. Completed Refills   Requested Prescriptions      No prescriptions requested or ordered in this encounter     No follow-ups on file. No orders of the defined types were placed in this encounter. Orders Placed This Encounter   Procedures    VL GEORGE BILATERAL LIMITED 1-2 LEVELS     Standing Status:   Future     Standing Expiration Date:   7/30/2022     Order Specific Question:   Reason for exam:     Answer:   recurrent infections and swelling of bilat legs         Patient Instructions   SURVEY:    You may be receiving a survey from [employer] regarding your visit today. Please complete the survey to enable us to provide the highest quality of care to you and your family. If you cannot score us a very good (5 Stars) on any question, please call the office to discuss how we could have made your experience a very good one. Thank you.     Clinical Care Team: ASHLEY Tracy-MARIAN
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
## 2021-07-30 NOTE — PATIENT INSTRUCTIONS
SURVEY:    You may be receiving a survey from [employer] regarding your visit today. Please complete the survey to enable us to provide the highest quality of care to you and your family. If you cannot score us a very good (5 Stars) on any question, please call the office to discuss how we could have made your experience a very good one. Thank you.     Clinical Care Team: ASHLEY Garza-MARIAN Green LPN    Clerical Team: Myriam Dick

## 2021-08-09 ENCOUNTER — HOSPITAL ENCOUNTER (OUTPATIENT)
Dept: VASCULAR LAB | Age: 61
Discharge: HOME | End: 2021-08-11
Payer: COMMERCIAL

## 2021-08-09 DIAGNOSIS — M79.89 SWELLING OF LOWER LEG: ICD-10-CM

## 2021-08-09 DIAGNOSIS — L03.116 CELLULITIS OF LEFT ANTERIOR LOWER LEG: ICD-10-CM

## 2021-08-09 DIAGNOSIS — Q27.8 VASCULAR MALFORMATION OF LOWER EXTREMITY: ICD-10-CM

## 2021-08-09 PROCEDURE — 93922 UPR/L XTREMITY ART 2 LEVELS: CPT

## 2021-08-12 ENCOUNTER — HOSPITAL ENCOUNTER (OUTPATIENT)
Dept: PHYSICAL THERAPY | Age: 61
Setting detail: THERAPIES SERIES
Discharge: HOME | End: 2021-08-12
Payer: COMMERCIAL

## 2021-08-12 PROCEDURE — 97162 PT EVAL MOD COMPLEX 30 MIN: CPT

## 2021-08-12 ASSESSMENT — PAIN SCALES - GENERAL: PAINLEVEL_OUTOF10: 4

## 2021-08-12 NOTE — PROGRESS NOTES
Phone: [phone]         Fax: [phone]                      Outpatient Physical Therapy                                                                      Evaluation    Date: 2021  Patient: Amara Zara  : [birth year]  ACCT #: [de-identified]    Referring Practitioner: Dr. Rolan Mcgrath    Referral Date : 21    Diagnosis: Cervical radiculopathy    Treatment Diagnosis: Cervical pain  Onset Date: 21  PT Insurance Information: MM  Total # of Visits Approved: 40 Per Physician Order  Total # of Visits to Date: 1  No Show: 0  Canceled Appointment: 0     Subjective     Additional Pertinent Hx: 1-2 years of intermittent cervical pain with recent exacerbation recently. Does occasional massage therapy which assists in decreasing tightness. 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 Also notes a mild increase in headaches as well as intermittent lef tUE radicular pain extending to elbow  Recent x-rays reveal moderate DDD C6-7. Patient awaiting approval for MRI. PMHx includes bilateral TKR, heart condition, arthritis including bilateral shoulders with limited AROM.    Oswestry  =   Pain Screening  Patient Currently in Pain: Yes  Pain Assessment  Pain Assessment: 0-10  Pain Level: 4 (ranges 1-7/10 unless left shoulder pain then 8-9/10.)     IADL History  Active : Yes  Occupation: Part time employment  Type of occupation: [employer]  Leisure & Hobbies: Enjoys making cards/crafts; active with daily household tasks    Objective  Vision  Vision: Within Functional Limits  Hearing  Hearing: Within functional limits  Observation/Palpation  Posture: Fair  Palpation: Moderate tightnes along left UT/cervical paraspinals and left SCM; to lesser extent subocc ridge  Spine  Cervical: Bilateral rotations WFL with end range discomfort; flex /ext WFL with minimal discomfort  Thoracic: Mild thoracic kyphosis  Strength RUE  Strength RUE: WFL  Comment: Distal extremity strength WFL but limited shoulder ROM and strength associated with arthritis  Strength LUE  Strength LUE: WFL  Comment: Distal extremity strength WFL but limited shoulder ROM and strength associated with arthritis    AROM RUE (degrees)  RUE General AROM: Limited shoulder flex to < 90 deg  AROM LUE (degrees)  LUE General AROM: Limited shoulder flexion to < 90 deg                                           Assessment  Assessment: Patient presents with cervical pain and tightness which limits tolerance to complete daily household and functional tasks as well as sleeping tolerance. She notes intermittent headaches, lightheadedness and occasional left UE radicular pain extending to elbow. She would benefit from PT to educate on home cervical and postural exercises.   Prognosis: Good  Decision Making: Medium Complexity  Exam: Oswestry = 12/50    Clinical Presentation:  Evolving  The Following Comorbities will impact the patients progression and Plan of Care:   Obesity ,  Previous orthopedic issues/surgery     Activity Tolerance: Patient Tolerated treatment well    Education: PT POC;  Educated and issued written hand out on chin tucks and doorway stretch at lower position          Goals  Short term goals  Time Frame for Short term goals: 6 visits  Short term goal 1: Educate on home program of cervical and UE stretches and postural strengthening    Long term goals  Time Frame for Long term goals : 12 visits  Long term goal 1: Decrease subjective cervical pain with daily activities to <3/10  Long term goal 2: Improve sleep tolerance to 3 hours before requiring a postural change  Long term goal 3: Decrease subjective headaches and/or lightheadedness by > 75% in frequency/severity    Patient's Goal:    Decrease neck tightness and discomfort; be able to sleep better    Timed Code Treatment Minutes: 0 Minutes  Total Treatment Time: 50     Time In: 9686  Time Out: 5087    JENNY RAMOS, PT Date: 8/12/2021

## 2021-08-12 NOTE — PLAN OF CARE
[facility] MARKIE FLEMING       Phone: [phone]   Date: 2021                      Outpatient Physical Therapy  Fax: [phone]    ACCT #: [de-identified]                     Plan of Care  [facility]#: [id number]  Patient: Nicole Gomez  : 1960    Referring Practitioner: Dr. Keena Velasco    Referral Date : 21    Diagnosis: Cervical radiculopathy  Onset Date: 21  Treatment Diagnosis: Cervical pain    Assessment: Patient presents with cervical pain and tightness which limits tolerance to complete daily household and functional tasks as well as sleeping tolerance. She notes intermittent headaches, lightheadedness and occasional left UE radicular pain extending to elbow. She would benefit from PT to educate on home cervical and postural exercises. Prognosis: Good    Treatment Plan :  Days: 2 times per week Weeks: 6 weeks Total # of Visits Approved: 40    Patient Education/HEP, Therapeutic Exercise and Manual Therapy,  Cervical traction     Goals:    Time Frame for Short term goals: 6 visits  Short term goal 1: Educate on home program of cervical and UE stretches and postural strengthening    Time Frame for Long term goals : 12 visits  Long term goal 1: Decrease subjective cervical pain with daily activities to <3/10  Long term goal 2: Improve sleep tolerance to 3 hours before requiring a postural change  Long term goal 3: Decrease subjective headaches and/or lightheadedness by > 75% in frequency/severity     NUNU JENKINS PT   Date: 2021    ______________________________________ Date: 2021  Physician Signature  By signing above or cosigning electronically, I have reviewed this Plan of Care and certify a need for medically necessary rehabilitation services.

## 2021-08-16 RX ORDER — METOPROLOL SUCCINATE 50 MG/1
50 TABLET, EXTENDED RELEASE ORAL DAILY
Qty: 90 TABLET | Refills: 3 | Status: SHIPPED | OUTPATIENT
Start: 2021-08-16 | End: 2022-08-22 | Stop reason: SDUPTHER

## 2021-08-16 RX ORDER — DILTIAZEM HYDROCHLORIDE 120 MG/1
CAPSULE, EXTENDED RELEASE ORAL
Qty: 90 CAPSULE | Refills: 1 | Status: SHIPPED | OUTPATIENT
Start: 2021-08-16 | End: 2022-02-21 | Stop reason: SDUPTHER

## 2021-08-17 ENCOUNTER — HOSPITAL ENCOUNTER (OUTPATIENT)
Dept: PHYSICAL THERAPY | Age: 61
Setting detail: THERAPIES SERIES
Discharge: HOME | End: 2021-08-17
Payer: COMMERCIAL

## 2021-08-17 PROCEDURE — 97140 MANUAL THERAPY 1/> REGIONS: CPT

## 2021-08-17 ASSESSMENT — PAIN SCALES - GENERAL: PAINLEVEL_OUTOF10: 7

## 2021-08-17 NOTE — PROGRESS NOTES
Phone: [phone]      Fax: [phone]                            Outpatient Physical Therapy                                                                            Daily Note    Date: 2021  Patient Name: Concepción Stokes        MRN: [id number]   ACCT#:  [de-identified]  : 1960  (64 y.o.)    Referring Practitioner: Dr. Alberto Del Rosario    Referral Date : 21    Diagnosis: Cervical radiculopathy  Treatment Diagnosis: Cervical pain    Onset Date: 21  PT Insurance Information: MM  Total # of Visits Approved: 40 Per Physician Order  Total # of Visits to Date: 2  No Show: 0  Canceled Appointment: 0  Plan of Care/Certification Expiration Date: 10/07/21    Pre-Treatment Pain:  7/10     Assessment  Assessment: Pt presents with pain 7/10. She reports her fibromyalgia is bad today in addition to her cervical pain. Initiated cupping to decreased cervical and thoracic muscle tension . Good overall dandre with pain reduced to 5/10. Instructed in heat , ice and hydration.   Chart Reviewed: Yes    Plan  Plan: Continue with current plan    Exercises/Modalities/Manual:  See DocFlow Sheet              Goals  (Total # of Visits to Date: 2)   Short Term Goals - Time Frame for Short term goals: 6 visits  Short term goal 1: Educate on home program of cervical and UE stretches and postural strengthening                Long Term Goals - Time Frame for Long term goals : 12 visits  Long term goal 1: Decrease subjective cervical pain with daily activities to <3/10  Long term goal 2: Improve sleep tolerance to 3 hours before requiring a postural change  Long term goal 3: Decrease subjective headaches and/or lightheadedness by > 75% in frequency/severity          Post Treatment Pain:  5/10    Time In: 1030    Time Out : 1105        Timed Code Treatment Minutes: 35 Minutes  Total Treatment Time: 35 Minutes    Nohemi Hannah   PTA  Date: 2021

## 2021-08-20 ENCOUNTER — HOSPITAL ENCOUNTER (OUTPATIENT)
Dept: PHYSICAL THERAPY | Age: 61
Setting detail: THERAPIES SERIES
Discharge: HOME | End: 2021-08-20
Payer: COMMERCIAL

## 2021-08-20 PROCEDURE — 97110 THERAPEUTIC EXERCISES: CPT

## 2021-08-20 PROCEDURE — 97140 MANUAL THERAPY 1/> REGIONS: CPT

## 2021-08-20 ASSESSMENT — PAIN SCALES - GENERAL: PAINLEVEL_OUTOF10: 2

## 2021-08-24 ENCOUNTER — HOSPITAL ENCOUNTER (OUTPATIENT)
Dept: PHYSICAL THERAPY | Age: 61
Setting detail: THERAPIES SERIES
Discharge: HOME | End: 2021-08-24
Payer: COMMERCIAL

## 2021-08-24 PROCEDURE — 97110 THERAPEUTIC EXERCISES: CPT

## 2021-08-24 PROCEDURE — 97140 MANUAL THERAPY 1/> REGIONS: CPT

## 2021-08-24 ASSESSMENT — PAIN SCALES - GENERAL: PAINLEVEL_OUTOF10: 4

## 2021-08-27 ENCOUNTER — HOSPITAL ENCOUNTER (OUTPATIENT)
Dept: PHYSICAL THERAPY | Age: 61
Setting detail: THERAPIES SERIES
Discharge: HOME | End: 2021-08-27
Payer: COMMERCIAL

## 2021-08-27 PROCEDURE — 97140 MANUAL THERAPY 1/> REGIONS: CPT

## 2021-08-27 PROCEDURE — 97110 THERAPEUTIC EXERCISES: CPT

## 2021-08-27 ASSESSMENT — PAIN SCALES - GENERAL: PAINLEVEL_OUTOF10: 5

## 2021-08-27 NOTE — PROGRESS NOTES
headaches and/or lightheadedness by > 75% in frequency/severity          Post Treatment Pain:  5/10    Time In: 1352  Time Out: 1435  Timed Code Treatment Minutes: 43 Minutes  Total Treatment Time: Fran Doherty PT     Date: 8/27/2021

## 2021-08-30 RX ORDER — AMITRIPTYLINE HYDROCHLORIDE 100 MG/1
100 TABLET, FILM COATED ORAL NIGHTLY
Qty: 90 TABLET | Refills: 1 | Status: SHIPPED | OUTPATIENT
Start: 2021-08-30 | End: 2022-03-08

## 2021-08-30 RX ORDER — FAMOTIDINE 20 MG/1
20 TABLET, FILM COATED ORAL 2 TIMES DAILY
Qty: 60 TABLET | Refills: 2 | Status: SHIPPED | OUTPATIENT
Start: 2021-08-30 | End: 2021-12-06 | Stop reason: SDUPTHER

## 2021-08-31 ENCOUNTER — HOSPITAL ENCOUNTER (OUTPATIENT)
Dept: PHYSICAL THERAPY | Age: 61
Setting detail: THERAPIES SERIES
Discharge: HOME | End: 2021-08-31
Payer: COMMERCIAL

## 2021-08-31 PROCEDURE — 97140 MANUAL THERAPY 1/> REGIONS: CPT

## 2021-08-31 NOTE — PROGRESS NOTES
Phone: [address]      Fax: [phone]                            Outpatient Physical Therapy                                                                            Daily Note    Date: 2021  Patient Name: Masha Carmichael        MRN: [id number]   ACCT#:  [de-identified]  : 1960  (64 y.o.)    Referring Practitioner: Dr. Astrid Castillo    Referral Date : 21    Diagnosis: Cervical radiculopathy  Treatment Diagnosis: Cervical pain    Onset Date: 21  PT Insurance Information: MM  Total # of Visits Approved: 40 Per Physician Order  Total # of Visits to Date: 6  No Show: 0  Canceled Appointment: 0  Plan of Care/Certification Expiration Date: 10/07/21    Pre-Treatment Pain:  5-6/10     Assessment  Assessment: Patient with good tolerance to [place] however states she \"tightens right back up\". Notes increased tightness this date and pain last night. Continued with Graston to bilateral UT and cervical/thoracic paraspinals. May consider kinesiotaping for support and circulatory benefits.   Patient also has TENS unit which she will attempt to find and bring next treatment  Chart Reviewed: Yes    Plan  Plan: Continue with current plan    Exercises/Modalities/Manual:  See DocFlow Sheet    Education:           Goals  (Total # of Visits to Date: 6)   Short Term Goals - Time Frame for Short term goals: 6 visits  Short term goal 1: Educate on home program of cervical and UE stretches and postural strengthening- MET                Long Term Goals - Time Frame for Long term goals : 12 visits  Long term goal 1: Decrease subjective cervical pain with daily activities to <3/10  Long term goal 2: Improve sleep tolerance to 3 hours before requiring a postural change  Long term goal 3: Decrease subjective headaches and/or lightheadedness by > 75% in frequency/severity          Post Treatment Pain:  5/10    Time In: 1330  (arrived 14-15 min late)  Time Out : 1400        Timed Code Treatment Minutes: 25 Minutes  Total Treatment Time: 27 Minutes    NUNU JENKINS, PT     Date: 8/31/2021

## 2021-09-02 ENCOUNTER — HOSPITAL ENCOUNTER (OUTPATIENT)
Dept: PHYSICAL THERAPY | Age: 61
Setting detail: THERAPIES SERIES
Discharge: HOME | End: 2021-09-02
Payer: COMMERCIAL

## 2021-09-02 PROCEDURE — 97110 THERAPEUTIC EXERCISES: CPT

## 2021-09-02 ASSESSMENT — PAIN SCALES - GENERAL: PAINLEVEL_OUTOF10: 3

## 2021-09-02 NOTE — PROGRESS NOTES
Phone: Yoan Kulkarni      Fax: [phone]                            Outpatient Physical Therapy                                                                            Daily Note    Date: 2021  Patient Name: Ne Veloz        MRN: [id number]   ACCT#:  [de-identified]  : 1960  (64 y.o.)    Referring Practitioner: Dr. Paul Motta    Referral Date : 21    Diagnosis: Cervical radiculopathy  Treatment Diagnosis: Cervical pain    Onset Date: 21  PT Insurance Information: MM  Total # of Visits Approved: 40 Per Physician Order  Total # of Visits to Date: 7  No Show: 0  Canceled Appointment: 0  Plan of Care/Certification Expiration Date: 10/07/21    Pre-Treatment Pain:  3/10     Assessment  Assessment: Patient states she saw massage therapist yesterday with good pain reduction. Rates pain 3/10 this date. Attemtps shld exercises with tband with limited tolerance due to shoulder pain. Consider isometrics. Applied kinesiotape this date for circulatory and relaxation along cerivcal/upper thor paraspinals.   Educated patient on skin monitoring and wearing/removal.  Will monitor benefits  Chart Reviewed: Yes    Plan  Plan: Continue with current plan    Exercises/Modalities/Manual:  See DocFlow Sheet    Education: Kinesiotape          Goals  (Total # of Visits to Date: 7)   Short Term Goals - Time Frame for Short term goals: 6 visits  Short term goal 1: Educate on home program of cervical and UE stretches and postural strengthening                Long Term Goals - Time Frame for Long term goals : 12 visits  Long term goal 1: Decrease subjective cervical pain with daily activities to <3/10  Long term goal 2: Improve sleep tolerance to 3 hours before requiring a postural change  Long term goal 3: Decrease subjective headaches and/or lightheadedness by > 75% in frequency/severity          Post Treatment Pain:  310    Time In: 1450    Time Out : 1510        Timed Code Treatment Minutes: 15 Minutes  Total Treatment Time: 20 Minutes    NUNU JENKINS, PT     Date: 9/2/2021

## 2021-09-07 ENCOUNTER — HOSPITAL ENCOUNTER (OUTPATIENT)
Dept: PHYSICAL THERAPY | Age: 61
Setting detail: THERAPIES SERIES
Discharge: HOME | End: 2021-09-07
Payer: COMMERCIAL

## 2021-09-07 PROCEDURE — 97140 MANUAL THERAPY 1/> REGIONS: CPT

## 2021-09-07 ASSESSMENT — PAIN SCALES - GENERAL: PAINLEVEL_OUTOF10: 4

## 2021-09-07 NOTE — PROGRESS NOTES
Phone: Yoan Kulkarni      Fax: [phone]                            Outpatient Physical Therapy                                                                            Daily Note    Date: 2021  Patient Name: Bipin Nieves        MRN: [id number]   ACCT#:  [de-identified]  : 1960  (64 y.o.)    Referring Practitioner: Dr. Stephie Poole    Referral Date : 21    Diagnosis: Cervical radiculopathy  Treatment Diagnosis: Cervical pain    Onset Date: 21  PT Insurance Information: MM  Total # of Visits Approved: 40 Per Physician Order  Total # of Visits to Date: 8  No Show: 0  Canceled Appointment: 0  Plan of Care/Certification Expiration Date: 10/07/21    Pre-Treatment Pain:  4/10     Assessment  Assessment: Patient reports good tightness reduction and tolerance to kinesiotape along paraspinals although notes increased suboccital pain and headache this date. Signficant tightness along subocc left > right which reduced with soft tissue massage and PA mobs in sitting. Kinesiotape re-applied along paraspinals as well as B UT.   Plan to continue x 1 vist  Chart Reviewed: Yes    Plan  Plan: Continue with current plan    Exercises/Modalities/Manual:  See DocFlow Sheet    Education:           Goals  (Total # of Visits to Date: 8)   Short Term Goals - Time Frame for Short term goals: 6 visits  Short term goal 1: Educate on home program of cervical and UE stretches and postural strengthening                Long Term Goals - Time Frame for Long term goals : 12 visits  Long term goal 1: Decrease subjective cervical pain with daily activities to <3/10  Long term goal 2: Improve sleep tolerance to 3 hours before requiring a postural change  Long term goal 3: Decrease subjective headaches and/or lightheadedness by > 75% in frequency/severity          Post Treatment Pain:  3/10    Time In: 1545    Time Out : 1620        Timed Code Treatment Minutes: 35 Minutes  Total Treatment Time: [facility] , PT     Date: 9/7/2021

## 2021-09-09 ENCOUNTER — HOSPITAL ENCOUNTER (OUTPATIENT)
Dept: PHYSICAL THERAPY | Age: 61
Setting detail: THERAPIES SERIES
Discharge: HOME | End: 2021-09-09
Payer: COMMERCIAL

## 2021-09-09 PROCEDURE — 97140 MANUAL THERAPY 1/> REGIONS: CPT

## 2021-09-09 NOTE — PROGRESS NOTES
Phone: Yoan Kulkarni      Fax: [phone]                            Outpatient Physical Therapy                                                                            Daily Note    Date: 2021  Patient Name: Daniela Hong        MRN: [id number]   ACCT#:  [de-identified]  : 1960  (64 y.o.)    Referring Practitioner: Dr. Shy Cota    Referral Date : 21    Diagnosis: Cervical radiculopathy  Treatment Diagnosis: Cervical pain    Onset Date: 21  PT Insurance Information: MM  Total # of Visits Approved: 40 Per Physician Order  Total # of Visits to Date: 9  No Show: 0  Canceled Appointment: 0  Plan of Care/Certification Expiration Date: 10/07/21    Pre-Treatment Pain:  2/10     Assessment  Assessment: Patient has completed 9 treatment sessions consisting of exercise/education for home program for  scapular and postural strengthening along with manual therapy to the cervical/upper back. She presented with chronic cervical pain with recent exacerbation. Overall, patient reports a 50% reduction in her pain. She is attempted her home exercises but is limited due to signficant bilateral shoulder arthritis/pain. Based on her current status, plan to hold/discharge further PT and allow patient to continue on an independent basis.      Chart Reviewed: Yes    Plan  Plan: Discharge    Exercises/Modalities/Manual:  See DocFlow Sheet    Education: Isometrics for shoulder/postural strengthening;  kinesiotape to upper thor/cervical paraspinals          Goals  (Total # of Visits to Date: 5)   Short Term Goals - Time Frame for Short term goals: 6 visits  Short term goal 1: Educate on home program of cervical and UE stretches and postural strengthening- MET                Long Term Goals - Time Frame for Long term goals : 12 visits  Long term goal 1: Decrease subjective cervical pain with daily activities to <3/10- MET  Long term goal 2: Improve sleep tolerance to 3 hours before requiring a postural change- NOT MET  Long term goal 3: Decrease subjective headaches and/or lightheadedness by > 75% in frequency/severity- MET          Post Treatment Pain:  3/10    Time In: 1450    Time Out : 1530        Timed Code Treatment Minutes: 35 Minutes  Total Treatment Time: [address], PT     Date: 9/9/2021

## 2021-09-10 ENCOUNTER — TELEMEDICINE (OUTPATIENT)
Dept: PAIN MANAGEMENT | Age: 61
End: 2021-09-10
Payer: COMMERCIAL

## 2021-09-10 DIAGNOSIS — M54.12 CERVICAL RADICULOPATHY: ICD-10-CM

## 2021-09-10 DIAGNOSIS — M79.7 FIBROMYALGIA: Primary | ICD-10-CM

## 2021-09-10 PROCEDURE — 99213 OFFICE O/P EST LOW 20 MIN: CPT | Performed by: PHYSICAL MEDICINE & REHABILITATION

## 2021-09-10 RX ORDER — PREGABALIN 75 MG/1
75 CAPSULE ORAL 2 TIMES DAILY
Qty: 60 CAPSULE | Refills: 2 | Status: SHIPPED | OUTPATIENT
Start: 2021-09-10 | End: 2021-12-10 | Stop reason: SDUPTHER

## 2021-09-10 NOTE — PROGRESS NOTES
Days of Exercise per Week:     Minutes of Exercise per Session:    Stress:     Feeling of Stress :    Social Connections:     Frequency of Communication with Friends and Family:     Frequency of Social Gatherings with Friends and Family:     Attends Confucianist Services:     Active Member of Clubs or Organizations:     Attends Club or Organization Meetings:     Marital Status:    Intimate Partner Violence:     Fear of Current or Ex-Partner:     Emotionally Abused:     Physically Abused:     Sexually Abused:        Past Medical History:   Diagnosis Date    Acid reflux     [facility]     Blood type O+     Bronchitis     Chronic back pain     Depression     Diverticulitis     DVT (deep venous thrombosis) ([facility] 2012    Left leg    Fibromyalgia     Hypertension     Internal hemorrhoid     Left thyroid nodule 7/25/2013    Nausea & vomiting     Pulmonary embolism ([facility]        Past Surgical History:   Procedure Laterality Date    BACK INJECTION Bilateral 6/4/2021    SACROILIAC JOINT INJECTION BILATERAL performed by Tanner Carrero MD at [facility]  08/22/2013    laparoscopic    COLONOSCOPY      2012/ Dr. Drew Villalobos Left        Family History   Problem Relation Age of Onset    Stroke Father     Diabetes Sister     Thyroid Disease Brother     Atrial Fibrillation Brother     Atrial Fibrillation Mother         Prior to Visit Medications    Medication Sig Taking? Authorizing Provider   pregabalin (LYRICA) 75 MG capsule Take 1 capsule by mouth 2 times daily for 30 days.  Yes Tanner Carrero MD   amitriptyline (ELAVIL) 100 MG tablet Take 1 tablet by mouth nightly  Taco Blind, APRN - CNP   famotidine (PEPCID) 20 MG tablet Take 1 tablet by mouth 2 times daily  Taco Blind, APRN - CNP   metoprolol succinate (TOPROL XL) 50 MG extended release tablet Take 1 tablet by mouth daily  Yesenia Salinas Brendon Hunter MD   CARTIA  MG extended release capsule TAKE ONE CAPSULE BY MOUTH DAILY  Bret Carroll DO   rOPINIRole (REQUIP) 0.5 MG tablet TAKE ONE TABLET BY MOUTH THREE TIMES A DAY  Douglas Chacko MD   celecoxib (CELEBREX) 100 MG capsule Take 1 capsule by mouth 2 times daily  Douglas Chacko MD   furosemide (LASIX) 20 MG tablet TAKE ONE TABLET BY MOUTH DAILY  Lulu Hope MD   Cholecalciferol (VITAMIN D) 50 MCG (2000 UT) CAPS capsule Take 2,000 Units by mouth daily  Historical Provider, MD   flecainide (TAMBOCOR) 50 MG tablet Take 50 mg by mouth 2 times daily WILL START IN THREE WEEKS, (AFTER BEING OFF AMIODARONE X 3 WEEKS)  Historical Provider, MD   spironolactone (ALDACTONE) 25 MG tablet Take 25 mg by mouth 2 times daily  Historical Provider, MD   apixaban (ELIQUIS) 5 MG TABS tablet Take 1 tablet by mouth 2 times daily  ASHLEY Farris - CNP   Elastic Bandages & Supports ([facility]) [facility] 1 each by Does not apply route daily Knee high 20mmHg  Patient not taking: Reported on 7/29/2021  ASHLEY Farris - CNP       Review of Systems : All systems reviewed, all unremarkable other than HPI/subjective. No change since last visit. Denies  fever, chills, infection or non healing wound. Physical Exam  Constitutional:       General: She is not in acute distress. HENT:      Head: Atraumatic. Pulmonary:      Effort: Pulmonary effort is normal.   Neurological:      Mental Status: She is alert and oriented to person, place, and time. Psychiatric:         Behavior: Behavior normal.         Assessment and Plan:      Diagnosis Orders   1. Fibromyalgia  pregabalin (LYRICA) 75 MG capsule   2. Cervical radiculopathy         Options discussed. Trial of lyrica for fibromyalgia. For neck pain, will consider MRI of pain is still persistent after finishing PT.      FF up in 3 months for now,     All questions were answered and imaging studies reviewed with the patient. Rafy Sommer was evaluated through a synchronous (real-time) audio-video encounter. The patient (or guardian if applicable) is aware that this is a billable service. Verbal consent to proceed has been obtained within the past 12 months. The visit was conducted pursuant to the emergency declaration under the [address] [place] [address], [address] [place] [address] and the [employer] and [employer] General Act. Patient identification was verified, and a caregiver was present when appropriate. The patient was located in a state where the provider was credentialed to provide care. Total time spent for this encounter: 25 minutes    --Seng Tellez MD on 9/10/2021 at 10:50 AM    An electronic signature was used to authenticate this note.           Sydni Gonzalez MD   Spine Medicine/PM&R

## 2021-09-21 RX ORDER — FLECAINIDE ACETATE 50 MG/1
TABLET ORAL
Qty: 180 TABLET | Refills: 3 | Status: SHIPPED | OUTPATIENT
Start: 2021-09-21 | End: 2022-10-05 | Stop reason: SDUPTHER

## 2021-10-25 ENCOUNTER — PATIENT MESSAGE (OUTPATIENT)
Dept: FAMILY MEDICINE CLINIC | Age: 61
End: 2021-10-25

## 2021-10-25 RX ORDER — SPIRONOLACTONE 25 MG/1
25 TABLET ORAL 2 TIMES DAILY
Qty: 60 TABLET | Refills: 5 | Status: SHIPPED | OUTPATIENT
Start: 2021-10-25 | End: 2021-12-06 | Stop reason: SDUPTHER

## 2021-10-25 NOTE — TELEPHONE ENCOUNTER
From: Tomeka Healy  To: ASHLEY Geiger - MARIAN  Sent: 10/25/2021 10:24 AM EDT  Subject: Prescription Question    Why am I unable to request a refill on spironolactone?

## 2021-10-25 NOTE — TELEPHONE ENCOUNTER
From: Kayleigh Pham  To:  ASHLEY Alcazar CNP  Sent: 10/25/2021 10:20 AM EDT  Subject: Prescription Question    [facility] needs a prescription to order supplies for my CPAP machine

## 2021-11-01 ENCOUNTER — HOSPITAL ENCOUNTER (OUTPATIENT)
Age: 61
Discharge: HOME | End: 2021-11-01
Payer: COMMERCIAL

## 2021-11-01 LAB
ALBUMIN SERPL-MCNC: 3.8 G/DL (ref 3.5–5.2)
ANION GAP SERPL CALCULATED.3IONS-SCNC: 7 MMOL/L (ref 9–17)
BUN BLDV-MCNC: 21 MG/DL (ref 8–23)
CALCIUM SERPL-MCNC: 9.3 MG/DL (ref 8.6–10.4)
CHLORIDE BLD-SCNC: 104 MMOL/L (ref 98–107)
CO2: 28 MMOL/L (ref 20–31)
CREAT SERPL-MCNC: 1.16 MG/DL (ref 0.5–0.9)
GFR AFRICAN AMERICAN: 58 ML/MIN
GFR NON-AFRICAN AMERICAN: 47 ML/MIN
GFR SERPL CREATININE-BSD FRML MDRD: ABNORMAL ML/MIN/{1.73_M2}
GFR SERPL CREATININE-BSD FRML MDRD: ABNORMAL ML/MIN/{1.73_M2}
HCT VFR BLD CALC: 39.3 % (ref 36–46)
HEMOGLOBIN: 13.2 G/DL (ref 12–16)
PHOSPHORUS: 3.4 MG/DL (ref 2.6–4.5)
POTASSIUM SERPL-SCNC: 4.9 MMOL/L (ref 3.7–5.3)
SODIUM BLD-SCNC: 139 MMOL/L (ref 135–144)

## 2021-11-01 PROCEDURE — 36415 COLL VENOUS BLD VENIPUNCTURE: CPT

## 2021-11-01 PROCEDURE — 83883 ASSAY NEPHELOMETRY NOT SPEC: CPT

## 2021-11-01 PROCEDURE — 84165 PROTEIN E-PHORESIS SERUM: CPT

## 2021-11-01 PROCEDURE — 84155 ASSAY OF PROTEIN SERUM: CPT

## 2021-11-01 PROCEDURE — 84100 ASSAY OF PHOSPHORUS: CPT

## 2021-11-01 PROCEDURE — 84156 ASSAY OF PROTEIN URINE: CPT

## 2021-11-01 PROCEDURE — 82310 ASSAY OF CALCIUM: CPT

## 2021-11-01 PROCEDURE — 82570 ASSAY OF URINE CREATININE: CPT

## 2021-11-01 PROCEDURE — 80051 ELECTROLYTE PANEL: CPT

## 2021-11-01 PROCEDURE — 86335 IMMUNFIX E-PHORSIS/URINE/CSF: CPT

## 2021-11-01 PROCEDURE — 82565 ASSAY OF CREATININE: CPT

## 2021-11-01 PROCEDURE — 85014 HEMATOCRIT: CPT

## 2021-11-01 PROCEDURE — 85018 HEMOGLOBIN: CPT

## 2021-11-01 PROCEDURE — 84520 ASSAY OF UREA NITROGEN: CPT

## 2021-11-01 PROCEDURE — 82040 ASSAY OF SERUM ALBUMIN: CPT

## 2021-11-01 PROCEDURE — 86334 IMMUNOFIX E-PHORESIS SERUM: CPT

## 2021-11-02 LAB
ALBUMIN (CALCULATED): 4.3 G/DL (ref 3.2–5.2)
ALBUMIN PERCENT: 62 % (ref 45–65)
ALPHA 1 PERCENT: 3 % (ref 3–6)
ALPHA 2 PERCENT: 12 % (ref 6–13)
ALPHA-1-GLOBULIN: 0.2 G/DL (ref 0.1–0.4)
ALPHA-2-GLOBULIN: 0.8 G/DL (ref 0.5–0.9)
BETA GLOBULIN: 0.8 G/DL (ref 0.5–1.1)
BETA PERCENT: 11 % (ref 11–19)
CREATININE URINE: 169.6 MG/DL (ref 28–217)
FREE KAPPA/LAMBDA RATIO: 1.11 (ref 0.26–1.65)
GAMMA GLOBULIN %: 12 % (ref 9–20)
GAMMA GLOBULIN: 0.8 G/DL (ref 0.5–1.5)
KAPPA FREE LIGHT CHAINS QNT: 2.95 MG/DL (ref 0.37–1.94)
LAMBDA FREE LIGHT CHAINS QNT: 2.65 MG/DL (ref 0.57–2.63)
PATHOLOGIST: NORMAL
PATHOLOGIST: NORMAL
PROTEIN ELECTROPHORESIS, SERUM: NORMAL
SERUM IFX INTERP: NORMAL
TOTAL PROT. SUM,%: 100 % (ref 98–102)
TOTAL PROT. SUM: 6.9 G/DL (ref 6.3–8.2)
TOTAL PROTEIN, URINE: 26 MG/DL
TOTAL PROTEIN: 6.9 G/DL (ref 6.4–8.3)

## 2021-11-03 LAB
URINE IFX INTERP: NORMAL
URINE IFX SPECIMEN: NORMAL
URINE TOTAL PROTEIN: 26 MG/DL
VOLUME: NORMAL ML

## 2021-12-02 RX ORDER — CELECOXIB 100 MG/1
100 CAPSULE ORAL 2 TIMES DAILY
Qty: 180 CAPSULE | Refills: 1 | Status: SHIPPED | OUTPATIENT
Start: 2021-12-02 | End: 2022-02-07 | Stop reason: SDUPTHER

## 2021-12-02 NOTE — TELEPHONE ENCOUNTER
Rx pending for celebrex
Once 06/01/2022   EKG 12 Lead Once 12/01/2021       Next Visit Date:  Future Appointments   Date Time Provider Kya Trimble   12/10/2021 10:00 AM John Maguire MD [facility]   5/24/2022  2:00 PM Dearl Castleman, MD [facility]            Patient Active Problem List:     Fibromyalgia     Follicular adenoma of thyroid gland     S/P laparoscopic cholecystectomy     Right hip pain     Bilateral leg edema     Chronic fatigue     Left thyroid nodule     Chronic back pain greater than 3 months duration     Shortness of breath     Pulmonary hypertension associated with unclear multi-factorial mechanisms ([facility]     Morbid obesity due to excess calories (HCC)     MAYURI (obstructive sleep apnea)     Essential hypertension     Coxitis     History of total hip replacement     Gastroesophageal reflux disease without esophagitis     Polyarthritis     Mixed incontinence     Constipation     Acute cystitis without hematuria     RLS (restless legs syndrome)     Cellulitis of leg, right     PSVT (paroxysmal supraventricular tachycardia) (HCC)     Cellulitis of right leg     Venous insufficiency of both lower extremities     Encounter for monitoring amiodarone therapy

## 2021-12-06 RX ORDER — SPIRONOLACTONE 25 MG/1
25 TABLET ORAL 2 TIMES DAILY
Qty: 60 TABLET | Refills: 5 | Status: SHIPPED | OUTPATIENT
Start: 2021-12-06 | End: 2022-07-07 | Stop reason: SDUPTHER

## 2021-12-06 RX ORDER — FAMOTIDINE 20 MG/1
20 TABLET, FILM COATED ORAL 2 TIMES DAILY
Qty: 60 TABLET | Refills: 2 | Status: SHIPPED | OUTPATIENT
Start: 2021-12-06 | End: 2022-01-31 | Stop reason: SDUPTHER

## 2021-12-10 ENCOUNTER — TELEMEDICINE (OUTPATIENT)
Dept: PAIN MANAGEMENT | Age: 61
End: 2021-12-10
Payer: COMMERCIAL

## 2021-12-10 DIAGNOSIS — M79.7 FIBROMYALGIA: ICD-10-CM

## 2021-12-10 PROCEDURE — 99442 PR PHYS/QHP TELEPHONE EVALUATION 11-20 MIN: CPT | Performed by: PHYSICAL MEDICINE & REHABILITATION

## 2021-12-10 RX ORDER — PREGABALIN 75 MG/1
75 CAPSULE ORAL 3 TIMES DAILY
Qty: 90 CAPSULE | Refills: 2 | Status: SHIPPED
Start: 2021-12-10 | End: 2022-01-21 | Stop reason: ALTCHOICE

## 2021-12-10 RX ORDER — METHYLPREDNISOLONE 4 MG/1
TABLET ORAL
Qty: 1 KIT | Refills: 0 | Status: SHIPPED | OUTPATIENT
Start: 2021-12-10 | End: 2021-12-16

## 2021-12-10 NOTE — PROGRESS NOTES
FOLLOW UP APPOINTMENT:         Anival Puckett MD    12/10/2021       Kayleigh Pham is a 64 y.o. female, who came for a  follow up to discuss treatment options    Cause of the symptom(s): fibromyalgia    Onset: chronic pain     Prior reatment done: Lyrica    Current pain level: 5 on the scale of 0-10 ( 10 being worst)     Quality of Symptoms: aching, throbbing, burning and shooting    Aggravating factors: activity    Relieving factors: rest, lying down and use of medication        Allergies   Allergen Reactions    Ampicillin Rash    Sulfa Antibiotics Rash       Social History     Socioeconomic History    Marital status:      Spouse name: Not on file    Number of children: Not on file    Years of education: Not on file    Highest education level: Not on file   Occupational History    Not on file   Tobacco Use    Smoking status: Never Smoker    Smokeless tobacco: Never Used   Vaping Use    Vaping Use: Never used   Substance and Sexual Activity    Alcohol use: No     Alcohol/week: 0.0 standard drinks     Comment: once in 10 years may have a glass wine    Drug use: No    Sexual activity: Yes     Partners: Male   Other Topics Concern    Not on file   Social History Narrative    Not on file     Social Determinants of Health     Financial Resource Strain: Low Risk     Difficulty of Paying Living Expenses: Not hard at all   Food Insecurity: No Food Insecurity    Worried About Running Out of Food in the Last Year: Never true    Neto of Food in the Last Year: Never true   Transportation Needs:     Lack of Transportation (Medical): Not on file    Lack of Transportation (Non-Medical):  Not on file   Physical Activity:     Days of Exercise per Week: Not on file    Minutes of Exercise per Session: Not on file   Stress:     Feeling of Stress : Not on file   Social Connections:     Frequency of Communication with Friends and Family: Not on file    Frequency of Social Gatherings with Friends and Family: Not on file    Attends Shinto Services: Not on file    Active Member of Clubs or Organizations: Not on file    Attends Club or Organization Meetings: Not on file    Marital Status: Not on file   Intimate Partner Violence:     Fear of Current or Ex-Partner: Not on file    Emotionally Abused: Not on file    Physically Abused: Not on file    Sexually Abused: Not on file   Housing Stability:     Unable to Pay for Housing in the Last Year: Not on file    Number of [place] in the Last Year: Not on file    Unstable Housing in the Last Year: Not on file       Past Medical History:   Diagnosis Date    Acid reflux     [facility]     Blood type O+     Bronchitis     Chronic back pain     Depression     Diverticulitis     DVT (deep venous thrombosis) ([facility] 2012    Left leg    Fibromyalgia     Hypertension     Internal hemorrhoid     Left thyroid nodule 7/25/2013    Nausea & vomiting     Pulmonary embolism ([facility]        Past Surgical History:   Procedure Laterality Date    BACK INJECTION Bilateral 6/4/2021    SACROILIAC JOINT INJECTION BILATERAL performed by Amador Blanchard MD [facility]  08/22/2013    laparoscopic    COLONOSCOPY      2012/ Dr. James Ask Left        Family History   Problem Relation Age of Onset    Stroke Father     Diabetes Sister     Thyroid Disease Brother     Atrial Fibrillation Brother     Atrial Fibrillation Mother         Prior to Visit Medications    Medication Sig Taking? Authorizing Provider   pregabalin (LYRICA) 75 MG capsule Take 1 capsule by mouth 3 times daily for 30 days.  Yes Amador Blanchard MD   famotidine (PEPCID) 20 MG tablet Take 1 tablet by mouth 2 times daily  ASHLEY Crystal - CNP   spironolactone (ALDACTONE) 25 MG tablet Take 1 tablet by mouth 2 times daily  ASHLEY Crystal CNP   celecoxib (CELEBREX) 100 MG capsule Take 1 capsule by mouth 2 times daily  ASHLEY Carreon CNP   flecainide (TAMBOCOR) 50 MG tablet TAKE ONE TABLET BY MOUTH TWICE A DAY  Dexter Shah MD   amitriptyline (ELAVIL) 100 MG tablet Take 1 tablet by mouth nightly  ASHLEY Carreon CNP   metoprolol succinate (TOPROL XL) 50 MG extended release tablet Take 1 tablet by mouth daily  Dexter Shah MD   CARTIA  MG extended release capsule TAKE ONE CAPSULE BY MOUTH DAILY  Francis Sears DO   rOPINIRole (REQUIP) 0.5 MG tablet TAKE ONE TABLET BY MOUTH THREE TIMES A DAY  Go Gerardo MD   furosemide (LASIX) 20 MG tablet TAKE ONE TABLET BY MOUTH DAILY  Dexter Shah MD   Cholecalciferol (VITAMIN D) 50 MCG (2000 UT) CAPS capsule Take 2,000 Units by mouth daily  Historical Provider, MD   apixaban (ELIQUIS) 5 MG TABS tablet Take 1 tablet by mouth 2 times daily  ASHLEY Carreon CNP   Elastic Bandages & Supports ([facility] ) [facility] 1 each by Does not apply route daily Knee high 20mmHg  Patient not taking: Reported on 7/29/2021  ASHLEY Carreon CNP         Review of Systems :All systems reviewed, all unremarkable other than HPI/subjective. No change since last visit. Denies  fever, chills, infection or non healing wound. Physical Exam Alert. Oriented. Assessment and Plan:      Diagnosis Orders   1. Fibromyalgia  pregabalin (LYRICA) 75 MG capsule       Trial of medrol dose pack for now. Continue lyrica, Refill today. Increase dose to TID 75 mg. Jessica Vargas is a 64 y.o. female evaluated via telephone on 12/10/2021.       Consent:  She and/or health care decision maker is aware that that she may receive a bill for this telephone service, depending on her insurance coverage, and has provided verbal consent to proceed: Yes      I affirm this is a Patient Initiated Episode with a Patient who has not had a related appointment within my department in the past 7 days or scheduled within the next 24 hours. Patient identification was verified at the start of the visit: Yes    Total Time: minutes: 5-10 minutes    The visit was conducted pursuant to the emergency declaration under the [address] [place] [address] and the [employer] and [employer] General Act. Patient identification was verified, and a caregiver was present when appropriate. The patient was located in a state where the provider was credentialed to provide care.     Note: not billable if this call serves to triage the patient into an appointment for the relevant concern      [place] MD

## 2022-01-17 ENCOUNTER — HOSPITAL ENCOUNTER (OUTPATIENT)
Age: 62
Discharge: HOME | End: 2022-01-17
Payer: COMMERCIAL

## 2022-01-17 ENCOUNTER — OFFICE VISIT (OUTPATIENT)
Dept: FAMILY MEDICINE CLINIC | Age: 62
End: 2022-01-17
Payer: COMMERCIAL

## 2022-01-17 VITALS
SYSTOLIC BLOOD PRESSURE: 112 MMHG | WEIGHT: 293 LBS | BODY MASS INDEX: 45.99 KG/M2 | HEIGHT: 67 IN | HEART RATE: 74 BPM | DIASTOLIC BLOOD PRESSURE: 74 MMHG | RESPIRATION RATE: 20 BRPM | OXYGEN SATURATION: 98 %

## 2022-01-17 DIAGNOSIS — K08.89 TOOTH PAIN: ICD-10-CM

## 2022-01-17 DIAGNOSIS — K11.20 SALIVARY GLAND INFECTION: ICD-10-CM

## 2022-01-17 DIAGNOSIS — R22.0 FACIAL SWELLING: Primary | ICD-10-CM

## 2022-01-17 DIAGNOSIS — R29.898 DECREASED GRIP STRENGTH: ICD-10-CM

## 2022-01-17 DIAGNOSIS — R22.0 FACIAL SWELLING: ICD-10-CM

## 2022-01-17 LAB
ABSOLUTE EOS #: 0 K/UL (ref 0–0.4)
ABSOLUTE IMMATURE GRANULOCYTE: ABNORMAL K/UL (ref 0–0.3)
ABSOLUTE LYMPH #: 1.8 K/UL (ref 1–4.8)
ABSOLUTE MONO #: 1 K/UL (ref 0–1)
BASOPHILS # BLD: 1 % (ref 0–2)
BASOPHILS ABSOLUTE: 0 K/UL (ref 0–0.2)
DIFFERENTIAL TYPE: YES
EOSINOPHILS RELATIVE PERCENT: 0 % (ref 0–5)
FOLATE: 4.8 NG/ML
HCT VFR BLD CALC: 38.9 % (ref 36–46)
HEMOGLOBIN: 13.1 G/DL (ref 12–16)
IMMATURE GRANULOCYTES: ABNORMAL %
LYMPHOCYTES # BLD: 19 % (ref 15–40)
MCH RBC QN AUTO: 29.2 PG (ref 26–34)
MCHC RBC AUTO-ENTMCNC: 33.7 G/DL (ref 31–37)
MCV RBC AUTO: 86.8 FL (ref 80–100)
MONOCYTES # BLD: 10 % (ref 4–8)
NRBC AUTOMATED: ABNORMAL PER 100 WBC
PDW BLD-RTO: 16 % (ref 12.1–15.2)
PLATELET # BLD: 275 K/UL (ref 140–450)
PLATELET ESTIMATE: ABNORMAL
PMV BLD AUTO: ABNORMAL FL (ref 6–12)
RBC # BLD: 4.48 M/UL (ref 4–5.2)
RBC # BLD: ABNORMAL 10*6/UL
SEG NEUTROPHILS: 70 % (ref 47–75)
SEGMENTED NEUTROPHILS ABSOLUTE COUNT: 7 K/UL (ref 2.5–7)
VITAMIN B-12: 1075 PG/ML (ref 232–1245)
WBC # BLD: 9.8 K/UL (ref 3.5–11)
WBC # BLD: ABNORMAL 10*3/UL

## 2022-01-17 PROCEDURE — 85025 COMPLETE CBC W/AUTO DIFF WBC: CPT

## 2022-01-17 PROCEDURE — 99214 OFFICE O/P EST MOD 30 MIN: CPT | Performed by: STUDENT IN AN ORGANIZED HEALTH CARE EDUCATION/TRAINING PROGRAM

## 2022-01-17 PROCEDURE — 82746 ASSAY OF FOLIC ACID SERUM: CPT

## 2022-01-17 PROCEDURE — 82607 VITAMIN B-12: CPT

## 2022-01-17 PROCEDURE — 36415 COLL VENOUS BLD VENIPUNCTURE: CPT

## 2022-01-17 RX ORDER — AMOXICILLIN AND CLAVULANATE POTASSIUM 875; 125 MG/1; MG/1
1 TABLET, FILM COATED ORAL 2 TIMES DAILY
Qty: 14 TABLET | Refills: 0 | Status: SHIPPED | OUTPATIENT
Start: 2022-01-17 | End: 2022-01-24

## 2022-01-17 ASSESSMENT — PATIENT HEALTH QUESTIONNAIRE - PHQ9
SUM OF ALL RESPONSES TO PHQ QUESTIONS 1-9: 0
SUM OF ALL RESPONSES TO PHQ9 QUESTIONS 1 & 2: 0
SUM OF ALL RESPONSES TO PHQ QUESTIONS 1-9: 0
2. FEELING DOWN, DEPRESSED OR HOPELESS: 0
SUM OF ALL RESPONSES TO PHQ QUESTIONS 1-9: 0
SUM OF ALL RESPONSES TO PHQ QUESTIONS 1-9: 0
1. LITTLE INTEREST OR PLEASURE IN DOING THINGS: 0

## 2022-01-17 ASSESSMENT — ENCOUNTER SYMPTOMS
VOMITING: 0
FACIAL SWELLING: 1
NAUSEA: 0
RHINORRHEA: 0
WHEEZING: 0
ABDOMINAL PAIN: 0
DIARRHEA: 0
COUGH: 0
SINUS PAIN: 0

## 2022-01-17 NOTE — PROGRESS NOTES
HPI Notes    Name: Asmita Carpenter  : 1960         Chief Complaint:     Chief Complaint   Patient presents with    Facial Swelling     Pt states she a teeth pain started bottom left and then the top left  has been taking tylenol  states half of her face is swollen. onset friday        History of Present Illness:      HPI    This is a 64year old woman presenting for evaluation of left-sided facial swelling. This has been ongoing since three days ago. She is concerned that she may have a dental infection as she has marked tooth pain, particularly the bottom left and top left teeth. She also endorses foul taste in the mouth, and feels the swelling has worsened around the orbit today as well. She denies fever, chills, rash, hemoptysis. This has never happened to her before, but she does have a history of dry mouth. Additionally, she mentions that she is noticing intermittent deterioration of her penmanship after writing; even momentarily when writing a check. She first noticed this several months ago (\"maybe six months\"). She also endorses that she is now dropping items more frequently, but denies any sensory deficit over the hands, and no other focal neurological deficits, visual problems, tinnitus, or abnormal body movements.     Past Medical History:     Past Medical History:   Diagnosis Date    Acid reflux     Bleeding tendency (HCC)     Blood type O+     Bronchitis     Chronic back pain     Depression     Diverticulitis     DVT (deep venous thrombosis) ([facility] [address]     Left leg    Fibromyalgia     Hypertension     Internal hemorrhoid     Left thyroid nodule 2013    Nausea & vomiting     Pulmonary embolism (HCC)       Reviewed all health maintenance requirements and ordered appropriate tests  Health Maintenance Due   Topic Date Due    COVID-19 Vaccine (1) Never done    DTaP/Tdap/Td vaccine (1 - Tdap) Never done    Cervical cancer screen  Never done    Shingles Vaccine (1 of 2) Never done  Breast cancer screen  05/10/2018    Flu vaccine (1) 09/01/2021    A1C test (Diabetic or Prediabetic)  10/27/2021    Depression Screen  01/21/2022       Past Surgical History:     Past Surgical History:   Procedure Laterality Date    BACK INJECTION Bilateral 6/4/2021    SACROILIAC JOINT INJECTION BILATERAL performed by Rodriguez Stallworth MD at [facility].  08/22/2013    laparoscopic    COLONOSCOPY      2012/ Dr. Miroslava Bey Left         Medications:       Prior to Admission medications    Medication Sig Start Date End Date Taking?  Authorizing Provider   amoxicillin-clavulanate (AUGMENTIN) 875-125 MG per tablet Take 1 tablet by mouth 2 times daily for 7 days 1/17/22 1/24/22 Yes Clarice Miller,    famotidine (PEPCID) 20 MG tablet Take 1 tablet by mouth 2 times daily 12/6/21  Yes ASHLEY Yoo CNP   spironolactone (ALDACTONE) 25 MG tablet Take 1 tablet by mouth 2 times daily 12/6/21  Yes ASHLEY Yoo CNP   celecoxib (CELEBREX) 100 MG capsule Take 1 capsule by mouth 2 times daily 12/2/21  Yes ASHLEY Yoo CNP   flecainide (TAMBOCOR) 50 MG tablet TAKE ONE TABLET BY MOUTH TWICE A DAY 9/21/21  Yes Naren Ramsey MD   amitriptyline (ELAVIL) 100 MG tablet Take 1 tablet by mouth nightly 8/30/21  Yes ASHLEY Yoo CNP   metoprolol succinate (TOPROL XL) 50 MG extended release tablet Take 1 tablet by mouth daily 8/16/21  Yes Naren Ramsey MD   CARTIA  MG extended release capsule TAKE ONE CAPSULE BY MOUTH DAILY 8/16/21  Yes Kelsea Connolly,    rOPINIRole (REQUIP) 0.5 MG tablet TAKE ONE TABLET BY MOUTH THREE TIMES A DAY 7/13/21  Yes Elizabeth Tee MD   furosemide (LASIX) 20 MG tablet TAKE ONE TABLET BY MOUTH DAILY 7/12/21  Yes Naren Ramsey MD   Cholecalciferol (VITAMIN D) 50 MCG (2000 UT) CAPS capsule Take 2,000 Units by mouth daily   Yes Historical Provider, MD   apixaban (ELIQUIS) 5 MG TABS tablet Take 1 tablet by mouth 2 times daily 1/28/21  Yes Jamila Kenyon APRN - CNP   pregabalin (LYRICA) 75 MG capsule Take 1 capsule by mouth 3 times daily for 30 days. 12/10/21 1/9/22  Jes Simmons MD   Elastic Bandages & Supports ([address]) [address] each by Does not apply route daily Knee high 20mmHg  Patient not taking: Reported on 7/29/2021 1/3/19   Jamila Kenyon, APRN - CNP        Allergies:       Ampicillin and Sulfa antibiotics    Social History:     Tobacco:    reports that she has never smoked. She has never used smokeless tobacco.  Alcohol:      reports no history of alcohol use. Drug Use:  reports no history of drug use. Family History:     Family History   Problem Relation Age of Onset    Stroke Father     Diabetes Sister     Thyroid Disease Brother     Atrial Fibrillation Brother     Atrial Fibrillation Mother        Review of Systems:     Review of Systems   Constitutional: Negative for fever. HENT: Positive for facial swelling. Negative for rhinorrhea, sinus pain and sneezing. Respiratory: Negative for cough and wheezing. Cardiovascular: Negative for chest pain. Gastrointestinal: Negative for abdominal pain, diarrhea, nausea and vomiting. Skin: Negative for rash. Neurological: Positive for weakness. Negative for dizziness, tremors, numbness and headaches. Psychiatric/Behavioral: Negative for sleep disturbance. Physical Exam:     Vitals:  /74 (Site: Right Lower Arm, Position: Sitting, Cuff Size: Large Adult)   Pulse 74   Resp 20   Ht 5' 7\" (1.702 m)   Wt (!) 380 lb (172.4 kg)   SpO2 98%   BMI 59.52 kg/m²     Physical Exam  Vitals and nursing note reviewed. Constitutional:       General: She is not in acute distress. Appearance: Normal appearance. She is obese.    HENT:      Head:      Comments: Left angle of mandible swollen compared to right     Right Ear: Tympanic membrane, ear canal and external ear normal. There is no impacted cerumen. Left Ear: Tympanic membrane, ear canal and external ear normal. There is no impacted cerumen. Nose: Nose normal. No congestion or rhinorrhea. Mouth/Throat:      Mouth: Mucous membranes are moist.      Pharynx: Oropharynx is clear. No oropharyngeal exudate or posterior oropharyngeal erythema. Eyes:      Conjunctiva/sclera: Conjunctivae normal.   Neck:      Comments: Left anterior neck swelling compared to right. Musculoskeletal:      Cervical back: Tenderness present. Skin:     General: Skin is warm and dry. Capillary Refill: Capillary refill takes less than 2 seconds. Neurological:      General: No focal deficit present. Mental Status: She is alert and oriented to person, place, and time. Mental status is at baseline. Cranial Nerves: No cranial nerve deficit. Sensory: No sensory deficit. Comments:  strength, and UE strength 5/5 bilaterally. No sensory deficit over C5-7 dermatomes   Psychiatric:         Mood and Affect: Mood normal.         Behavior: Behavior normal.         Thought Content:  Thought content normal.             Data:     Lab Results   Component Value Date     11/01/2021    K 4.9 11/01/2021     11/01/2021    CO2 28 11/01/2021    BUN 21 11/01/2021    CREATININE 1.16 11/01/2021    GLUCOSE 119 05/27/2021    PROT 6.9 11/01/2021    LABALBU 3.8 11/01/2021    BILITOT 0.43 05/27/2021    ALKPHOS 126 05/27/2021    AST 16 05/27/2021    ALT 17 05/27/2021     Lab Results   Component Value Date    WBC 4.9 05/27/2021    RBC 4.07 05/27/2021    RBC 4.17 05/15/2012    HGB 13.2 11/01/2021    HCT 39.3 11/01/2021    MCV 89.9 05/27/2021    MCH 30.8 05/27/2021    MCHC 34.2 05/27/2021    RDW 15.1 05/27/2021     05/27/2021     05/15/2012    MPV NOT REPORTED 05/27/2021     Lab Results   Component Value Date    TSH 2.47 05/27/2021     Lab Results   Component Value Date    CHOL 172 05/27/2021    CHOL 207 05/23/2019    HDL 43 05/27/2021    LABA1C 6.1 10/27/2020          Assessment & Plan        Diagnosis Orders   1. Facial swelling  CBC Auto Differential    amoxicillin-clavulanate (AUGMENTIN) 875-125 MG per tablet   2. Tooth pain  CBC Auto Differential    amoxicillin-clavulanate (AUGMENTIN) 875-125 MG per tablet   3. Salivary gland infection  CBC Auto Differential    amoxicillin-clavulanate (AUGMENTIN) 875-125 MG per tablet   4. Decreased  strength  Vitamin B12 & Folate       1. I believe that she has a salivary gland infection, specifically the left submandibular gland. Foul taste in her mouth along with localized swelling, skin hot to the touch is suggestive of this problem. She does have a listed allergy to ampicillin, but relates she has successfully been treated for additional complaints with amoxicillin before and developed no side effects. I believe would be safe to treat her with Augmentin. The patient I had a long discussion about starting Augmentin. We discussed its mechanism of action, intended goals, adverse effects, as well as common side effects. They were able to verbalize understanding, and repeat plan back to me. She is also specifically interested in how she can prevent a possible recurrence of her salivary gland infection, and further treatment of her dry mouth as she states this is not an active problem being addressed at this time. I would like to review the literature regarding recommendations for this problem and then contact her once we have callout her lab results. I would like to order a CBC to assess her white blood cell count and whether or not there is a left shift. 4. This is a very peculiar problem, she does relate she is a vegetarian and has been so for the majority of her life. She does however consume dairy products as well as eggs. She relates she eats these items infrequently.   She has no appreciable sensory deficit over the upper extremities and  strength is equal between the 2 as his upper extremity strength in other planes of motion. I am not concerned for a movement disorder as she has no other tremors or abnormal movements, neither my concern for local sclerosis or any other central nervous system defect that she has not really no other neurological defects. I believe that a reasonable entity to assess would be vitamin B12 and folate deficiency, which she is certainly at risk for with her vegetarian lifestyle. Follow up as needed for these problems. Completed Refills   Requested Prescriptions     Signed Prescriptions Disp Refills    amoxicillin-clavulanate (AUGMENTIN) 875-125 MG per tablet 14 tablet 0     Sig: Take 1 tablet by mouth 2 times daily for 7 days     No follow-ups on file. Orders Placed This Encounter   Medications    amoxicillin-clavulanate (AUGMENTIN) 875-125 MG per tablet     Sig: Take 1 tablet by mouth 2 times daily for 7 days     Dispense:  14 tablet     Refill:  0     Orders Placed This Encounter   Procedures    CBC Auto Differential     Standing Status:   Future     Standing Expiration Date:   1/17/2023    Vitamin B12 & Folate     Standing Status:   Future     Standing Expiration Date:   1/17/2023         There are no Patient Instructions on file for this visit. Electronically signed by Deya Carver DO on 1/17/2022 at 1:29 PM           Completed Refills   Requested Prescriptions     Signed Prescriptions Disp Refills    amoxicillin-clavulanate (AUGMENTIN) 875-125 MG per tablet 14 tablet 0     Sig: Take 1 tablet by mouth 2 times daily for 7 days         Marifer received counseling on the following healthy behaviors: nutrition and exercise  Reviewed prior labs and health maintenance. Continue current medications, diet and exercise. Discussed use, benefit, and side effects of prescribed medications. Barriers to medication compliance addressed.    Patient given educational materials - see patient instructions. All patient questions answered. Patient voiced understanding.

## 2022-01-21 ENCOUNTER — OFFICE VISIT (OUTPATIENT)
Dept: PAIN MANAGEMENT | Age: 62
End: 2022-01-21
Payer: COMMERCIAL

## 2022-01-21 DIAGNOSIS — M79.7 FIBROMYALGIA: Primary | ICD-10-CM

## 2022-01-21 PROCEDURE — 99213 OFFICE O/P EST LOW 20 MIN: CPT | Performed by: PHYSICAL MEDICINE & REHABILITATION

## 2022-01-21 RX ORDER — PREGABALIN 150 MG/1
150 CAPSULE ORAL 2 TIMES DAILY
Qty: 60 CAPSULE | Refills: 1 | Status: SHIPPED | OUTPATIENT
Start: 2022-01-21 | End: 2022-04-05

## 2022-01-21 NOTE — PROGRESS NOTES
FOLLOW UP APPOINTMENT:         Kiet Lewis MD    1/21/2022       Arthur Donaldson is a 64 y.o. female, who came for a  follow up to discuss treatment options    Cause of the symptom(s): myofascial pain  and degenerative (arthritis)    Onset: chronic    Worse in am - that is, myofascial pain. Prior reatment done: on lyrica at 75 mg TID. She complains of generalized weakness . Current pain level: 4 on the scale of 0-10 ( 10 being worst)       Allergies   Allergen Reactions    Ampicillin Rash    Sulfa Antibiotics Rash       Social History     Socioeconomic History    Marital status:      Spouse name: Not on file    Number of children: Not on file    Years of education: Not on file    Highest education level: Not on file   Occupational History    Not on file   Tobacco Use    Smoking status: Never Smoker    Smokeless tobacco: Never Used   Vaping Use    Vaping Use: Never used   Substance and Sexual Activity    Alcohol use: No     Alcohol/week: 0.0 standard drinks     Comment: once in 10 years may have a glass wine    Drug use: No    Sexual activity: Yes     Partners: Male   Other Topics Concern    Not on file   Social History Narrative    Not on file     Social Determinants of Health     Financial Resource Strain: Low Risk     Difficulty of Paying Living Expenses: Not hard at all   Food Insecurity: No Food Insecurity    Worried About Running Out of Food in the Last Year: Never true    Neto of Food in the Last Year: Never true   Transportation Needs:     Lack of Transportation (Medical): Not on file    Lack of Transportation (Non-Medical):  Not on file   Physical Activity:     Days of Exercise per Week: Not on file    Minutes of Exercise per Session: Not on file   Stress:     Feeling of Stress : Not on file   Social Connections:     Frequency of Communication with Friends and Family: Not on file    Frequency of Social Gatherings with Friends and Family: Not on file   Jeanna Gutierrez Attends [employer] Services: Not on file    Active Member of Clubs or Organizations: Not on file    Attends Club or Organization Meetings: Not on file    Marital Status: Not on file   Intimate Partner Violence:     Fear of Current or Ex-Partner: Not on file    Emotionally Abused: Not on file    Physically Abused: Not on file    Sexually Abused: Not on file   Housing Stability:     Unable to Pay for Housing in the Last Year: Not on file    Number of [place] in the Last Year: Not on file    Unstable Housing in the Last Year: Not on file       Past Medical History:   Diagnosis Date    Acid reflux     Bleeding tendency ([facility] [address]     Blood type O+     Bronchitis     Chronic back pain     Depression     Diverticulitis     DVT (deep venous thrombosis) ([facility] [address] 2012    Left leg    Fibromyalgia     Hypertension     Internal hemorrhoid     Left thyroid nodule 7/25/2013    Nausea & vomiting     Pulmonary embolism ([facility] [address]        Past Surgical History:   Procedure Laterality Date    BACK INJECTION Bilateral 6/4/2021    SACROILIAC JOINT INJECTION BILATERAL performed by Ines Hackett MD at [facility]  08/22/2013    laparoscopic    COLONOSCOPY      2012/ Dr. Mark Cleary Left        Family History   Problem Relation Age of Onset    Stroke Father     Diabetes Sister     Thyroid Disease Brother     Atrial Fibrillation Brother     Atrial Fibrillation Mother         Prior to Visit Medications    Medication Sig Taking? Authorizing Provider   amoxicillin-clavulanate (AUGMENTIN) 875-125 MG per tablet Take 1 tablet by mouth 2 times daily for 7 days  Audelia Glaser DO   pregabalin (LYRICA) 75 MG capsule Take 1 capsule by mouth 3 times daily for 30 days.   Ines Hackett MD   famotidine (PEPCID) 20 MG tablet Take 1 tablet by mouth 2 times daily  Malgorzata Carrillo APRN - CNP   spironolactone (ALDACTONE) 25 MG tablet Take 1 tablet by mouth 2 times daily  ASHLEY Ozuna CNP   celecoxib (CELEBREX) 100 MG capsule Take 1 capsule by mouth 2 times daily  ASHLEY Ozuna CNP   flecainide (TAMBOCOR) 50 MG tablet TAKE ONE TABLET BY MOUTH TWICE A DAY  Vicki Damon MD   amitriptyline (ELAVIL) 100 MG tablet Take 1 tablet by mouth nightly  ASHLEY Ozuna CNP   metoprolol succinate (TOPROL XL) 50 MG extended release tablet Take 1 tablet by mouth daily  Vciki Damon MD   CARTIA  MG extended release capsule TAKE ONE CAPSULE BY MOUTH DAILY  Felipe Hassan DO   rOPINIRole (REQUIP) 0.5 MG tablet TAKE ONE TABLET BY MOUTH THREE TIMES A DAY  Akilah Cuellar MD   furosemide (LASIX) 20 MG tablet TAKE ONE TABLET BY MOUTH DAILY  Vicki Damon MD   Cholecalciferol (VITAMIN D) 50 MCG (2000 UT) CAPS capsule Take 2,000 Units by mouth daily  Historical Provider, MD   apixaban (ELIQUIS) 5 MG TABS tablet Take 1 tablet by mouth 2 times daily  ASHLEY Ozuna CNP   Elastic Bandages & Supports ([address]) [address] [facility] 1 each by Does not apply route daily Knee high 20mmHg  Patient not taking: Reported on 7/29/2021  ASHLEY Ozuna CNP       Review of Systems: All systems reviewed, all unremarkable other than HPI/subjective. No change since last visit. Denies  fever, chills, infection or non healing wound. Physical Exam  HENT:      Head: Normocephalic. Pulmonary:      Effort: Pulmonary effort is normal. No respiratory distress. Musculoskeletal:         General: Tenderness present. Comments:  PSIS  Tenderness bilateral   Neurological:      Mental Status: She is alert and oriented to person, place, and time. Psychiatric:         Behavior: Behavior normal.       Assessment and Plan:      Diagnosis Orders   1. Fibromyalgia         Schedule for bilateral shoulder injection. Hold off on eliquis prior to injection. Increase the lyrica to 150 mg BID. FF up in 2 weeks. I have reviewed the chief complaint and HPI including the [facility] and Vital documentation by my staff and I agree with their documentation and have added where applicable. Time spent with patient was 25 minutes. More than 50% was spent counseling/coordinating the patient's care.          Vincent Grijalva MD   Spine Medicine/PM&R

## 2022-01-21 NOTE — PATIENT INSTRUCTIONS
SURVEY:    You may be receiving a survey from [employer] regarding your visit today. Please complete the survey to enable us to provide the highest quality of care to you and your family. If you cannot score us a very good on any question, please call the office to discuss how we could have made your experience a very good one. Thank you.   [address] [place] [address]  MD Kenny Brown MD Ok Berthold, MD Thea Navy, MD Robin Hair, AuD Alejos Lites, LPN  [facility] [place] [facility], [address] [place] [address]  KAYLA Blue

## 2022-01-31 RX ORDER — FAMOTIDINE 20 MG/1
20 TABLET, FILM COATED ORAL 2 TIMES DAILY
Qty: 60 TABLET | Refills: 2 | Status: SHIPPED | OUTPATIENT
Start: 2022-01-31 | End: 2022-06-02 | Stop reason: SDUPTHER

## 2022-01-31 RX ORDER — ROPINIROLE 0.5 MG/1
0.5 TABLET, FILM COATED ORAL 3 TIMES DAILY
Qty: 270 TABLET | Refills: 1 | Status: SHIPPED | OUTPATIENT
Start: 2022-01-31 | End: 2022-09-05 | Stop reason: SDUPTHER

## 2022-01-31 NOTE — TELEPHONE ENCOUNTER
Last OV: 1/17/2022  Last RX:    Next scheduled apt: Visit date not found    Patient requesting refill for Famotidine 20mg and Ropinirole 0.5mg through [facility].

## 2022-02-07 RX ORDER — CELECOXIB 100 MG/1
100 CAPSULE ORAL 2 TIMES DAILY
Qty: 180 CAPSULE | Refills: 1 | Status: SHIPPED | OUTPATIENT
Start: 2022-02-07 | End: 2022-02-15 | Stop reason: SDUPTHER

## 2022-02-07 NOTE — TELEPHONE ENCOUNTER
----- Message from Neftaly Chuy sent at 2/3/2022  1:07 PM EST -----  Subject: Medication Problem    QUESTIONS  Name of Medication? celecoxib (CELEBREX) 100 MG capsule  Patient-reported dosage and instructions? 1 capsule twice a day  What question or problem do you have with the medication? The patient   needs this medication refill wrote so she can mail it to [facility]   in [facility]  Preferred Pharmacy? [facility] - [place], [address] [place] [address] - F [phone]  Pharmacy phone number (if available)? [phone]  Additional Information for Provider?   ---------------------------------------------------------------------------  --------------  [address]  What is the best way for the office to contact you? OK to leave message on   voicemail  Preferred Call Back Phone Number? [phone]  ---------------------------------------------------------------------------  --------------  SCRIPT ANSWERS  Relationship to Patient?  Self

## 2022-02-11 ENCOUNTER — OFFICE VISIT (OUTPATIENT)
Dept: PAIN MANAGEMENT | Age: 62
End: 2022-02-11
Payer: COMMERCIAL

## 2022-02-11 DIAGNOSIS — M12.812 ROTATOR CUFF ARTHROPATHY OF BOTH SHOULDERS: Primary | ICD-10-CM

## 2022-02-11 DIAGNOSIS — M12.811 ROTATOR CUFF ARTHROPATHY OF BOTH SHOULDERS: Primary | ICD-10-CM

## 2022-02-11 PROCEDURE — 99213 OFFICE O/P EST LOW 20 MIN: CPT | Performed by: PHYSICAL MEDICINE & REHABILITATION

## 2022-02-11 NOTE — PROGRESS NOTES
FOLLOW UP APPOINTMENT:         Mary Ann Carbajal MD    2/11/2022       Jo Lefort is a 64 y.o. female, who came for a  follow up to discuss treatment options    Cause of the symptom(s): degenerative (arthritis)    Bilateral shoulder pain     Prior reatment done: physical therapy, injection, anti-inflammatory medication, muscle relaxant and steroid    Current pain level: 6 on the scale of 0-10 ( 10 being worst)     Quality of Symptoms: aching and throbbing    Aggravating factors: activity    Relieving factors: rest, lying down, use of medication and injection        Allergies   Allergen Reactions    Ampicillin Rash    Sulfa Antibiotics Rash       Social History     Socioeconomic History    Marital status:      Spouse name: Not on file    Number of children: Not on file    Years of education: Not on file    Highest education level: Not on file   Occupational History    Not on file   Tobacco Use    Smoking status: Never Smoker    Smokeless tobacco: Never Used   Vaping Use    Vaping Use: Never used   Substance and Sexual Activity    Alcohol use: No     Alcohol/week: 0.0 standard drinks     Comment: once in 10 years may have a glass wine    Drug use: No    Sexual activity: Yes     Partners: Male   Other Topics Concern    Not on file   Social History Narrative    Not on file     Social Determinants of Health     Financial Resource Strain: Low Risk     Difficulty of Paying Living Expenses: Not hard at all   Food Insecurity: No Food Insecurity    Worried About Running Out of Food in the Last Year: Never true    Neto of Food in the Last Year: Never true   Transportation Needs:     Lack of Transportation (Medical): Not on file    Lack of Transportation (Non-Medical):  Not on file   Physical Activity:     Days of Exercise per Week: Not on file    Minutes of Exercise per Session: Not on file   Stress:     Feeling of Stress : Not on file   Social Connections:     Frequency of Communication with Friends and Family: Not on file    Frequency of Social Gatherings with Friends and Family: Not on file    Attends Mormonism Services: Not on file    Active Member of Clubs or Organizations: Not on file    Attends Club or Organization Meetings: Not on file    Marital Status: Not on file   Intimate Partner Violence:     Fear of Current or Ex-Partner: Not on file    Emotionally Abused: Not on file    Physically Abused: Not on file    Sexually Abused: Not on file   Housing Stability:     Unable to Pay for Housing in the Last Year: Not on file    Number of [place] in the Last Year: Not on file    Unstable Housing in the Last Year: Not on file       Past Medical History:   Diagnosis Date    Acid reflux     Bleeding tendency ([facility] [address]     Blood type O+     Bronchitis     Chronic back pain     Depression     Diverticulitis     DVT (deep venous thrombosis) ([facility] [address] 2012    Left leg    Fibromyalgia     Hypertension     Internal hemorrhoid     Left thyroid nodule 7/25/2013    Nausea & vomiting     Pulmonary embolism [facility])        Past Surgical History:   Procedure Laterality Date    BACK INJECTION Bilateral 6/4/2021    SACROILIAC JOINT INJECTION BILATERAL performed by Jes Simmons MD at [address] [place] [address]  08/22/2013    laparoscopic    COLONOSCOPY      2012/ Dr. Carolyn Rose Left        Family History   Problem Relation Age of Onset    Stroke Father     Diabetes Sister     Thyroid Disease Brother     Atrial Fibrillation Brother     Atrial Fibrillation Mother         Prior to Visit Medications    Medication Sig Taking?  Authorizing Provider   celecoxib (CELEBREX) 100 MG capsule Take 1 capsule by mouth 2 times daily  Jamila Head, APRN - CNP   rOPINIRole (REQUIP) 0.5 MG tablet Take 1 tablet by mouth 3 times daily  Jamila Head, APRN - CNP   famotidine (PEPCID) 20 MG tablet Take 1 tablet by mouth 2 times daily  Nora Aase, APRN - CNP   pregabalin (LYRICA) 150 MG capsule Take 1 capsule by mouth 2 times daily for 30 days. Sammy Carrera MD   spironolactone (ALDACTONE) 25 MG tablet Take 1 tablet by mouth 2 times daily  Nora Aase, APRN - CNP   flecainide (TAMBOCOR) 50 MG tablet TAKE ONE TABLET BY MOUTH TWICE A DAY  Wayne Morocho MD   amitriptyline (ELAVIL) 100 MG tablet Take 1 tablet by mouth nightly  Nora Aase, APRN - CNP   metoprolol succinate (TOPROL XL) 50 MG extended release tablet Take 1 tablet by mouth daily  Wayne Morocho MD   CARTIA  MG extended release capsule TAKE ONE CAPSULE BY MOUTH DAILY  Steve Galeas DO   furosemide (LASIX) 20 MG tablet TAKE ONE TABLET BY MOUTH DAILY  Wayne Morocho MD   Cholecalciferol (VITAMIN D) 50 MCG (2000 UT) CAPS capsule Take 2,000 Units by mouth daily  Historical Provider, MD   apixaban (ELIQUIS) 5 MG TABS tablet Take 1 tablet by mouth 2 times daily  Nora Aase, APRN - CNP   Elastic Bandages & Supports ([address]) [address] 1 each by Does not apply route daily Knee high 20mmHg  Patient not taking: Reported on 7/29/2021  Nora Aase, APRN - CNP         Review of Systems: All systems reviewed, all unremarkable other than HPI/subjective. No change since last visit. Denies  fever, chills, infection or non healing wound. Physical Exam  HENT:      Head: Atraumatic. Pulmonary:      Effort: Pulmonary effort is normal. No respiratory distress. Musculoskeletal:      Comments: Positive impingement signs bilateral    Neurological:      Mental Status: She is alert and oriented to person, place, and time. Psychiatric:         Behavior: Behavior normal.         Assessment and Plan:      Diagnosis Orders   1.  Rotator cuff arthropathy of both shoulders  MRI SHOULDER LEFT WO CONTRAST    MRI SHOULDER RIGHT WO CONTRAST         MRI of the shoulder bilateral       Call or follow-up if the pain is worse or redness or discharge over the injection site. All questions were answered and imaging studies reviewed with the patient. I have reviewed the chief complaint and HPI including the [facility] and Vital documentation by my staff and I agree with their documentation and have added where applicable. Time spent with patient was 25 minutes. More than 50% was spent counseling/coordinating the patient's care.          Leida Nugent MD   Spine Medicine/PM&R

## 2022-02-11 NOTE — PATIENT INSTRUCTIONS
SURVEY:    You may be receiving a survey from [employer] regarding your visit today. Please complete the survey to enable us to provide the highest quality of care to you and your family. If you cannot score us a very good on any question, please call the office to discuss how we could have made your experience a very good one. Thank you.   [address] [place] [address]  MD Sven Lawrence MD Nilda Nettle, MD Jeanell Buttner, MD Oliva Corrente, MD Rochell Gaul, AuD  [place], [place]  Brandi Ruffin LPN  [facility] [place] [address]  Linda Cazares, KAYLA

## 2022-02-15 RX ORDER — CELECOXIB 100 MG/1
100 CAPSULE ORAL 2 TIMES DAILY
Qty: 180 CAPSULE | Refills: 1 | Status: SHIPPED | OUTPATIENT
Start: 2022-02-15

## 2022-02-15 NOTE — TELEPHONE ENCOUNTER
Last OV: 1/17/2022      Celebrex script pending, please print copy to mail to the patient please as she sends to [place] ([facility] Address below. -Thank you!       Address:  [address]

## 2022-02-21 RX ORDER — DILTIAZEM HYDROCHLORIDE 120 MG/1
120 CAPSULE, COATED, EXTENDED RELEASE ORAL DAILY
Qty: 90 CAPSULE | Refills: 1 | Status: SHIPPED | OUTPATIENT
Start: 2022-02-21 | End: 2022-08-22 | Stop reason: SDUPTHER

## 2022-02-21 NOTE — TELEPHONE ENCOUNTER
Last OV: 1/17/2022 facial swelling 07/30/21 cellulitis of left lower leg  Last RX:    Next scheduled apt: no future appointment        surescript requesting refill

## 2022-02-24 RX ORDER — DILTIAZEM HYDROCHLORIDE 120 MG/1
120 CAPSULE, COATED, EXTENDED RELEASE ORAL DAILY
Qty: 90 CAPSULE | Refills: 1 | OUTPATIENT
Start: 2022-02-24

## 2022-02-28 ENCOUNTER — TELEPHONE (OUTPATIENT)
Dept: FAMILY MEDICINE CLINIC | Age: 62
End: 2022-02-28

## 2022-02-28 NOTE — TELEPHONE ENCOUNTER
Spoke with patient regarding overdue orders for MRI's of both shoulders. She states that she has not decided if she will have them done due to her claustrophobia. She is thinking about it. Orders extended.

## 2022-03-08 RX ORDER — AMITRIPTYLINE HYDROCHLORIDE 100 MG/1
TABLET, FILM COATED ORAL
Qty: 90 TABLET | Refills: 1 | Status: SHIPPED | OUTPATIENT
Start: 2022-03-08 | End: 2022-10-17 | Stop reason: SDUPTHER

## 2022-04-01 DIAGNOSIS — M79.7 FIBROMYALGIA: ICD-10-CM

## 2022-04-05 RX ORDER — PREGABALIN 150 MG/1
150 CAPSULE ORAL 2 TIMES DAILY
Qty: 60 CAPSULE | Refills: 2 | Status: SHIPPED | OUTPATIENT
Start: 2022-04-05 | End: 2022-04-06 | Stop reason: SDUPTHER

## 2022-04-06 DIAGNOSIS — M79.7 FIBROMYALGIA: ICD-10-CM

## 2022-04-11 ENCOUNTER — TELEPHONE (OUTPATIENT)
Dept: FAMILY MEDICINE CLINIC | Age: 62
End: 2022-04-11

## 2022-04-11 NOTE — TELEPHONE ENCOUNTER
----- Message from [facility] sent at 4/9/2022 10:26 AM EDT -----  Subject: Refill Request    QUESTIONS  Name of Medication? pregabalin (LYRICA) 150 MG capsule  Patient-reported dosage and instructions? Take 1 capsule by mouth 2 times   daily for 60 days. How many days do you have left? 0  Preferred Pharmacy? [address] [facility] phone number (if available)? [phone]  ---------------------------------------------------------------------------  --------------  CALL BACK INFO  What is the best way for the office to contact you? OK to leave message on   voicemail  Preferred Call Back Phone Number? [phone]  ---------------------------------------------------------------------------  --------------  SCRIPT ANSWERS  Relationship to Patient?  Self

## 2022-04-13 ENCOUNTER — TELEPHONE (OUTPATIENT)
Dept: PAIN MANAGEMENT | Age: 62
End: 2022-04-13

## 2022-04-13 DIAGNOSIS — M25.512 CHRONIC PAIN OF BOTH SHOULDERS: Primary | ICD-10-CM

## 2022-04-13 DIAGNOSIS — M25.511 CHRONIC PAIN OF BOTH SHOULDERS: Primary | ICD-10-CM

## 2022-04-13 DIAGNOSIS — G89.29 CHRONIC PAIN OF BOTH SHOULDERS: Primary | ICD-10-CM

## 2022-04-13 RX ORDER — PREGABALIN 150 MG/1
150 CAPSULE ORAL 2 TIMES DAILY
Qty: 60 CAPSULE | Refills: 2 | Status: SHIPPED | OUTPATIENT
Start: 2022-04-13 | End: 2022-07-08 | Stop reason: SDUPTHER

## 2022-04-13 NOTE — TELEPHONE ENCOUNTER
Patient states that her insurance denied the MRI's of her shoulders.  She is requesting X-rays of both shoulders

## 2022-05-20 ENCOUNTER — TELEPHONE (OUTPATIENT)
Dept: FAMILY MEDICINE CLINIC | Age: 62
End: 2022-05-20

## 2022-05-23 DIAGNOSIS — R53.82 CHRONIC FATIGUE: ICD-10-CM

## 2022-05-23 DIAGNOSIS — R60.0 BILATERAL LEG EDEMA: Primary | ICD-10-CM

## 2022-05-23 DIAGNOSIS — E55.9 VITAMIN D DEFICIENCY: ICD-10-CM

## 2022-05-23 DIAGNOSIS — R06.02 SHORTNESS OF BREATH: ICD-10-CM

## 2022-05-23 DIAGNOSIS — I27.29 PULMONARY HYPERTENSION ASSOCIATED WITH UNCLEAR MULTI-FACTORIAL MECHANISMS (HCC): ICD-10-CM

## 2022-05-23 DIAGNOSIS — I47.1 PSVT (PAROXYSMAL SUPRAVENTRICULAR TACHYCARDIA) (HCC): ICD-10-CM

## 2022-05-24 ENCOUNTER — TELEPHONE (OUTPATIENT)
Dept: FAMILY MEDICINE CLINIC | Age: 62
End: 2022-05-24

## 2022-05-24 NOTE — TELEPHONE ENCOUNTER
Dry wall is pretty forgiving. As long as she did not get knocked out and is not having any lingering problems, such as recurrent headaches, neck pain, vision disturbance than she is fine to stay at home.  IF she does have any symptoms, she needs to come in and see me

## 2022-05-24 NOTE — TELEPHONE ENCOUNTER
Marifer said she had fallen last night and her head went through the dry wall. She said she didn't pass out and there is no bruising. She did say her left eye is a little red. She is wanting to know if [facility] thought maybe she should get an xray of her head. Please let Marifer know.     Health Maintenance   Topic Date Due    COVID-19 Vaccine (1) Never done    DTaP/Tdap/Td vaccine (1 - Tdap) Never done    Cervical cancer screen  Never done    Shingles vaccine (1 of 2) Never done    Breast cancer screen  05/10/2018    A1C test (Diabetic or Prediabetic)  10/27/2021    Colorectal Cancer Screen  04/05/2022    Flu vaccine (Season Ended) 09/01/2022    Depression Screen  01/17/2023    Lipids  05/27/2026    Hepatitis C screen  Addressed    HIV screen  Addressed    Hepatitis A vaccine  Aged Out    Hepatitis B vaccine  Aged Out    Hib vaccine  Aged Out    Meningococcal (ACWY) vaccine  Aged Out    Pneumococcal 0-64 years Vaccine  Aged Out             (applicable per patient's age: Cancer Screenings, Depression Screening, Fall Risk Screening, Immunizations)    Hemoglobin A1C (%)   Date Value   10/27/2020 6.1   06/17/2019 6.1   01/04/2016 5.4     LDL Cholesterol (mg/dL)   Date Value   05/27/2021 95     LDL Calculated (mg/dL)   Date Value   05/23/2019 131     AST (U/L)   Date Value   05/27/2021 16     ALT (U/L)   Date Value   05/27/2021 17     BUN (mg/dL)   Date Value   11/01/2021 21      (goal A1C is < 7)   (goal LDL is <100) need 30-50% reduction from baseline     BP Readings from Last 3 Encounters:   01/17/22 112/74   07/30/21 120/72   07/29/21 118/87    (goal /80)      All Future Testing planned in [facility]:  Lab Frequency Next Occurrence   XR SHOULDER LEFT (MIN 2 VIEWS) Once 04/29/2022   XR SHOULDER RIGHT (MIN 2 VIEWS) Once 04/29/2022   CBC with Auto Differential Once 08/08/2022   Lipid Panel Once 08/08/2022   Vitamin D 25 Hydroxy Once 08/08/2022   Comprehensive Metabolic Panel Once 60/44/6748   TSH with Reflex Once 08/08/2022   Magnesium Once 08/08/2022   EKG 12 Lead Once 08/08/2022   XR CHEST (2 VW) Once 08/08/2022       Next Visit Date:  Future Appointments   Date Time Provider Kya Quinonesi   8/8/2022  2:30 PM MD Leni Calderon [facility]            Patient Active Problem List:     Fibromyalgia     Follicular adenoma of thyroid gland     S/P laparoscopic cholecystectomy     Right hip pain     Bilateral leg edema     Chronic fatigue     Left thyroid nodule     Chronic back pain greater than 3 months duration     Shortness of breath     Pulmonary hypertension associated with unclear multi-factorial mechanisms ([address]     Morbid obesity due to excess calories (HCC)     MAYURI (obstructive sleep apnea)     Essential hypertension     Coxitis     History of total hip replacement     Gastroesophageal reflux disease without esophagitis     Polyarthritis     Mixed incontinence     Constipation     Acute cystitis without hematuria     RLS (restless legs syndrome)     Cellulitis of leg, right     PSVT (paroxysmal supraventricular tachycardia) (HCC)     Cellulitis of right leg     Venous insufficiency of both lower extremities     Encounter for monitoring amiodarone therapy

## 2022-06-03 RX ORDER — FAMOTIDINE 20 MG/1
20 TABLET, FILM COATED ORAL 2 TIMES DAILY
Qty: 60 TABLET | Refills: 2 | Status: SHIPPED | OUTPATIENT
Start: 2022-06-03 | End: 2022-09-05 | Stop reason: SDUPTHER

## 2022-06-03 NOTE — TELEPHONE ENCOUNTER
Last OV: 1/17/2022 facial swelling    Last RX:    Next scheduled apt: Visit date not found      Pt requesting a refill to postal pharmacy

## 2022-06-15 ENCOUNTER — HOSPITAL ENCOUNTER (OUTPATIENT)
Age: 62
Discharge: HOME | End: 2022-06-15
Payer: COMMERCIAL

## 2022-06-15 LAB
ALBUMIN SERPL-MCNC: 3.4 G/DL (ref 3.5–5.2)
ANION GAP SERPL CALCULATED.3IONS-SCNC: 9 MMOL/L (ref 9–17)
BUN BLDV-MCNC: 16 MG/DL (ref 8–23)
BUN/CREAT BLD: 12 (ref 9–20)
CALCIUM SERPL-MCNC: 8.7 MG/DL (ref 8.6–10.4)
CHLORIDE BLD-SCNC: 101 MMOL/L (ref 98–107)
CO2: 27 MMOL/L (ref 20–31)
CREAT SERPL-MCNC: 1.34 MG/DL (ref 0.5–0.9)
CREATININE URINE: 104.1 MG/DL (ref 28–217)
GFR AFRICAN AMERICAN: 49 ML/MIN
GFR NON-AFRICAN AMERICAN: 40 ML/MIN
GFR SERPL CREATININE-BSD FRML MDRD: ABNORMAL ML/MIN/{1.73_M2}
GLUCOSE BLD-MCNC: 145 MG/DL (ref 70–99)
HCT VFR BLD CALC: 33.7 % (ref 36–46)
HEMOGLOBIN: 11.1 G/DL (ref 12–16)
PHOSPHORUS: 3 MG/DL (ref 2.6–4.5)
POTASSIUM SERPL-SCNC: 4.6 MMOL/L (ref 3.7–5.3)
SODIUM BLD-SCNC: 137 MMOL/L (ref 135–144)
TOTAL PROTEIN, URINE: 11 MG/DL

## 2022-06-15 PROCEDURE — 85018 HEMOGLOBIN: CPT

## 2022-06-15 PROCEDURE — 36415 COLL VENOUS BLD VENIPUNCTURE: CPT

## 2022-06-15 PROCEDURE — 86335 IMMUNFIX E-PHORSIS/URINE/CSF: CPT

## 2022-06-15 PROCEDURE — 84156 ASSAY OF PROTEIN URINE: CPT

## 2022-06-15 PROCEDURE — 83520 IMMUNOASSAY QUANT NOS NONAB: CPT

## 2022-06-15 PROCEDURE — 84155 ASSAY OF PROTEIN SERUM: CPT

## 2022-06-15 PROCEDURE — 85014 HEMATOCRIT: CPT

## 2022-06-15 PROCEDURE — 86334 IMMUNOFIX E-PHORESIS SERUM: CPT

## 2022-06-15 PROCEDURE — 82570 ASSAY OF URINE CREATININE: CPT

## 2022-06-15 PROCEDURE — 84165 PROTEIN E-PHORESIS SERUM: CPT

## 2022-06-15 PROCEDURE — 80069 RENAL FUNCTION PANEL: CPT

## 2022-06-16 LAB
ALBUMIN (CALCULATED): 3.4 G/DL (ref 3.2–5.2)
ALBUMIN PERCENT: 55 % (ref 45–65)
ALPHA 1 PERCENT: 4 % (ref 3–6)
ALPHA 2 PERCENT: 12 % (ref 6–13)
ALPHA-1-GLOBULIN: 0.2 G/DL (ref 0.1–0.4)
ALPHA-2-GLOBULIN: 0.8 G/DL (ref 0.5–0.9)
BETA GLOBULIN: 0.8 G/DL (ref 0.5–1.1)
BETA PERCENT: 13 % (ref 11–19)
GAMMA GLOBULIN %: 16 % (ref 9–20)
GAMMA GLOBULIN: 1 G/DL (ref 0.5–1.5)
PATHOLOGIST: ABNORMAL
PATHOLOGIST: NORMAL
PROTEIN ELECTROPHORESIS, SERUM: ABNORMAL
SERUM IFX INTERP: NORMAL
TOTAL PROT. SUM,%: 100 % (ref 98–102)
TOTAL PROT. SUM: 6.2 G/DL (ref 6.3–8.2)
TOTAL PROTEIN: 6.2 G/DL (ref 6.4–8.3)
URINE IFX INTERP: NORMAL
URINE IFX SPECIMEN: NORMAL
URINE TOTAL PROTEIN: 12 MG/DL
VOLUME: NORMAL ML

## 2022-06-17 LAB
FREE KAPPA LT CHAINS,UR: 72.14 MG/L (ref 0–32.9)
FREE LAMBDA LT CHAINS,UR: 8.62 MG/L (ref 0–3.79)
FREE UR LAMBDA EXCRETION/DAY: ABNORMAL MG/D
HOURS COLLECTED: ABNORMAL
PROTEIN, TOTAL URINE: ABNORMAL MG/D
URINE FREE KAPPA EXCRETION/DAY: ABNORMAL MG/D
URINE VOLUME: ABNORMAL

## 2022-07-05 ENCOUNTER — HOSPITAL ENCOUNTER (OUTPATIENT)
Age: 62
Discharge: HOME | End: 2022-07-05
Payer: COMMERCIAL

## 2022-07-05 DIAGNOSIS — D64.9 ANEMIA, UNSPECIFIED TYPE: ICD-10-CM

## 2022-07-05 DIAGNOSIS — N18.32 STAGE 3B CHRONIC KIDNEY DISEASE (HCC): ICD-10-CM

## 2022-07-05 PROCEDURE — 82728 ASSAY OF FERRITIN: CPT

## 2022-07-05 PROCEDURE — 83550 IRON BINDING TEST: CPT

## 2022-07-05 PROCEDURE — 84466 ASSAY OF TRANSFERRIN: CPT

## 2022-07-05 PROCEDURE — 36415 COLL VENOUS BLD VENIPUNCTURE: CPT

## 2022-07-05 PROCEDURE — 83540 ASSAY OF IRON: CPT

## 2022-07-06 LAB
FERRITIN: 68 NG/ML (ref 13–150)
IRON SATURATION: 19 % (ref 20–55)
IRON: 43 UG/DL (ref 37–145)
TOTAL IRON BINDING CAPACITY: 227 UG/DL (ref 250–450)
TRANSFERRIN: 209 MG/DL (ref 200–360)
UNSATURATED IRON BINDING CAPACITY: 184 UG/DL (ref 112–347)

## 2022-07-07 RX ORDER — SPIRONOLACTONE 25 MG/1
25 TABLET ORAL 2 TIMES DAILY
Qty: 60 TABLET | Refills: 5 | Status: SHIPPED | OUTPATIENT
Start: 2022-07-07

## 2022-07-08 DIAGNOSIS — M79.7 FIBROMYALGIA: ICD-10-CM

## 2022-07-14 RX ORDER — PREGABALIN 150 MG/1
150 CAPSULE ORAL 2 TIMES DAILY
Qty: 60 CAPSULE | Refills: 2 | Status: SHIPPED | OUTPATIENT
Start: 2022-07-14 | End: 2022-09-12

## 2022-08-03 ENCOUNTER — HOSPITAL ENCOUNTER (OUTPATIENT)
Age: 62
Discharge: HOME | End: 2022-08-05
Payer: COMMERCIAL

## 2022-08-03 ENCOUNTER — HOSPITAL ENCOUNTER (OUTPATIENT)
Age: 62
Discharge: HOME | End: 2022-08-03
Payer: COMMERCIAL

## 2022-08-03 ENCOUNTER — HOSPITAL ENCOUNTER (OUTPATIENT)
Dept: GENERAL RADIOLOGY | Age: 62
Discharge: HOME | End: 2022-08-05
Payer: COMMERCIAL

## 2022-08-03 DIAGNOSIS — R06.02 SHORTNESS OF BREATH: ICD-10-CM

## 2022-08-03 DIAGNOSIS — R60.0 BILATERAL LEG EDEMA: ICD-10-CM

## 2022-08-03 DIAGNOSIS — I27.29 PULMONARY HYPERTENSION ASSOCIATED WITH UNCLEAR MULTI-FACTORIAL MECHANISMS (HCC): ICD-10-CM

## 2022-08-03 DIAGNOSIS — E55.9 VITAMIN D DEFICIENCY: ICD-10-CM

## 2022-08-03 DIAGNOSIS — R53.82 CHRONIC FATIGUE: ICD-10-CM

## 2022-08-03 DIAGNOSIS — I47.1 PSVT (PAROXYSMAL SUPRAVENTRICULAR TACHYCARDIA) (HCC): ICD-10-CM

## 2022-08-03 LAB
ABSOLUTE EOS #: 0 K/UL (ref 0–0.4)
ABSOLUTE LYMPH #: 1.7 K/UL (ref 1–4.8)
ABSOLUTE MONO #: 0.5 K/UL (ref 0–1)
ALBUMIN SERPL-MCNC: 3.7 G/DL (ref 3.5–5.2)
ALP BLD-CCNC: 117 U/L (ref 35–104)
ALT SERPL-CCNC: 34 U/L (ref 5–33)
ANION GAP SERPL CALCULATED.3IONS-SCNC: 9 MMOL/L (ref 9–17)
AST SERPL-CCNC: 37 U/L
BASOPHILS # BLD: 0 % (ref 0–2)
BASOPHILS ABSOLUTE: 0 K/UL (ref 0–0.2)
BILIRUB SERPL-MCNC: 0.48 MG/DL (ref 0.3–1.2)
BUN BLDV-MCNC: 15 MG/DL (ref 8–23)
BUN/CREAT BLD: 10 (ref 9–20)
CALCIUM SERPL-MCNC: 9.4 MG/DL (ref 8.6–10.4)
CHLORIDE BLD-SCNC: 103 MMOL/L (ref 98–107)
CHOLESTEROL/HDL RATIO: 3.9
CHOLESTEROL: 170 MG/DL
CO2: 28 MMOL/L (ref 20–31)
CREAT SERPL-MCNC: 1.44 MG/DL (ref 0.5–0.9)
DIFFERENTIAL TYPE: YES
EKG ATRIAL RATE: 73 BPM
EKG P AXIS: 50 DEGREES
EKG P-R INTERVAL: 230 MS
EKG Q-T INTERVAL: 384 MS
EKG QRS DURATION: 106 MS
EKG QTC CALCULATION (BAZETT): 423 MS
EKG R AXIS: 35 DEGREES
EKG T AXIS: 34 DEGREES
EKG VENTRICULAR RATE: 73 BPM
EOSINOPHILS RELATIVE PERCENT: 0 % (ref 0–5)
GFR AFRICAN AMERICAN: 45 ML/MIN
GFR NON-AFRICAN AMERICAN: 37 ML/MIN
GFR SERPL CREATININE-BSD FRML MDRD: ABNORMAL ML/MIN/{1.73_M2}
GLUCOSE BLD-MCNC: 132 MG/DL (ref 70–99)
HCT VFR BLD CALC: 36.3 % (ref 36–46)
HDLC SERPL-MCNC: 44 MG/DL
HEMOGLOBIN: 12 G/DL (ref 12–16)
LDL CHOLESTEROL: 102 MG/DL (ref 0–130)
LYMPHOCYTES # BLD: 28 % (ref 15–40)
MAGNESIUM: 1.9 MG/DL (ref 1.6–2.6)
MCH RBC QN AUTO: 28.6 PG (ref 26–34)
MCHC RBC AUTO-ENTMCNC: 33 G/DL (ref 31–37)
MCV RBC AUTO: 86.7 FL (ref 80–100)
MONOCYTES # BLD: 8 % (ref 4–8)
PATIENT FASTING?: YES
PDW BLD-RTO: 15.9 % (ref 12.1–15.2)
PLATELET # BLD: 271 K/UL (ref 140–450)
POTASSIUM SERPL-SCNC: 4.7 MMOL/L (ref 3.7–5.3)
RBC # BLD: 4.18 M/UL (ref 4–5.2)
SEG NEUTROPHILS: 64 % (ref 47–75)
SEGMENTED NEUTROPHILS ABSOLUTE COUNT: 3.7 K/UL (ref 2.5–7)
SODIUM BLD-SCNC: 140 MMOL/L (ref 135–144)
TOTAL PROTEIN: 6.9 G/DL (ref 6.4–8.3)
TRIGL SERPL-MCNC: 120 MG/DL
TSH SERPL DL<=0.05 MIU/L-ACNC: 2.27 UIU/ML (ref 0.3–5)
WBC # BLD: 5.9 K/UL (ref 3.5–11)

## 2022-08-03 PROCEDURE — 80053 COMPREHEN METABOLIC PANEL: CPT

## 2022-08-03 PROCEDURE — 83735 ASSAY OF MAGNESIUM: CPT

## 2022-08-03 PROCEDURE — 36415 COLL VENOUS BLD VENIPUNCTURE: CPT

## 2022-08-03 PROCEDURE — 93010 ELECTROCARDIOGRAM REPORT: CPT | Performed by: INTERNAL MEDICINE

## 2022-08-03 PROCEDURE — 80061 LIPID PANEL: CPT

## 2022-08-03 PROCEDURE — 84443 ASSAY THYROID STIM HORMONE: CPT

## 2022-08-03 PROCEDURE — 71046 X-RAY EXAM CHEST 2 VIEWS: CPT

## 2022-08-03 PROCEDURE — 93005 ELECTROCARDIOGRAM TRACING: CPT

## 2022-08-03 PROCEDURE — 85025 COMPLETE CBC W/AUTO DIFF WBC: CPT

## 2022-08-03 PROCEDURE — 82306 VITAMIN D 25 HYDROXY: CPT

## 2022-08-04 LAB — VITAMIN D 25-HYDROXY: 34.1 NG/ML

## 2022-08-08 ENCOUNTER — OFFICE VISIT (OUTPATIENT)
Dept: CARDIOLOGY CLINIC | Age: 62
End: 2022-08-08
Payer: COMMERCIAL

## 2022-08-08 VITALS — SYSTOLIC BLOOD PRESSURE: 120 MMHG | OXYGEN SATURATION: 95 % | HEART RATE: 65 BPM | DIASTOLIC BLOOD PRESSURE: 70 MMHG

## 2022-08-08 DIAGNOSIS — I10 ESSENTIAL HYPERTENSION: ICD-10-CM

## 2022-08-08 DIAGNOSIS — R53.82 CHRONIC FATIGUE: ICD-10-CM

## 2022-08-08 DIAGNOSIS — I47.1 PSVT (PAROXYSMAL SUPRAVENTRICULAR TACHYCARDIA) (HCC): ICD-10-CM

## 2022-08-08 DIAGNOSIS — E66.01 MORBID OBESITY DUE TO EXCESS CALORIES (HCC): ICD-10-CM

## 2022-08-08 DIAGNOSIS — G47.33 OSA (OBSTRUCTIVE SLEEP APNEA): ICD-10-CM

## 2022-08-08 DIAGNOSIS — R06.02 SHORTNESS OF BREATH: Primary | ICD-10-CM

## 2022-08-08 PROCEDURE — G8427 DOCREV CUR MEDS BY ELIG CLIN: HCPCS | Performed by: INTERNAL MEDICINE

## 2022-08-08 PROCEDURE — G8417 CALC BMI ABV UP PARAM F/U: HCPCS | Performed by: INTERNAL MEDICINE

## 2022-08-08 PROCEDURE — 1036F TOBACCO NON-USER: CPT | Performed by: INTERNAL MEDICINE

## 2022-08-08 PROCEDURE — 99214 OFFICE O/P EST MOD 30 MIN: CPT | Performed by: INTERNAL MEDICINE

## 2022-08-08 PROCEDURE — 3017F COLORECTAL CA SCREEN DOC REV: CPT | Performed by: INTERNAL MEDICINE

## 2022-08-08 RX ORDER — FOLIC ACID 1 MG/1
1 TABLET ORAL DAILY
COMMUNITY

## 2022-08-08 NOTE — LETTER
Ammon Rowland M.D. [address] [place] [address], [place] [address]  [phone]        2022        Zelalem Dowling, [place] [address], [facility]      RE:   Hudson Bazan  :  1960      Dear Harris [place]:    CHIEF COMPLAINT:  1.  Marked fatigue and loss of energy. 2.  Severe sleep apnea, on a CPAP mask. 3.  SVT. HISTORY OF PRESENT ILLNESS:  I had the pleasure of seeing Mrs. Hudson Bazan in our office on 2022. She is a pleasant 66-year-old female with a long history of hypertension, severe fibromyalgia and arthritis. On 2019, she had palpitations and shortness of breath, and a CT scan showed pulmonary embolism in the distal right pulmonary artery. Echocardiogram showed an EF of 60% to 65% with dilated right atrium and right ventricle. She had SVT at 170 beats per minute, discharged on 2019, placed on Eliquis 5 mg b.i.d. which she has remained. She went to the emergency room again on 2019. An EKG showed SVT at 151 beats per minute, converted to sinus rhythm with her on metoprolol. Another SVT episode on 2020, placed her on flecainide 50 mg b.i.d. She had more edema, and we increased her Aldactone to 25 mg b.i.d. She has remained on flecainide at 50 mg b.i.d. which has controlled her SVT nicely. She does have severe disk disease and does get injections. She has been extremely tired over the last several months. She has difficulty getting up in the morning. She does have some blood in her stool and does have a history of hemorrhoids. She has occasional chest pain, however, it occurs with sitting as well as with activity. She has lost her balance occasionally. She has had no hospitalizations or procedures since her last visit. Denies any PND or orthopnea. Her main complaint today again was marked fatigue. CARDIAC RISK FACTORS:  Hypertension:  Positive.   Other Family Members:  Positive. Peripheral Vascular Disease:  Negative. Smoking:  Negative. Diabetes:  Negative. Hyperlipidemia:  Positive. MEDICATIONS AT THIS TIME:  She is on Elavil 100 mg nightly, Eliquis 5 mg b.i.d., Celebrex 100 mg b.i.d., vitamin D 2000 units daily, Cartia  mg daily, Pepcid 20 mg b.i.d., Tambocor 50 mg b.i.d., folic acid 1 mg daily, Lasix 20 mg daily, Toprol-XL 50 mg daily, Lyrica 150 mg b.i.d., Requip 0.5 mg t.i.d., Aldactone 25 mg b.i.d. PAST MEDICAL AND SURGICAL HISTORY:  1. Left thyroid nodule on 07/25/2013. 2.  Hypertension. 3.  Fibromyalgia. 4.  DVT in the left leg in 2012. 5.  Depression. 6.  Left and right total knee replacements. 7.  Cholecystectomy on 08/22/2013.  8.  Right hip replacement in 2016.  9.  Cellulitis in 02/2021. 10.  Pulmonary emboli, currently on Eliquis 5 mg b.i.d. FAMILY HISTORY:  Mother had atrial fibrillation. Father had CVA. Brother had thyroid disease and atrial fibrillation. SOCIAL HISTORY:  She is 58years old, , no children. Does not smoke or drink alcohol. Does not work outside her home. Sister, Addy Barnes, works in a sleep lab in [place]. She does have sleep apnea and wears a CPAP mask. Does not exercise. REVIEW OF SYSTEMS:  Cardiac as above. Other systems reviewed including constitutional, eyes, ears, nose and throat, cardiovascular, respiratory, GI, , musculoskeletal, integumentary, neurologic, endocrine, hematologic and allergic/immunologic, are negative except for what is described above. No weight loss or weight gain. No change in bowel habits. No blood in stools. No fevers, sweats or chills. PHYSICAL EXAMINATION:  VITAL SIGNS:  Her blood pressure was 127/71 with a heart rate of 65 and regular. Respiratory rate 18. O2 saturation 95%. GENERAL:  She is a very pleasant 59-year-old female. Denied pain. She was oriented to person, place and time. Answered questions appropriately.   SKIN:  No unusual skin changes. HEENT:  The pupils are equally round and intact. Mucous membranes were dry. NECK:  No JVD. Good carotid pulses. No carotid bruits. No lymphadenopathy or thyromegaly. CARDIOVASCULAR EXAM:  S1 and S2 were normal.  No S3 or S4. Soft systolic blowing type murmur. No diastolic murmur. PMI was normal.  No lift, thrust, or pericardial friction rub. LUNGS:  Clear to auscultation and percussion. ABDOMEN:  Soft and nontender. Good bowel sounds. EXTREMITIES:  Good femoral pulses. Good pedal pulses. She had 2+ edema which is her baseline. Skin was warm and dry. No calf tenderness. Nail beds pink. Good cap refill. PULSES:  Bilateral symmetrical radial, brachial and carotid pulses. No carotid bruits. Good femoral and pedal pulses. NEUROLOGIC EXAM:  Within normal limits. PSYCHIATRIC EXAM:  Within normal limits. LABORATORY DATA:  Sodium 140, potassium 4.7, BUN 15, creatinine 1.44, GFR 37. Magnesium 1.9. Calcium was 9.4. Cholesterol 170, HDL was 44, , triglycerides 120. ALT was 34, AST was 37. TSH 2.27. Vitamin D 34.1. White count 5.9, hemoglobin 12.0 with a platelet count of 086,570. EKG showed sinus rhythm with first-degree AV block, otherwise, normal.    Chest x-ray was unremarkable. IMPRESSION:  1.  SVT, well controlled with flecainide 50 mg b.i.d.  2.  History of normal LV function, EF of 60%. 3.  Sleep apnea, on a CPAP mask. 4.  Marked fatigue and loss of energy, starting several months ago. 5.  Hypertension, well controlled. 6.  PE on 11/29/2019, unprovoked with her on Eliquis 5 mg b.i.d. indefinitely. 7.  Mild chronic renal insufficiency, followed by Dr. Elin Hurtado, which has been stable. 8.  Back pain with injections. PLAN:  1. We will do an echocardiogram.  2.  We will do 08:00 a.m. cortisol and ACTH level because of her marked fatigue and loss of energy. 3.  She will have her CPAP machine checked to make sure it is working properly.   4.  We will follow up in 6 months. DISCUSSION:  From a cardiac standpoint, Mrs. Bennie Almazan is doing well. She has had no episodes of SVT and the flecainide is working well. Her QTC interval is within good limits. Her main complaint is that of fatigue which started several months ago. She is again being treated for her sleep apnea. I will have her check her machine to make sure that it is working properly. We will also check a cortisol and ACT level to make sure she does not have an adrenal abnormality. I will check an echocardiogram to look at LV function and also her right-sided chambers and try to get an idea of her pulmonary pressures. Thank you very much for allowing me the privilege of seeing Mrs. Andrea Zuleta. If you have any questions on my thoughts, please do not hesitate to contact me.     Sincerely,        Fern Aase    D: 08/14/2022 19:45:30     T: 08/14/2022 19:49:10     [email or web address]  Job#: [id number]   Doc#: [id number]

## 2022-08-08 NOTE — PROGRESS NOTES
Extremely Tired, Wears C-pap for sleep apnea  More tired than usual started few months ago  Can hardly get up in am  Some blood in stool, has hemrrhoid    Sometimes feels like something on chest just sitting  Same with activity  Had a few falls lost balance    No procedures or hospitalizations since last visit    Follow up in 6months with labs, ekg,   Echo now

## 2022-08-15 DIAGNOSIS — R00.2 PALPITATIONS: ICD-10-CM

## 2022-08-15 DIAGNOSIS — R53.82 CHRONIC FATIGUE: Primary | ICD-10-CM

## 2022-08-17 ENCOUNTER — HOSPITAL ENCOUNTER (OUTPATIENT)
Dept: NON INVASIVE DIAGNOSTICS | Age: 62
Discharge: HOME | End: 2022-08-17
Payer: COMMERCIAL

## 2022-08-17 DIAGNOSIS — E66.01 MORBID OBESITY DUE TO EXCESS CALORIES (HCC): ICD-10-CM

## 2022-08-17 DIAGNOSIS — R06.02 SHORTNESS OF BREATH: ICD-10-CM

## 2022-08-17 DIAGNOSIS — I47.1 PSVT (PAROXYSMAL SUPRAVENTRICULAR TACHYCARDIA) (HCC): ICD-10-CM

## 2022-08-17 DIAGNOSIS — G47.33 OSA (OBSTRUCTIVE SLEEP APNEA): ICD-10-CM

## 2022-08-17 DIAGNOSIS — R53.82 CHRONIC FATIGUE: ICD-10-CM

## 2022-08-17 DIAGNOSIS — I10 ESSENTIAL HYPERTENSION: ICD-10-CM

## 2022-08-17 LAB
LV EF: 60 %
LVEF MODALITY: NORMAL

## 2022-08-17 PROCEDURE — 93306 TTE W/DOPPLER COMPLETE: CPT

## 2022-08-18 ENCOUNTER — TELEPHONE (OUTPATIENT)
Dept: CARDIOLOGY CLINIC | Age: 62
End: 2022-08-18

## 2022-08-22 RX ORDER — DILTIAZEM HYDROCHLORIDE 120 MG/1
120 CAPSULE, COATED, EXTENDED RELEASE ORAL DAILY
Qty: 90 CAPSULE | Refills: 0 | Status: SHIPPED | OUTPATIENT
Start: 2022-08-22

## 2022-08-22 RX ORDER — METOPROLOL SUCCINATE 50 MG/1
50 TABLET, EXTENDED RELEASE ORAL DAILY
Qty: 90 TABLET | Refills: 3 | Status: SHIPPED | OUTPATIENT
Start: 2022-08-22

## 2022-09-05 DIAGNOSIS — I10 ESSENTIAL HYPERTENSION: ICD-10-CM

## 2022-09-06 RX ORDER — ROPINIROLE 0.5 MG/1
0.5 TABLET, FILM COATED ORAL 3 TIMES DAILY
Qty: 270 TABLET | Refills: 1 | Status: SHIPPED | OUTPATIENT
Start: 2022-09-06

## 2022-09-06 RX ORDER — FAMOTIDINE 20 MG/1
20 TABLET, FILM COATED ORAL 2 TIMES DAILY
Qty: 60 TABLET | Refills: 2 | Status: SHIPPED | OUTPATIENT
Start: 2022-09-06

## 2022-09-06 RX ORDER — FUROSEMIDE 20 MG/1
TABLET ORAL
Qty: 90 TABLET | Refills: 10 | Status: SHIPPED | OUTPATIENT
Start: 2022-09-06

## 2022-09-07 ENCOUNTER — HOSPITAL ENCOUNTER (EMERGENCY)
Age: 62
Discharge: HOME | End: 2022-09-07
Attending: FAMILY MEDICINE
Payer: COMMERCIAL

## 2022-09-07 VITALS
WEIGHT: 293 LBS | OXYGEN SATURATION: 99 % | BODY MASS INDEX: 41.95 KG/M2 | SYSTOLIC BLOOD PRESSURE: 151 MMHG | RESPIRATION RATE: 20 BRPM | DIASTOLIC BLOOD PRESSURE: 79 MMHG | HEART RATE: 56 BPM | HEIGHT: 70 IN | TEMPERATURE: 97.5 F

## 2022-09-07 DIAGNOSIS — M54.32 SCIATICA OF LEFT SIDE: ICD-10-CM

## 2022-09-07 DIAGNOSIS — R42 VERTIGO: Primary | ICD-10-CM

## 2022-09-07 LAB
ABSOLUTE EOS #: 0 K/UL (ref 0–0.4)
ABSOLUTE LYMPH #: 1.4 K/UL (ref 1–4.8)
ABSOLUTE MONO #: 0.5 K/UL (ref 0–1)
ALBUMIN SERPL-MCNC: 3.5 G/DL (ref 3.5–5.2)
ALP BLD-CCNC: 117 U/L (ref 35–104)
ALT SERPL-CCNC: 25 U/L (ref 5–33)
ANION GAP SERPL CALCULATED.3IONS-SCNC: 7 MMOL/L (ref 9–17)
AST SERPL-CCNC: 23 U/L
BASOPHILS # BLD: 0 % (ref 0–2)
BASOPHILS ABSOLUTE: 0 K/UL (ref 0–0.2)
BILIRUB SERPL-MCNC: 0.3 MG/DL (ref 0.3–1.2)
BUN BLDV-MCNC: 15 MG/DL (ref 8–23)
BUN/CREAT BLD: 12 (ref 9–20)
CALCIUM SERPL-MCNC: 9.2 MG/DL (ref 8.6–10.4)
CHLORIDE BLD-SCNC: 107 MMOL/L (ref 98–107)
CO2: 27 MMOL/L (ref 20–31)
CREAT SERPL-MCNC: 1.26 MG/DL (ref 0.5–0.9)
DIFFERENTIAL TYPE: YES
EOSINOPHILS RELATIVE PERCENT: 0 % (ref 0–5)
GFR AFRICAN AMERICAN: 52 ML/MIN
GFR NON-AFRICAN AMERICAN: 43 ML/MIN
GFR SERPL CREATININE-BSD FRML MDRD: ABNORMAL ML/MIN/{1.73_M2}
GLUCOSE BLD-MCNC: 131 MG/DL (ref 70–99)
HCT VFR BLD CALC: 34.9 % (ref 36–46)
HEMOGLOBIN: 11.4 G/DL (ref 12–16)
LYMPHOCYTES # BLD: 21 % (ref 15–40)
MCH RBC QN AUTO: 28.7 PG (ref 26–34)
MCHC RBC AUTO-ENTMCNC: 32.8 G/DL (ref 31–37)
MCV RBC AUTO: 87.4 FL (ref 80–100)
MONOCYTES # BLD: 8 % (ref 4–8)
PDW BLD-RTO: 17.9 % (ref 12.1–15.2)
PLATELET # BLD: 234 K/UL (ref 140–450)
POTASSIUM SERPL-SCNC: 4.8 MMOL/L (ref 3.7–5.3)
RBC # BLD: 3.99 M/UL (ref 4–5.2)
SEG NEUTROPHILS: 71 % (ref 47–75)
SEGMENTED NEUTROPHILS ABSOLUTE COUNT: 4.6 K/UL (ref 2.5–7)
SODIUM BLD-SCNC: 141 MMOL/L (ref 135–144)
TOTAL PROTEIN: 6.7 G/DL (ref 6.4–8.3)
WBC # BLD: 6.4 K/UL (ref 3.5–11)

## 2022-09-07 PROCEDURE — 36415 COLL VENOUS BLD VENIPUNCTURE: CPT

## 2022-09-07 PROCEDURE — 96372 THER/PROPH/DIAG INJ SC/IM: CPT

## 2022-09-07 PROCEDURE — 80053 COMPREHEN METABOLIC PANEL: CPT

## 2022-09-07 PROCEDURE — 85025 COMPLETE CBC W/AUTO DIFF WBC: CPT

## 2022-09-07 PROCEDURE — 6360000002 HC RX W HCPCS: Performed by: FAMILY MEDICINE

## 2022-09-07 PROCEDURE — 6370000000 HC RX 637 (ALT 250 FOR IP): Performed by: FAMILY MEDICINE

## 2022-09-07 PROCEDURE — 99284 EMERGENCY DEPT VISIT MOD MDM: CPT

## 2022-09-07 RX ORDER — KETOROLAC TROMETHAMINE 30 MG/ML
60 INJECTION, SOLUTION INTRAMUSCULAR; INTRAVENOUS ONCE
Status: COMPLETED | OUTPATIENT
Start: 2022-09-07 | End: 2022-09-07

## 2022-09-07 RX ORDER — PREDNISONE 20 MG/1
60 TABLET ORAL DAILY
Qty: 12 TABLET | Refills: 0 | Status: SHIPPED | OUTPATIENT
Start: 2022-09-07 | End: 2022-09-11

## 2022-09-07 RX ORDER — MECLIZINE HCL 12.5 MG/1
25 TABLET ORAL ONCE
Status: COMPLETED | OUTPATIENT
Start: 2022-09-07 | End: 2022-09-07

## 2022-09-07 RX ORDER — ORPHENADRINE CITRATE 30 MG/ML
30 INJECTION INTRAMUSCULAR; INTRAVENOUS ONCE
Status: COMPLETED | OUTPATIENT
Start: 2022-09-07 | End: 2022-09-07

## 2022-09-07 RX ORDER — DEXAMETHASONE SODIUM PHOSPHATE 10 MG/ML
10 INJECTION, SOLUTION INTRAMUSCULAR; INTRAVENOUS ONCE
Status: COMPLETED | OUTPATIENT
Start: 2022-09-07 | End: 2022-09-07

## 2022-09-07 RX ORDER — MECLIZINE HYDROCHLORIDE 25 MG/1
25 TABLET ORAL 3 TIMES DAILY PRN
Qty: 30 TABLET | Refills: 0 | Status: SHIPPED | OUTPATIENT
Start: 2022-09-07 | End: 2022-09-17

## 2022-09-07 RX ADMIN — Medication 2 TABLET: at 21:16

## 2022-09-07 RX ADMIN — MECLIZINE 25 MG: 12.5 TABLET ORAL at 21:06

## 2022-09-07 RX ADMIN — ORPHENADRINE CITRATE 30 MG: 30 INJECTION INTRAMUSCULAR; INTRAVENOUS at 21:07

## 2022-09-07 RX ADMIN — MECLIZINE 25 MG: 12.5 TABLET ORAL at 18:54

## 2022-09-07 RX ADMIN — KETOROLAC TROMETHAMINE 60 MG: 30 INJECTION, SOLUTION INTRAMUSCULAR at 21:06

## 2022-09-07 RX ADMIN — DEXAMETHASONE SODIUM PHOSPHATE 10 MG: 10 INJECTION INTRAMUSCULAR; INTRAVENOUS at 21:05

## 2022-09-07 ASSESSMENT — PAIN DESCRIPTION - DESCRIPTORS
DESCRIPTORS: DISCOMFORT
DESCRIPTORS: SHARP;SORE

## 2022-09-07 ASSESSMENT — PAIN - FUNCTIONAL ASSESSMENT
PAIN_FUNCTIONAL_ASSESSMENT: 0-10
PAIN_FUNCTIONAL_ASSESSMENT: PREVENTS OR INTERFERES SOME ACTIVE ACTIVITIES AND ADLS

## 2022-09-07 ASSESSMENT — PAIN DESCRIPTION - PAIN TYPE: TYPE: ACUTE PAIN

## 2022-09-07 ASSESSMENT — LIFESTYLE VARIABLES
HOW MANY STANDARD DRINKS CONTAINING ALCOHOL DO YOU HAVE ON A TYPICAL DAY: PATIENT DOES NOT DRINK
HOW OFTEN DO YOU HAVE A DRINK CONTAINING ALCOHOL: NEVER
HOW OFTEN DO YOU HAVE A DRINK CONTAINING ALCOHOL: NEVER
HOW MANY STANDARD DRINKS CONTAINING ALCOHOL DO YOU HAVE ON A TYPICAL DAY: PATIENT DOES NOT DRINK

## 2022-09-07 ASSESSMENT — PAIN DESCRIPTION - LOCATION
LOCATION: HIP;LEG
LOCATION: HIP

## 2022-09-07 ASSESSMENT — PAIN DESCRIPTION - ORIENTATION: ORIENTATION: LEFT

## 2022-09-07 ASSESSMENT — PAIN DESCRIPTION - FREQUENCY: FREQUENCY: INTERMITTENT

## 2022-09-07 ASSESSMENT — PAIN SCALES - GENERAL: PAINLEVEL_OUTOF10: 5

## 2022-09-08 ENCOUNTER — HOSPITAL ENCOUNTER (OUTPATIENT)
Age: 62
Discharge: HOME | End: 2022-09-10
Payer: COMMERCIAL

## 2022-09-08 ENCOUNTER — HOSPITAL ENCOUNTER (OUTPATIENT)
Dept: GENERAL RADIOLOGY | Age: 62
Discharge: HOME | End: 2022-09-10
Payer: COMMERCIAL

## 2022-09-08 ENCOUNTER — TELEPHONE (OUTPATIENT)
Dept: FAMILY MEDICINE CLINIC | Age: 62
End: 2022-09-08

## 2022-09-08 DIAGNOSIS — M25.552 LEFT HIP PAIN: ICD-10-CM

## 2022-09-08 DIAGNOSIS — M25.552 LEFT HIP PAIN: Primary | ICD-10-CM

## 2022-09-08 PROCEDURE — 73502 X-RAY EXAM HIP UNI 2-3 VIEWS: CPT

## 2022-09-08 NOTE — TELEPHONE ENCOUNTER
Fine for left hip xray.  Ordered
Patient notified, appt scheduled for tomorrow.  Patient wanting whatever miracle drug Dr. Zully Cordero gave her years ago that helped her walk when she was In pain
times daily 2/15/22   [facility] Naomi, APRN - CNP   flecainide (TAMBOCOR) 50 MG tablet TAKE ONE TABLET BY MOUTH TWICE A DAY 9/21/21   Jayda Negron MD   Cholecalciferol (VITAMIN D) 50 MCG (2000 UT) CAPS capsule Take 2,000 Units by mouth daily    Historical Provider, MD   apixaban (ELIQUIS) 5 MG TABS tablet Take 1 tablet by mouth 2 times daily 1/28/21   Sonia Naomi, APRN - CNP   Elastic Bandages & Supports (MEDICAL COMPRESSION STOCKINGS) [address] 1 each by Does not apply route daily Knee high 20mmHg 1/3/19   Sonia Holiday, APRN - CNP

## 2022-09-08 NOTE — ED PROVIDER NOTES
famotidine (PEPCID) 20 MG tablet Take 1 tablet by mouth 2 times daily, Disp-60 tablet, R-2Normal      furosemide (LASIX) 20 MG tablet TAKE ONE TABLET BY MOUTH DAILY, Disp-90 tablet, R-10Normal      dilTIAZem (CARTIA XT) 120 MG extended release capsule Take 1 capsule by mouth daily, Disp-90 capsule, R-0Normal      metoprolol succinate (TOPROL XL) 50 MG extended release tablet Take 1 tablet by mouth daily, Disp-90 tablet, [id number]      folic acid (FOLVITE) 1 MG tablet Take 1 mg by mouth in the morning. Historical Med      pregabalin (LYRICA) 150 MG capsule Take 1 capsule by mouth 2 times daily for 60 days. , Disp-60 capsule, R-2Normal      spironolactone (ALDACTONE) 25 MG tablet Take 1 tablet by mouth 2 times daily, Disp-60 tablet, R-5Normal      amitriptyline (ELAVIL) 100 MG tablet TAKE ONE TABLET BY MOUTH ONCE NIGHTLY, Disp-90 tablet, R-1Normal      celecoxib (CELEBREX) 100 MG capsule Take 1 capsule by mouth 2 times daily, Disp-180 capsule, R-1Normal      flecainide (TAMBOCOR) 50 MG tablet TAKE ONE TABLET BY MOUTH TWICE A DAY, Disp-180 tablet, R-3Normal      Cholecalciferol (VITAMIN D) 50 MCG (2000 UT) CAPS capsule Take 2,000 Units by mouth dailyHistorical Med      apixaban (ELIQUIS) 5 MG TABS tablet Take 1 tablet by mouth 2 times daily, Disp-180 tablet, R-3Print      Elastic Bandages & Supports (MEDICAL COMPRESSION STOCKINGS) MISC DAILY Starting Thu 1/3/2019, Disp-1 each, R-0, PrintKnee high 20mmHg             ALLERGIES     is allergic to ampicillin and sulfa antibiotics. FAMILY HISTORY     She indicated that her mother is alive. She indicated that her father is . She indicated that her sister is alive. She indicated that both of her brothers are alive. She indicated that her maternal grandmother is . She indicated that her maternal grandfather is . She indicated that her paternal grandmother is . She indicated that her paternal grandfather is .    family history includes Atrial Fibrillation in her brother and mother; Diabetes in her sister; Stroke in her father; Thyroid Disease in her brother. SOCIAL HISTORY      reports that she has never smoked. She has never used smokeless tobacco. She reports that she does not drink alcohol and does not use drugs. PHYSICAL EXAM     INITIAL VITALS:  height is 5' 10\" (1.778 m) and weight is 380 lb (172.4 kg) (abnormal). Her oral temperature is 97.5 °F (36.4 °C). Her blood pressure is 151/79 (abnormal) and her pulse is 56. Her respiration is 20 and oxygen saturation is 99%. Physical Exam   Constitutional: Patient is oriented to person, place, and time. Patient appears well-developed and well-nourished. Patient is active and cooperative. HENT:   Head: Normocephalic and atraumatic. Head is without contusion. Right Ear: Hearing and external ear normal. No drainage. Mild effusion without erythema or bulging  Left Ear: Hearing and external ear normal. No drainage. Mild effusion without erythema or bulging  Nose: Nose normal. No nasal deformity. No epistaxis. Mouth/Throat: Mucous membranes are not dry. Eyes: EOMI. Conjunctivae, sclera, and lids are normal. Right eye exhibits no discharge. Left eye exhibits no discharge. Noted nystagmus to lateral fields, right greater than left  Neck: Full passive range of motion without pain and phonation normal.   Cardiovascular:  Normal rate, regular rhythm and intact distal pulses. Pulses: Right radial pulse  2+   Pulmonary/Chest: Effort normal. No tachypnea and no bradypnea. Abdominal: BMI 54.5, soft. Patient without distension or tenderness  Musculoskeletal:   Negative acute trauma or deformity,  apparent full range of motion and normal strength all extremities appropriate to age. Neurological: Patient is alert and oriented to person, place, and time. patient displays no tremor. Patient displays no seizure activity.   Right leg test was positive on the right at 45 degrees, negative on the left, however palpation in the superior midline buttocks area reproduces pain as patient describes. Skin: Skin is warm and dry. Patient is not diaphoretic. Psychiatric: Patient has a normal mood and affect. Patient speech is normal and behavior is normal. Cognition and memory are normal.     DIFFERENTIAL DIAGNOSIS:   Sciatica, low back pain NOS, vertigo    DIAGNOSTIC RESULTS           RADIOLOGY: non-plain film images(s) such as CT, Ultrasound and MRI are read by the radiologist.  No orders to display       LABS:   Labs Reviewed   CBC WITH AUTO DIFFERENTIAL - Abnormal; Notable for the following components:       Result Value    RBC 3.99 (*)     Hemoglobin 11.4 (*)     Hematocrit 34.9 (*)     RDW 17.9 (*)     All other components within normal limits   COMPREHENSIVE METABOLIC PANEL - Abnormal; Notable for the following components:    Glucose 131 (*)     Creatinine 1.26 (*)     Anion Gap 7 (*)     Alkaline Phosphatase 117 (*)     GFR Non- 43 (*)     GFR  52 (*)     All other components within normal limits       EMERGENCY DEPARTMENT COURSE:   Vitals:    Vitals:    09/07/22 1832   BP: (!) 151/79   Pulse: 56   Resp: 20   Temp: 97.5 °F (36.4 °C)   TempSrc: Oral   SpO2: 99%   Weight: (!) 380 lb (172.4 kg)   Height: 5' 10\" (1.778 m)     Patient history and physical exam taken at bedside, discussed patient symptoms and exam findings, discussed initial work-up to include blood work, will give meclizine for symptom control and reevaluate, acknowledged    Lab work-up reviewed    Discussed with patient lab findings, discussed likely sciatica, we discussed stretching exercises can be performed in bed up against a wall, and per medications, will give patient IM ketorolac and IM orphenadrine, or dexamethasone prior to discharge.   Given patient's dizzy sensation, sounds to be more vertigo-like, will give patient prescription meclizine with 1 tablet to take home, steroid burst, close outpatient follow-up with primary care, return to ER any symptoms change worse other concerns, acknowledged    Noted at time of dictation, the discharge instruction showed sciatica on the right side, this was changed to static on the left side post discharge, would not be reflected on the patient's version of AVS    FINAL IMPRESSION      1. Sciatica of right side    2.  Vertigo          DISPOSITION/PLAN   D/c    PATIENT REFERRED TO:  Ashley Sherman, APRN - CNP  [address] [address] [phone]    Call       Sussy Miramontes MD  [address] [place] [address]  [phone]    Call   ENT, As needed    HOSP [facility] ED  [address] [place] [address]  [phone]    As needed, If symptoms worsen      DISCHARGE MEDICATIONS:  Discharge Medication List as of 9/7/2022  8:55 PM        START taking these medications    Details   predniSONE (DELTASONE) 20 MG tablet Take 3 tablets by mouth daily for 4 days, Disp-12 tablet, R-0Normal      meclizine (ANTIVERT) 25 MG tablet Take 1 tablet by mouth 3 times daily as needed for Dizziness, Disp-30 tablet, R-0Normal                 Summation      Patient Course:  d/c    ED Medications administered this visit:    Medications   meclizine (ANTIVERT) tablet 25 mg (25 mg Oral Given 9/7/22 1854)   dexamethasone (PF) (DECADRON) injection 10 mg (10 mg Oral Given 9/7/22 2105)   ketorolac (TORADOL) injection 60 mg (60 mg IntraMUSCular Given 9/7/22 2106)   orphenadrine (NORFLEX) injection 30 mg (30 mg IntraMUSCular Given 9/7/22 2107)   meclizine (ANTIVERT) tablet 25 mg (25 mg Oral Given 9/7/22 2106)       New Prescriptions from this visit:    Discharge Medication List as of 9/7/2022  8:55 PM        START taking these medications    Details   predniSONE (DELTASONE) 20 MG tablet Take 3 tablets by mouth daily for 4 days, Disp-12 tablet, R-0Normal      meclizine (ANTIVERT) 25 MG tablet Take 1 tablet by mouth 3 times daily as needed for Dizziness, Disp-30 tablet, R-0Normal             Follow-up:  Ernie Ibarra, APRN - CNP  [address] [facility] [address] [facility]  [phone]    Call       Aniyah Pelaez MD  [address] [place] [address]  [phone]    Call   ENT, As needed    [facility] ED  [address] [facility] [address] [phone]    As needed, If symptoms worsen        Final Impression:   1. Sciatica of right side    2.  Vertigo               (Please note that portions of this note were completed with a voice recognition program.  Efforts were made to edit the dictations but occasionally words are mis-transcribed.)    MD Conchita Joshi MD  09/08/22 9951 [address], MD  09/08/22 9689

## 2022-09-09 ENCOUNTER — OFFICE VISIT (OUTPATIENT)
Dept: FAMILY MEDICINE CLINIC | Age: 62
End: 2022-09-09
Payer: COMMERCIAL

## 2022-09-09 VITALS
SYSTOLIC BLOOD PRESSURE: 112 MMHG | DIASTOLIC BLOOD PRESSURE: 80 MMHG | OXYGEN SATURATION: 94 % | HEART RATE: 50 BPM | TEMPERATURE: 97.9 F

## 2022-09-09 DIAGNOSIS — E66.01 MORBID OBESITY DUE TO EXCESS CALORIES (HCC): ICD-10-CM

## 2022-09-09 DIAGNOSIS — M16.12 PRIMARY OSTEOARTHRITIS OF LEFT HIP: Primary | ICD-10-CM

## 2022-09-09 PROCEDURE — 99213 OFFICE O/P EST LOW 20 MIN: CPT | Performed by: NURSE PRACTITIONER

## 2022-09-09 SDOH — ECONOMIC STABILITY: FOOD INSECURITY: WITHIN THE PAST 12 MONTHS, YOU WORRIED THAT YOUR FOOD WOULD RUN OUT BEFORE YOU GOT MONEY TO BUY MORE.: NEVER TRUE

## 2022-09-09 SDOH — ECONOMIC STABILITY: FOOD INSECURITY: WITHIN THE PAST 12 MONTHS, THE FOOD YOU BOUGHT JUST DIDN'T LAST AND YOU DIDN'T HAVE MONEY TO GET MORE.: NEVER TRUE

## 2022-09-09 ASSESSMENT — ENCOUNTER SYMPTOMS
NAUSEA: 0
COUGH: 0
VOMITING: 0
SHORTNESS OF BREATH: 0
DIARRHEA: 0

## 2022-09-09 ASSESSMENT — SOCIAL DETERMINANTS OF HEALTH (SDOH): HOW HARD IS IT FOR YOU TO PAY FOR THE VERY BASICS LIKE FOOD, HOUSING, MEDICAL CARE, AND HEATING?: NOT HARD AT ALL

## 2022-09-09 NOTE — PROGRESS NOTES
HPI Notes    Name: Kayleigh Pham  : 1960         Chief Complaint:     Chief Complaint   Patient presents with    Hip Pain     Patient complains of left hip pain. Started 4 days ago. She is having to use the walker at home. She was at ED on 22 and treated for sciatica. Taking prednisone currently       History of Present Illness:        Hip Pain   There was no injury mechanism. The pain is present in the left hip. The quality of the pain is described as aching. The pain is severe. The pain has been Fluctuating since onset. Currently on prednisone 60mg from ED. Past Medical History:     Past Medical History:   Diagnosis Date    Acid reflux     Bleeding tendency (HCC)     Blood type O+     Bronchitis     Chronic back pain     Depression     Diverticulitis     DVT (deep venous thrombosis) ([facility] [address]     Left leg    Fibromyalgia     Hypertension     Internal hemorrhoid     Left thyroid nodule 2013    Nausea & vomiting     Pulmonary embolism (HCC)       Reviewed all health maintenance requirements and ordered appropriate tests  Health Maintenance Due   Topic Date Due    COVID-19 Vaccine (1) Never done    DTaP/Tdap/Td vaccine (1 - Tdap) Never done    Cervical cancer screen  Never done    Shingles vaccine (1 of 2) Never done    Breast cancer screen  05/10/2018    A1C test (Diabetic or Prediabetic)  10/27/2021    Colorectal Cancer Screen  2022    Flu vaccine (1) 2022       Past Surgical History:     Past Surgical History:   Procedure Laterality Date    BACK INJECTION Bilateral 2021    SACROILIAC JOINT INJECTION BILATERAL performed by Anival Puckett MD at [address] [place] [address]  2013    laparoscopic    COLONOSCOPY      / Dr. Susan Peña Left         Medications:       Prior to Admission medications    Medication Sig Start Date End Date Taking?  Authorizing Provider   predniSONE (DELTASONE) 20 MG tablet Take 3 tablets by mouth daily for 4 days 9/7/22 9/11/22 Yes Blake Sherman MD   meclizine (ANTIVERT) 25 MG tablet Take 1 tablet by mouth 3 times daily as needed for Dizziness 9/7/22 9/17/22 Yes Blake Sherman MD   rOPINIRole (REQUIP) 0.5 MG tablet Take 1 tablet by mouth 3 times daily 9/6/22  Yes ASHLEY Barrientos CNP   famotidine (PEPCID) 20 MG tablet Take 1 tablet by mouth 2 times daily 9/6/22  Yes ASHLEY Barrientos CNP   furosemide (LASIX) 20 MG tablet TAKE ONE TABLET BY MOUTH DAILY 9/6/22  Yes Debora Mai MD   dilTIAZem (CARTIA XT) 120 MG extended release capsule Take 1 capsule by mouth daily 8/22/22  Yes ASHLEY Barrientos CNP   metoprolol succinate (TOPROL XL) 50 MG extended release tablet Take 1 tablet by mouth daily 8/22/22  Yes Debora Mai MD   folic acid (FOLVITE) 1 MG tablet Take 1 mg by mouth in the morning. Yes Historical Provider, MD   pregabalin (LYRICA) 150 MG capsule Take 1 capsule by mouth 2 times daily for 60 days.  7/14/22 9/12/22 Yes Lazarus Richters, MD   spironolactone (ALDACTONE) 25 MG tablet Take 1 tablet by mouth 2 times daily 7/7/22  Yes ASHLEY Barrientos CNP   amitriptyline (ELAVIL) 100 MG tablet TAKE ONE TABLET BY MOUTH ONCE NIGHTLY 3/8/22  Yes ASHLEY Barrientos CNP   celecoxib (CELEBREX) 100 MG capsule Take 1 capsule by mouth 2 times daily 2/15/22  Yes ASHLEY Barrientos CNP   flecainide (TAMBOCOR) 50 MG tablet TAKE ONE TABLET BY MOUTH TWICE A DAY 9/21/21  Yes Debora Mai MD   Cholecalciferol (VITAMIN D) 50 MCG (2000 UT) CAPS capsule Take 2,000 Units by mouth daily   Yes Historical Provider, MD   apixaban (ELIQUIS) 5 MG TABS tablet Take 1 tablet by mouth 2 times daily 1/28/21  Yes ASHLEY Barrientos CNP   Elastic Bandages & Supports ([address]) [address] 1 each by Does not apply route daily Knee high 20mmHg 1/3/19  Yes Mary Gonzalez, APRN - CNP        Allergies:       Ampicillin and Sulfa antibiotics    Social History:     Tobacco:    reports that she has never smoked. She has never used smokeless tobacco.  Alcohol:      reports no history of alcohol use. Drug Use:  reports no history of drug use. Family History:     Family History   Problem Relation Age of Onset    Stroke Father     Diabetes Sister     Thyroid Disease Brother     Atrial Fibrillation Brother     Atrial Fibrillation Mother        Review of Systems:         Review of Systems   Constitutional:  Negative for chills and fever. Respiratory:  Negative for cough and shortness of breath. Cardiovascular:  Negative for chest pain and palpitations. Gastrointestinal:  Negative for diarrhea, nausea and vomiting. Musculoskeletal:  Positive for arthralgias and gait problem. Neurological:  Negative for dizziness, seizures and headaches. Physical Exam:     Vitals:  /80   Pulse 50   Temp 97.9 °F (36.6 °C) (Oral)   SpO2 94%       Physical Exam  Vitals and nursing note reviewed. Constitutional:       Appearance: Normal appearance. She is well-developed. Cardiovascular:      Rate and Rhythm: Normal rate and regular rhythm. Heart sounds: Normal heart sounds, S1 normal and S2 normal.   Pulmonary:      Effort: Pulmonary effort is normal. No respiratory distress. Breath sounds: Normal breath sounds. Abdominal:      General: Bowel sounds are normal.      Palpations: Abdomen is soft. Tenderness: There is no abdominal tenderness. Musculoskeletal:      Left hip: Bony tenderness present. Decreased range of motion. Decreased strength. Skin:     General: Skin is warm and dry. Neurological:      Mental Status: She is alert and oriented to person, place, and time.              Data:     Lab Results   Component Value Date/Time     09/07/2022 06:56 PM    K 4.8 09/07/2022 06:56 PM     09/07/2022 06:56 PM    CO2 27 09/07/2022 06:56 PM    BUN 15 09/07/2022 06:56 PM    CREATININE 1.26 09/07/2022 06:56 PM    GLUCOSE 131 09/07/2022 06:56 PM    PROT 6.7 09/07/2022 06:56 PM    LABALBU 3.5 09/07/2022 06:56 PM    BILITOT 0.3 09/07/2022 06:56 PM    ALKPHOS 117 09/07/2022 06:56 PM    AST 23 09/07/2022 06:56 PM    ALT 25 09/07/2022 06:56 PM     Lab Results   Component Value Date/Time    WBC 6.4 09/07/2022 06:56 PM    RBC 3.99 09/07/2022 06:56 PM    RBC 4.17 05/15/2012 05:15 PM    HGB 11.4 09/07/2022 06:56 PM    HCT 34.9 09/07/2022 06:56 PM    MCV 87.4 09/07/2022 06:56 PM    MCH 28.7 09/07/2022 06:56 PM    MCHC 32.8 09/07/2022 06:56 PM    RDW 17.9 09/07/2022 06:56 PM     09/07/2022 06:56 PM     05/15/2012 05:15 PM    MPV NOT REPORTED 01/17/2022 01:43 PM     Lab Results   Component Value Date/Time    TSH 2.27 08/03/2022 11:20 AM     Lab Results   Component Value Date/Time    CHOL 170 08/03/2022 11:20 AM    CHOL 207 05/23/2019 12:00 AM    HDL 44 08/03/2022 11:20 AM    LABA1C 6.1 10/27/2020 12:00 AM          Assessment & Plan        Diagnosis Orders   1. Primary osteoarthritis of left hip  External Referral To Orthopedic Surgery      2. Morbid obesity due to excess calories [facility])  External Referral To Orthopedic Surgery        Patient advised that it is not recommended to take prednisone and Celebrex at the same time. Patient will continue prednisone until its gone and then resume Celebrex. Will send patient to orthopedics for evaluation. Patient verbalizes understanding and agreement with plan. All questions answered. If symptoms do not resolve or worsen, return to office. Completed Refills   Requested Prescriptions      No prescriptions requested or ordered in this encounter     No follow-ups on file. No orders of the defined types were placed in this encounter.     Orders Placed This Encounter   Procedures    External Referral To Orthopedic Surgery     Referral Priority:   Routine     Referral Type:   Eval and Treat     Referral Reason:   Specialty Services Required     Referred to Provider:   Whitney Tyler MD     Requested Specialty:   Orthopedic Surgery     Number of Visits Requested:   1         There are no Patient Instructions on file for this visit.     Electronically signed by ASHLEY Philip CNP on 9/9/2022 at 12:11 PM           Completed Refills   Requested Prescriptions      No prescriptions requested or ordered in this encounter

## 2022-09-12 ENCOUNTER — PATIENT MESSAGE (OUTPATIENT)
Dept: FAMILY MEDICINE CLINIC | Age: 62
End: 2022-09-12

## 2022-09-12 RX ORDER — PREDNISONE 20 MG/1
40 TABLET ORAL DAILY
Qty: 10 TABLET | Refills: 0 | Status: SHIPPED | OUTPATIENT
Start: 2022-09-12 | End: 2022-09-17

## 2022-09-12 NOTE — TELEPHONE ENCOUNTER
From: Mj Bojorquez  To: Terell Cano  Sent: 9/12/2022 1:27 PM EDT  Subject: Prednisone    Any chance you could write me a prescription for prednisone so that I could still go to work?  I know I can't take it forever but I really need it

## 2022-10-06 RX ORDER — FLECAINIDE ACETATE 50 MG/1
50 TABLET ORAL 2 TIMES DAILY
Qty: 180 TABLET | Refills: 3 | Status: SHIPPED | OUTPATIENT
Start: 2022-10-06

## 2022-10-17 DIAGNOSIS — M79.7 FIBROMYALGIA: ICD-10-CM

## 2022-10-17 RX ORDER — PREGABALIN 150 MG/1
150 CAPSULE ORAL 2 TIMES DAILY
Qty: 60 CAPSULE | Refills: 2 | OUTPATIENT
Start: 2022-10-17 | End: 2022-12-16

## 2022-10-17 RX ORDER — AMITRIPTYLINE HYDROCHLORIDE 100 MG/1
100 TABLET, FILM COATED ORAL NIGHTLY PRN
Qty: 90 TABLET | Refills: 1 | Status: SHIPPED | OUTPATIENT
Start: 2022-10-17

## 2022-10-17 NOTE — TELEPHONE ENCOUNTER
Last OV: 9/9/2022  chronic  Last RX:    Next scheduled apt: Visit date not found        Surescript requesting a refill

## 2022-11-15 DIAGNOSIS — M79.7 FIBROMYALGIA: ICD-10-CM

## 2022-11-15 RX ORDER — PREGABALIN 150 MG/1
150 CAPSULE ORAL 2 TIMES DAILY
Qty: 60 CAPSULE | Refills: 2 | Status: SHIPPED | OUTPATIENT
Start: 2022-11-15 | End: 2023-01-14

## 2022-11-15 NOTE — TELEPHONE ENCOUNTER
Last OV: 9/9/2022  Last RX:    Next scheduled apt: Visit date not found              Pt requesting a refill       Ordered by Dr. Cherri Renee

## 2022-11-15 NOTE — TELEPHONE ENCOUNTER
Lyrica 150 mg BID    Mail order to [facility] had gotten this prescription from Dr. Amanda Mullen. She is going to schedule with the new pain doctor but they were booking out until the end of December. She wanted to know if Chapis Mclaughlin would call this in for her.     Health Maintenance   Topic Date Due    COVID-19 Vaccine (1) Never done    DTaP/Tdap/Td vaccine (1 - Tdap) Never done    Cervical cancer screen  Never done    Shingles vaccine (1 of 2) Never done    Breast cancer screen  05/10/2018    A1C test (Diabetic or Prediabetic)  10/27/2021    Colorectal Cancer Screen  04/05/2022    Flu vaccine (1) 08/01/2022    Depression Screen  01/17/2023    Lipids  08/03/2027    Hepatitis C screen  Addressed    HIV screen  Addressed    Hepatitis A vaccine  Aged Out    Hib vaccine  Aged Out    Meningococcal (ACWY) vaccine  Aged Out    Pneumococcal 0-64 years Vaccine  Aged Out             (applicable per patient's age: Cancer Screenings, Depression Screening, Fall Risk Screening, Immunizations)    Hemoglobin A1C (%)   Date Value   10/27/2020 6.1   06/17/2019 6.1   01/04/2016 5.4     LDL Cholesterol (mg/dL)   Date Value   08/03/2022 102     LDL Calculated (mg/dL)   Date Value   05/23/2019 131     AST (U/L)   Date Value   09/07/2022 23     ALT (U/L)   Date Value   09/07/2022 25     BUN (mg/dL)   Date Value   09/07/2022 15      (goal A1C is < 7)   (goal LDL is <100) need 30-50% reduction from baseline     BP Readings from Last 3 Encounters:   09/09/22 112/80   09/07/22 (!) 151/79   08/08/22 120/70    (goal /80)      All Future Testing planned in [facility]:  Lab Frequency Next Occurrence   XR SHOULDER LEFT (MIN 2 VIEWS) Once 04/29/2022   XR SHOULDER RIGHT (MIN 2 VIEWS) Once 04/29/2022   Renal Function Panel Once 12/22/2022   Hemoglobin and Hematocrit Once 12/22/2022   PTH, Intact Once 12/22/2022   Protein / Creatinine Ratio, Urine Once 12/22/2022   CBC with Auto Differential Once 08/08/2022   Comprehensive Metabolic Panel Once 02/08/2023   Lipid Panel Once 02/08/2023   TSH Once 02/08/2023   Magnesium Once 02/08/2023   EKG 12 lead Once 02/08/2023   Cortisol Total Once 08/16/2022   ACTH Once 08/16/2022       Next Visit Date:  Future Appointments   Date Time Provider Kya Trimble   2/6/2023  1:00 PM MD Marilin Schultz W            Patient Active Problem List:     Fibromyalgia     Follicular adenoma of thyroid gland     S/P laparoscopic cholecystectomy     Right hip pain     Bilateral leg edema     Chronic fatigue     Left thyroid nodule     Chronic back pain greater than 3 months duration     Shortness of breath     Pulmonary hypertension associated with unclear multi-factorial mechanisms ([address]     Morbid obesity due to excess calories (HCC)     MAYURI (obstructive sleep apnea)     Essential hypertension     Coxitis     History of total hip replacement     Gastroesophageal reflux disease without esophagitis     Polyarthritis     Mixed incontinence     Constipation     Acute cystitis without hematuria     RLS (restless legs syndrome)     Cellulitis of leg, right     PSVT (paroxysmal supraventricular tachycardia) (HCC)     Cellulitis of right leg     Venous insufficiency of both lower extremities     Encounter for monitoring amiodarone therapy

## 2022-11-18 DIAGNOSIS — I10 ESSENTIAL HYPERTENSION: ICD-10-CM

## 2022-11-18 RX ORDER — DILTIAZEM HYDROCHLORIDE 120 MG/1
120 CAPSULE, COATED, EXTENDED RELEASE ORAL DAILY
Qty: 90 CAPSULE | Refills: 1 | Status: SHIPPED | OUTPATIENT
Start: 2022-11-18

## 2022-11-18 RX ORDER — CELECOXIB 100 MG/1
100 CAPSULE ORAL 2 TIMES DAILY
Qty: 180 CAPSULE | Refills: 1 | Status: SHIPPED | OUTPATIENT
Start: 2022-11-18

## 2022-11-18 NOTE — TELEPHONE ENCOUNTER
Last OV: 9/9/2022   02/15/21 AWV   Last RX:    Next scheduled apt: Visit date not found            Pt requesting a refill        Celebrex set to print pt wants Rx mailed to her so she can get it in [facility])

## 2022-11-21 RX ORDER — FUROSEMIDE 20 MG/1
20 TABLET ORAL DAILY
Qty: 90 TABLET | Refills: 3 | Status: SHIPPED | OUTPATIENT
Start: 2022-11-21

## 2022-11-21 RX ORDER — METOPROLOL SUCCINATE 50 MG/1
50 TABLET, EXTENDED RELEASE ORAL DAILY
Qty: 90 TABLET | Refills: 3 | Status: SHIPPED | OUTPATIENT
Start: 2022-11-21

## 2022-11-28 ENCOUNTER — OFFICE VISIT (OUTPATIENT)
Dept: FAMILY MEDICINE CLINIC | Age: 62
End: 2022-11-28
Payer: COMMERCIAL

## 2022-11-28 VITALS — SYSTOLIC BLOOD PRESSURE: 110 MMHG | DIASTOLIC BLOOD PRESSURE: 82 MMHG | OXYGEN SATURATION: 96 % | HEART RATE: 64 BPM

## 2022-11-28 DIAGNOSIS — H61.23 BILATERAL IMPACTED CERUMEN: Primary | ICD-10-CM

## 2022-11-28 PROCEDURE — 69210 REMOVE IMPACTED EAR WAX UNI: CPT | Performed by: NURSE PRACTITIONER

## 2022-11-28 PROCEDURE — 3074F SYST BP LT 130 MM HG: CPT | Performed by: NURSE PRACTITIONER

## 2022-11-28 PROCEDURE — 3078F DIAST BP <80 MM HG: CPT | Performed by: NURSE PRACTITIONER

## 2022-11-28 PROCEDURE — 99213 OFFICE O/P EST LOW 20 MIN: CPT | Performed by: NURSE PRACTITIONER

## 2022-11-28 ASSESSMENT — ENCOUNTER SYMPTOMS
DIARRHEA: 0
VOMITING: 0
SHORTNESS OF BREATH: 0
NAUSEA: 0
COUGH: 0

## 2022-11-28 NOTE — PROGRESS NOTES
HPI Notes    Name: eDlla Bermudez  : 1960         Chief Complaint:     Chief Complaint   Patient presents with    Ear Fullness     Patient here today with left ear feeling plugged. Started 1 week ago. Trouble hearing out of her ear. History of Present Illness:        Ear Fullness   There is pain in the left ear. This is a new problem. The current episode started 1 to 4 weeks ago. The problem occurs constantly. The problem has been waxing and waning. There has been no fever. The patient is experiencing no pain. Associated symptoms include ear discharge. Pertinent negatives include no coughing, diarrhea, headaches or vomiting. She has tried nothing for the symptoms.      Past Medical History:     Past Medical History:   Diagnosis Date    Acid reflux     Bleeding tendency (HCC)     Blood type O+     Bronchitis     Chronic back pain     Depression     Diverticulitis     DVT (deep venous thrombosis) ([facility] [address]     Left leg    Fibromyalgia     Hypertension     Internal hemorrhoid     Left thyroid nodule 2013    Nausea & vomiting     Pulmonary embolism (HCC)       Reviewed all health maintenance requirements and ordered appropriate tests  Health Maintenance Due   Topic Date Due    COVID-19 Vaccine (1) Never done    DTaP/Tdap/Td vaccine (1 - Tdap) Never done    Cervical cancer screen  Never done    Shingles vaccine (1 of 2) Never done    Breast cancer screen  05/10/2018    A1C test (Diabetic or Prediabetic)  10/27/2021    Colorectal Cancer Screen  2022    Flu vaccine (1) 2022       Past Surgical History:     Past Surgical History:   Procedure Laterality Date    BACK INJECTION Bilateral 2021    SACROILIAC JOINT INJECTION BILATERAL performed by Alla Hickman MD at [address]  2013    laparoscopic    COLONOSCOPY      / Dr. Dennis Crane Left         Medications:       Prior to Admission medications Medication Sig Start Date End Date Taking? Authorizing Provider   metoprolol succinate (TOPROL XL) 50 MG extended release tablet Take 1 tablet by mouth daily 11/21/22  Yes Cari Ha MD   furosemide (LASIX) 20 MG tablet Take 1 tablet by mouth daily 11/21/22  Yes Cari Ha MD   celecoxib (CELEBREX) 100 MG capsule Take 1 capsule by mouth 2 times daily 11/18/22  Yes ASHLEY Florence CNP   dilTIAZem (CARTIA XT) 120 MG extended release capsule Take 1 capsule by mouth daily 11/18/22  Yes ASHLEY Florence CNP   pregabalin (LYRICA) 150 MG capsule Take 1 capsule by mouth 2 times daily for 60 days. 11/15/22 1/14/23 Yes ASHLEY Florence CNP   amitriptyline (ELAVIL) 100 MG tablet Take 1 tablet by mouth nightly as needed for Sleep 10/17/22  Yes ASHLEY Florence CNP   flecainide (TAMBOCOR) 50 MG tablet Take 1 tablet by mouth 2 times daily 10/6/22  Yes Cari Ha MD   rOPINIRole (REQUIP) 0.5 MG tablet Take 1 tablet by mouth 3 times daily 9/6/22  Yes ASHLEY Florence CNP   famotidine (PEPCID) 20 MG tablet Take 1 tablet by mouth 2 times daily 9/6/22  Yes ASHLEY Florence CNP   folic acid (FOLVITE) 1 MG tablet Take 1 mg by mouth in the morning. Yes Historical Provider, MD   spironolactone (ALDACTONE) 25 MG tablet Take 1 tablet by mouth 2 times daily 7/7/22  Yes ASHLEY Florence CNP   Cholecalciferol (VITAMIN D) 50 MCG (2000 UT) CAPS capsule Take 2,000 Units by mouth daily   Yes Historical Provider, MD   apixaban (ELIQUIS) 5 MG TABS tablet Take 1 tablet by mouth 2 times daily 1/28/21  Yes ASHLEY Florence CNP   Elastic Bandages & Supports ([address]) [address] 1 each by Does not apply route daily Knee high 20mmHg 1/3/19  Yes ASHLEY Florence CNP        Allergies:       Ampicillin and Sulfa antibiotics    Social History:     Tobacco:    reports that she has never smoked.  She has never used smokeless tobacco.  Alcohol:      reports no history of alcohol use. Drug Use:  reports no history of drug use. Family History:     Family History   Problem Relation Age of Onset    Stroke Father     Diabetes Sister     Thyroid Disease Brother     Atrial Fibrillation Brother     Atrial Fibrillation Mother        Review of Systems:         Review of Systems   Constitutional:  Negative for chills and fever. HENT:  Positive for ear discharge. Respiratory:  Negative for cough and shortness of breath. Cardiovascular:  Negative for chest pain and palpitations. Gastrointestinal:  Negative for diarrhea, nausea and vomiting. Neurological:  Negative for dizziness, seizures and headaches. Physical Exam:     Vitals:  /82   Pulse 64   SpO2 96%       Physical Exam  Vitals and nursing note reviewed. Constitutional:       Appearance: Normal appearance. She is well-developed. HENT:      Right Ear: There is impacted cerumen. Left Ear: There is impacted cerumen. Cardiovascular:      Rate and Rhythm: Normal rate and regular rhythm. Heart sounds: Normal heart sounds, S1 normal and S2 normal.   Pulmonary:      Effort: Pulmonary effort is normal. No respiratory distress. Breath sounds: Normal breath sounds. Abdominal:      General: Bowel sounds are normal.      Palpations: Abdomen is soft. Tenderness: There is no abdominal tenderness. Skin:     General: Skin is warm and dry. Neurological:      Mental Status: She is alert and oriented to person, place, and time.              Data:     Lab Results   Component Value Date/Time     09/07/2022 06:56 PM    K 4.8 09/07/2022 06:56 PM     09/07/2022 06:56 PM    CO2 27 09/07/2022 06:56 PM    BUN 15 09/07/2022 06:56 PM    CREATININE 1.26 09/07/2022 06:56 PM    GLUCOSE 131 09/07/2022 06:56 PM    PROT 6.7 09/07/2022 06:56 PM    LABALBU 3.5 09/07/2022 06:56 PM    BILITOT 0.3 09/07/2022 06:56 PM    ALKPHOS 117 09/07/2022 06:56 PM    AST 23 09/07/2022 06:56 PM    ALT 25 09/07/2022 06:56 PM     Lab Results   Component Value Date/Time    WBC 6.4 09/07/2022 06:56 PM    RBC 3.99 09/07/2022 06:56 PM    RBC 4.17 05/15/2012 05:15 PM    HGB 11.4 09/07/2022 06:56 PM    HCT 34.9 09/07/2022 06:56 PM    MCV 87.4 09/07/2022 06:56 PM    MCH 28.7 09/07/2022 06:56 PM    MCHC 32.8 09/07/2022 06:56 PM    RDW 17.9 09/07/2022 06:56 PM     09/07/2022 06:56 PM     05/15/2012 05:15 PM    MPV NOT REPORTED 01/17/2022 01:43 PM     Lab Results   Component Value Date/Time    TSH 2.27 08/03/2022 11:20 AM     Lab Results   Component Value Date/Time    CHOL 170 08/03/2022 11:20 AM    CHOL 207 05/23/2019 12:00 AM    HDL 44 08/03/2022 11:20 AM    LABA1C 6.1 10/27/2020 12:00 AM          Assessment & Plan        Diagnosis Orders   1. Bilateral impacted cerumen  LA REMOVAL IMPACTED CERUMEN INSTRUMENTATION UNILAT        Bilateral ears gently flushed with warm water. Illuminated curette used to gently remove cerumen from external ear canal.  Return of moderately hard cerumen. External ear canal now clear. Pt tolerated procedure well. Pt states that hearing is much improved. Patient verbalizes understanding and agreement with plan. All questions answered. If symptoms do not resolve or worsen, return to office. Completed Refills   Requested Prescriptions      No prescriptions requested or ordered in this encounter     No follow-ups on file. No orders of the defined types were placed in this encounter. Orders Placed This Encounter   Procedures    LA REMOVAL IMPACTED CERUMEN INSTRUMENTATION UNILAT         There are no Patient Instructions on file for this visit.     Electronically signed by ASHLEY Cifuentes CNP on 11/28/2022 at 10:30 AM           Completed Refills   Requested Prescriptions      No prescriptions requested or ordered in this encounter

## 2022-12-09 RX ORDER — FAMOTIDINE 20 MG/1
TABLET, FILM COATED ORAL
Qty: 180 TABLET | Refills: 2 | Status: SHIPPED | OUTPATIENT
Start: 2022-12-09

## 2022-12-09 NOTE — TELEPHONE ENCOUNTER
Last OV: 11/28/2022    Next scheduled apt: Visit date not found      Surescripts requesting refill of Famotidine  Medication pending

## 2022-12-15 ENCOUNTER — OFFICE VISIT (OUTPATIENT)
Dept: FAMILY MEDICINE CLINIC | Age: 62
End: 2022-12-15
Payer: COMMERCIAL

## 2022-12-15 VITALS
BODY MASS INDEX: 41.95 KG/M2 | WEIGHT: 293 LBS | HEART RATE: 84 BPM | SYSTOLIC BLOOD PRESSURE: 118 MMHG | HEIGHT: 70 IN | DIASTOLIC BLOOD PRESSURE: 86 MMHG | OXYGEN SATURATION: 95 %

## 2022-12-15 DIAGNOSIS — L03.116 CELLULITIS OF LEFT FOOT: ICD-10-CM

## 2022-12-15 DIAGNOSIS — R22.42 LOCALIZED SWELLING OF LEFT FOOT: Primary | ICD-10-CM

## 2022-12-15 DIAGNOSIS — I89.0 LYMPHEDEMA OF BOTH LOWER EXTREMITIES: ICD-10-CM

## 2022-12-15 PROCEDURE — 3078F DIAST BP <80 MM HG: CPT | Performed by: STUDENT IN AN ORGANIZED HEALTH CARE EDUCATION/TRAINING PROGRAM

## 2022-12-15 PROCEDURE — 99213 OFFICE O/P EST LOW 20 MIN: CPT | Performed by: STUDENT IN AN ORGANIZED HEALTH CARE EDUCATION/TRAINING PROGRAM

## 2022-12-15 PROCEDURE — 3074F SYST BP LT 130 MM HG: CPT | Performed by: STUDENT IN AN ORGANIZED HEALTH CARE EDUCATION/TRAINING PROGRAM

## 2022-12-15 RX ORDER — CEPHALEXIN 500 MG/1
500 CAPSULE ORAL 2 TIMES DAILY
Qty: 20 CAPSULE | Refills: 0 | Status: SHIPPED | OUTPATIENT
Start: 2022-12-15 | End: 2022-12-25

## 2022-12-15 ASSESSMENT — ENCOUNTER SYMPTOMS
NAUSEA: 0
WHEEZING: 0
DIARRHEA: 0
COUGH: 0
COLOR CHANGE: 1
SINUS PAIN: 0
BACK PAIN: 0
RHINORRHEA: 0
ABDOMINAL PAIN: 0
VOMITING: 0

## 2022-12-15 NOTE — PATIENT INSTRUCTIONS
SURVEY:    You may be receiving a survey from [employer] regarding your visit today. Please complete the survey to enable us to provide the highest quality of care to you and your family. If you cannot score us a very good on any question, please call the office to discuss how we could of made your experience a very good one. Thank you.       Clinical Care Team:     Dr. Francesco Rosales, JUANIS      ClericalTeam:     [id number] [facility]

## 2022-12-15 NOTE — PROGRESS NOTES
HPI Notes    Name: Izzy Avalos  : 1960         Chief Complaint:     Chief Complaint   Patient presents with    Edema     Stated there was a crack at the bottom of LT foot and red swelling started Monday        History of Present Illness:        HPI    This is a 70-year-old woman presenting for evaluation of lower extremity edema, concern for cellulitis. She has a longstanding history of bilateral lower extremity edema, and I have actually had treated her for cellulitis in 2021. She finds it very difficult to use compression stockings, but does utilize 2 diuretics, and elevates her legs whenever possible. She first noticed this problem a few days ago. She relates that she actually slept >24 hours two days ago. The left foot is painful, erythematous and there is a fissure in her callus, which she is concerned may be the entry point for possible infection.      Past Medical History:     Past Medical History:   Diagnosis Date    Acid reflux     Bleeding tendency (HCC)     Blood type O+     Bronchitis     Chronic back pain     Depression     Diverticulitis     DVT (deep venous thrombosis) ([facility] [address]     Left leg    Fibromyalgia     Hypertension     Internal hemorrhoid     Left thyroid nodule 2013    Nausea & vomiting     Pulmonary embolism (HCC)       Reviewed all health maintenance requirements and ordered appropriate tests  Health Maintenance Due   Topic Date Due    COVID-19 Vaccine (1) Never done    DTaP/Tdap/Td vaccine (1 - Tdap) Never done    Cervical cancer screen  Never done    Shingles vaccine (1 of 2) Never done    Breast cancer screen  05/10/2018    A1C test (Diabetic or Prediabetic)  10/27/2021    Colorectal Cancer Screen  2022    Flu vaccine (1) 2022       Past Surgical History:     Past Surgical History:   Procedure Laterality Date    BACK INJECTION Bilateral 2021    SACROILIAC JOINT INJECTION BILATERAL performed by Steve Henson MD at [address] 08/22/2013    laparoscopic    COLONOSCOPY      2012/ Dr. Jono Bansal Left         Medications:       Prior to Admission medications    Medication Sig Start Date End Date Taking? Authorizing Provider   cephALEXin (KEFLEX) 500 MG capsule Take 1 capsule by mouth 2 times daily for 10 days 12/15/22 12/25/22 Yes Honorio Phillips DO   famotidine (PEPCID) 20 MG tablet TAKE ONE TABLET BY MOUTH TWICE A DAY 12/9/22  Yes ASHLEY Miles CNP   metoprolol succinate (TOPROL XL) 50 MG extended release tablet Take 1 tablet by mouth daily 11/21/22  Yes Rosanna Leo MD   furosemide (LASIX) 20 MG tablet Take 1 tablet by mouth daily 11/21/22  Yes Rosanna Leo MD   celecoxib (CELEBREX) 100 MG capsule Take 1 capsule by mouth 2 times daily 11/18/22  Yes ASHLEY Miles CNP   dilTIAZem (CARTIA XT) 120 MG extended release capsule Take 1 capsule by mouth daily 11/18/22  Yes ASHLEY Miles CNP   pregabalin (LYRICA) 150 MG capsule Take 1 capsule by mouth 2 times daily for 60 days. 11/15/22 1/14/23 Yes ASHLEY Miles CNP   amitriptyline (ELAVIL) 100 MG tablet Take 1 tablet by mouth nightly as needed for Sleep 10/17/22  Yes ASHLEY Miles CNP   flecainide (TAMBOCOR) 50 MG tablet Take 1 tablet by mouth 2 times daily 10/6/22  Yes Rosanna Leo MD   rOPINIRole (REQUIP) 0.5 MG tablet Take 1 tablet by mouth 3 times daily 9/6/22  Yes ASHLEY Miles CNP   folic acid (FOLVITE) 1 MG tablet Take 1 mg by mouth in the morning.    Yes Historical Provider, MD   spironolactone (ALDACTONE) 25 MG tablet Take 1 tablet by mouth 2 times daily 7/7/22  Yes ASHLEY Miles CNP   Cholecalciferol (VITAMIN D) 50 MCG (2000 UT) CAPS capsule Take 2,000 Units by mouth daily   Yes Historical Provider, MD   apixaban (ELIQUIS) 5 MG TABS tablet Take 1 tablet by mouth 2 times daily 1/28/21  Yes ASHLEY Miles CNP   Elastic Bandages & Supports (MEDICAL COMPRESSION STOCKINGS) MISC 1 each by Does not apply route daily Knee high 20mmHg 1/3/19   Ean Flaherty, APRN - CNP        Allergies:       Ampicillin and Sulfa antibiotics    Social History:     Tobacco:    reports that she has never smoked. She has never used smokeless tobacco.  Alcohol:      reports no history of alcohol use. Drug Use:  reports no history of drug use. Family History:     Family History   Problem Relation Age of Onset    Stroke Father     Diabetes Sister     Thyroid Disease Brother     Atrial Fibrillation Brother     Atrial Fibrillation Mother        Review of Systems:       Review of Systems   Constitutional:  Negative for fever. HENT:  Negative for rhinorrhea, sinus pain and sneezing. Respiratory:  Negative for cough and wheezing. Cardiovascular:  Positive for leg swelling. Negative for chest pain. Gastrointestinal:  Negative for abdominal pain, diarrhea, nausea and vomiting. Musculoskeletal:  Negative for back pain. Skin:  Positive for color change. Negative for rash. Neurological:  Negative for headaches. Psychiatric/Behavioral:  Negative for sleep disturbance. Physical Exam:     Vitals:  /86 (Site: Right Lower Arm, Position: Sitting, Cuff Size: Medium Adult)   Pulse 84   Ht 5' 10\" (1.778 m)   Wt (!) 406 lb (184.2 kg)   SpO2 95%   BMI 58.25 kg/m²       Physical Exam  Vitals and nursing note reviewed. Constitutional:       General: She is not in acute distress. Appearance: Normal appearance. She is obese. Skin:     General: Skin is warm and dry. Capillary Refill: Capillary refill takes less than 2 seconds. Findings: Erythema and rash present. Comments: Left dorsum of foot erythematous with a few vesicular lesions. No obvious fissure in callus, but calluses are present. Neurological:      General: No focal deficit present. Mental Status: She is alert and oriented to person, place, and time. Mental status is at baseline. Cranial Nerves: No cranial nerve deficit. Psychiatric:         Mood and Affect: Mood normal.         Behavior: Behavior normal.         Thought Content: Thought content normal.               Data:     Lab Results   Component Value Date/Time     09/07/2022 06:56 PM    K 4.8 09/07/2022 06:56 PM     09/07/2022 06:56 PM    CO2 27 09/07/2022 06:56 PM    BUN 15 09/07/2022 06:56 PM    CREATININE 1.26 09/07/2022 06:56 PM    GLUCOSE 131 09/07/2022 06:56 PM    PROT 6.7 09/07/2022 06:56 PM    LABALBU 3.5 09/07/2022 06:56 PM    BILITOT 0.3 09/07/2022 06:56 PM    ALKPHOS 117 09/07/2022 06:56 PM    AST 23 09/07/2022 06:56 PM    ALT 25 09/07/2022 06:56 PM     Lab Results   Component Value Date/Time    WBC 6.4 09/07/2022 06:56 PM    RBC 3.99 09/07/2022 06:56 PM    RBC 4.17 05/15/2012 05:15 PM    HGB 11.4 09/07/2022 06:56 PM    HCT 34.9 09/07/2022 06:56 PM    MCV 87.4 09/07/2022 06:56 PM    MCH 28.7 09/07/2022 06:56 PM    MCHC 32.8 09/07/2022 06:56 PM    RDW 17.9 09/07/2022 06:56 PM     09/07/2022 06:56 PM     05/15/2012 05:15 PM    MPV NOT REPORTED 01/17/2022 01:43 PM     Lab Results   Component Value Date/Time    TSH 2.27 08/03/2022 11:20 AM     Lab Results   Component Value Date/Time    CHOL 170 08/03/2022 11:20 AM    CHOL 207 05/23/2019 12:00 AM    HDL 44 08/03/2022 11:20 AM    LABA1C 6.1 10/27/2020 12:00 AM          Assessment & Plan        Diagnosis Orders   1. Localized swelling of left foot  cephALEXin (KEFLEX) 500 MG capsule      2. Lymphedema of both lower extremities  [facility]      3. Cellulitis of left foot            This does appear to be cellulitis. I would recommend 10 day course of Keflex, as this worked in the past. I would also recommend she increase spironolactone to 50 mg PO BID x10 days and follow up with me in the office. Referral placed to lymphedema clinic in [facility]. Follow up in 10-14 days.            Completed Refills Requested Prescriptions     Signed Prescriptions Disp Refills    cephALEXin (KEFLEX) 500 MG capsule 20 capsule 0     Sig: Take 1 capsule by mouth 2 times daily for 10 days     Return if symptoms worsen or fail to improve. Orders Placed This Encounter   Medications    cephALEXin (KEFLEX) 500 MG capsule     Sig: Take 1 capsule by mouth 2 times daily for 10 days     Dispense:  20 capsule     Refill:  0       Orders Placed This Encounter   Procedures    [facility]     Referral Priority:   Routine     Referral Type:   Eval and Treat     Referral Reason:   Specialty Services Required     Number of Visits Requested:   1           Patient Instructions     SURVEY:    You may be receiving a survey from [employer] regarding your visit today. Please complete the survey to enable us to provide the highest quality of care to you and your family. If you cannot score us a very good on any question, please call the office to discuss how we could of made your experience a very good one. Thank you.       Clinical Care Team:     Dr. Clarissa Hernandez, JUANIS      ClericalTeam:     Mateus Resendiz   Electronically signed by Ambra Calderon DO on 12/15/2022 at 9:40 AM           Completed Refills   Requested Prescriptions     Signed Prescriptions Disp Refills    cephALEXin (KEFLEX) 500 MG capsule 20 capsule 0     Sig: Take 1 capsule by mouth 2 times daily for 10 days

## 2022-12-30 ENCOUNTER — OFFICE VISIT (OUTPATIENT)
Dept: FAMILY MEDICINE CLINIC | Age: 62
End: 2022-12-30
Payer: COMMERCIAL

## 2022-12-30 VITALS
BODY MASS INDEX: 41.95 KG/M2 | DIASTOLIC BLOOD PRESSURE: 78 MMHG | OXYGEN SATURATION: 97 % | RESPIRATION RATE: 20 BRPM | WEIGHT: 293 LBS | SYSTOLIC BLOOD PRESSURE: 120 MMHG | HEART RATE: 70 BPM | HEIGHT: 70 IN

## 2022-12-30 DIAGNOSIS — R22.42 LOCALIZED SWELLING OF LEFT FOOT: Primary | ICD-10-CM

## 2022-12-30 DIAGNOSIS — I89.0 LYMPHEDEMA OF BOTH LOWER EXTREMITIES: ICD-10-CM

## 2022-12-30 DIAGNOSIS — L03.116 CELLULITIS OF LEFT FOOT: ICD-10-CM

## 2022-12-30 PROCEDURE — 3074F SYST BP LT 130 MM HG: CPT | Performed by: STUDENT IN AN ORGANIZED HEALTH CARE EDUCATION/TRAINING PROGRAM

## 2022-12-30 PROCEDURE — 99213 OFFICE O/P EST LOW 20 MIN: CPT | Performed by: STUDENT IN AN ORGANIZED HEALTH CARE EDUCATION/TRAINING PROGRAM

## 2022-12-30 PROCEDURE — 3078F DIAST BP <80 MM HG: CPT | Performed by: STUDENT IN AN ORGANIZED HEALTH CARE EDUCATION/TRAINING PROGRAM

## 2022-12-30 ASSESSMENT — ENCOUNTER SYMPTOMS
ABDOMINAL PAIN: 0
SINUS PAIN: 0
NAUSEA: 0
COUGH: 0
RHINORRHEA: 0
WHEEZING: 0
DIARRHEA: 0
BACK PAIN: 0
VOMITING: 0

## 2022-12-30 NOTE — PATIENT INSTRUCTIONS
SURVEY:    You may be receiving a survey from [employer] regarding your visit today. Please complete the survey to enable us to provide the highest quality of care to you and your family. If you cannot score us a very good on any question, please call the office to discuss how we could of made your experience a very good one. Thank you.       Clinical Care Team:     Dr. Raza Guillen, GIGI      ClericalTeam:     Janel Woodruff

## 2022-12-30 NOTE — PROGRESS NOTES
HPI Notes    Name: Izzy Avalos  : 1960         Chief Complaint:     Chief Complaint   Patient presents with    Leg Pain     Follow up leg pain and swellings . . finished the antibiotic        History of Present Illness:      HPI    This is a 61-year-old woman with chronic lower extremities, presenting to follow-up regarding that problem as well as recurrent cellulitis of lower extremity. She has finished Keflex that I had given her. At prior outpatient visit I had also increased her spironolactone to 50 mg p.o. twice daily. She notes improvement in the swelling and resolution of the cellulitis. Unfortunately, she has not established with the lymphedema clinic due to concerns that it would take too long and concerns for transportation.      Past Medical History:     Past Medical History:   Diagnosis Date    Acid reflux     Bleeding tendency (HCC)     Blood type O+     Bronchitis     Chronic back pain     Depression     Diverticulitis     DVT (deep venous thrombosis) ([facility] [address]     Left leg    Fibromyalgia     Hypertension     Internal hemorrhoid     Left thyroid nodule 2013    Nausea & vomiting     Pulmonary embolism (HCC)       Reviewed all health maintenance requirements and ordered appropriate tests  Health Maintenance Due   Topic Date Due    COVID-19 Vaccine (1) Never done    DTaP/Tdap/Td vaccine (1 - Tdap) Never done    Cervical cancer screen  Never done    Shingles vaccine (1 of 2) Never done    Breast cancer screen  05/10/2018    A1C test (Diabetic or Prediabetic)  10/27/2021    Colorectal Cancer Screen  2022    Flu vaccine (1) 2022    Depression Screen  2023     Past Surgical History:     Past Surgical History:   Procedure Laterality Date    BACK INJECTION Bilateral 2021    SACROILIAC JOINT INJECTION BILATERAL performed by tSeve Henson MD at [address]  2013    laparoscopic    COLONOSCOPY      / Dr. Brii De Santiago Right TOTAL KNEE ARTHROPLASTY Left         Medications:       Prior to Admission medications    Medication Sig Start Date End Date Taking? Authorizing Provider   famotidine (PEPCID) 20 MG tablet TAKE ONE TABLET BY MOUTH TWICE A DAY 12/9/22   ASHLEY Sanders CNP   metoprolol succinate (TOPROL XL) 50 MG extended release tablet Take 1 tablet by mouth daily 11/21/22   Tyler Best MD   furosemide (LASIX) 20 MG tablet Take 1 tablet by mouth daily 11/21/22   Tyler Best MD   celecoxib (CELEBREX) 100 MG capsule Take 1 capsule by mouth 2 times daily 11/18/22   ASHLEY Sanders CNP   dilTIAZem (CARTIA XT) 120 MG extended release capsule Take 1 capsule by mouth daily 11/18/22   ASHLEY Sanders CNP   pregabalin (LYRICA) 150 MG capsule Take 1 capsule by mouth 2 times daily for 60 days. 11/15/22 1/14/23  ASHLEY Sanders CNP   amitriptyline (ELAVIL) 100 MG tablet Take 1 tablet by mouth nightly as needed for Sleep 10/17/22   ASHLEY Sanders CNP   flecainide (TAMBOCOR) 50 MG tablet Take 1 tablet by mouth 2 times daily 10/6/22   Tyler Best MD   rOPINIRole (REQUIP) 0.5 MG tablet Take 1 tablet by mouth 3 times daily 9/6/22   ASHLEY Sanders CNP   folic acid (FOLVITE) 1 MG tablet Take 1 mg by mouth in the morning.     Historical Provider, MD   spironolactone (ALDACTONE) 25 MG tablet Take 1 tablet by mouth 2 times daily 7/7/22   ASHLEY Sanders CNP   Cholecalciferol (VITAMIN D) 50 MCG (2000 UT) CAPS capsule Take 2,000 Units by mouth daily    Historical Provider, MD   apixaban (ELIQUIS) 5 MG TABS tablet Take 1 tablet by mouth 2 times daily 1/28/21   ASHLEY Sanders CNP   Elastic Bandages & Supports ([address]) [address] [facility] 1 each by Does not apply route daily Knee high 20mmHg 1/3/19   ASHLEY Sanders CNP        Allergies:       Ampicillin and Sulfa antibiotics    Social History:     Tobacco:    reports that she has never smoked. She has never used smokeless tobacco.  Alcohol:      reports no history of alcohol use. Drug Use:  reports no history of drug use. Family History:     Family History   Problem Relation Age of Onset    Stroke Father     Diabetes Sister     Thyroid Disease Brother     Atrial Fibrillation Brother     Atrial Fibrillation Mother        Review of Systems:       Review of Systems   Constitutional:  Negative for fever. HENT:  Negative for rhinorrhea, sinus pain and sneezing. Respiratory:  Negative for cough and wheezing. Cardiovascular:  Positive for leg swelling. Negative for chest pain. Gastrointestinal:  Negative for abdominal pain, diarrhea, nausea and vomiting. Musculoskeletal:  Negative for back pain. Skin:  Negative for rash. Neurological:  Negative for headaches. Psychiatric/Behavioral:  Negative for sleep disturbance. Physical Exam:     Vitals:  /78 (Site: Right Lower Arm, Position: Sitting, Cuff Size: Medium Adult)   Pulse 70   Resp 20   Ht 5' 10\" (1.778 m)   Wt (!) 406 lb 6.4 oz (184.3 kg)   SpO2 97%   BMI 58.31 kg/m²       Physical Exam  Vitals and nursing note reviewed. Constitutional:       General: She is not in acute distress. Appearance: Normal appearance. Musculoskeletal:      Right lower leg: Edema present. Left lower leg: Edema present. Skin:     General: Skin is warm and dry. Capillary Refill: Capillary refill takes less than 2 seconds. Comments: Trivial dusky erythema of the left dorsum of the foot. Lymphedema still present, relatively unchanged from prior evaluation. Neurological:      General: No focal deficit present. Mental Status: She is alert and oriented to person, place, and time. Mental status is at baseline. Cranial Nerves: No cranial nerve deficit. Psychiatric:         Mood and Affect: Mood normal.         Behavior: Behavior normal.         Thought Content:  Thought content normal.               Data: Lab Results   Component Value Date/Time     09/07/2022 06:56 PM    K 4.8 09/07/2022 06:56 PM     09/07/2022 06:56 PM    CO2 27 09/07/2022 06:56 PM    BUN 15 09/07/2022 06:56 PM    CREATININE 1.26 09/07/2022 06:56 PM    GLUCOSE 131 09/07/2022 06:56 PM    PROT 6.7 09/07/2022 06:56 PM    LABALBU 3.5 09/07/2022 06:56 PM    BILITOT 0.3 09/07/2022 06:56 PM    ALKPHOS 117 09/07/2022 06:56 PM    AST 23 09/07/2022 06:56 PM    ALT 25 09/07/2022 06:56 PM     Lab Results   Component Value Date/Time    WBC 6.4 09/07/2022 06:56 PM    RBC 3.99 09/07/2022 06:56 PM    RBC 4.17 05/15/2012 05:15 PM    HGB 11.4 09/07/2022 06:56 PM    HCT 34.9 09/07/2022 06:56 PM    MCV 87.4 09/07/2022 06:56 PM    MCH 28.7 09/07/2022 06:56 PM    MCHC 32.8 09/07/2022 06:56 PM    RDW 17.9 09/07/2022 06:56 PM     09/07/2022 06:56 PM     05/15/2012 05:15 PM    MPV NOT REPORTED 01/17/2022 01:43 PM     Lab Results   Component Value Date/Time    TSH 2.27 08/03/2022 11:20 AM     Lab Results   Component Value Date/Time    CHOL 170 08/03/2022 11:20 AM    CHOL 207 05/23/2019 12:00 AM    HDL 44 08/03/2022 11:20 AM    LABA1C 6.1 10/27/2020 12:00 AM          Assessment & Plan        Diagnosis Orders   1. Localized swelling of left foot        2. Cellulitis of left foot        3. Lymphedema of both lower extremities            This problem (cellulitis) has resolved. I would still recommend she establish with the lymphedema clinic and have given her the contact number once more. Follow up PRN for this problem. Completed Refills   Requested Prescriptions      No prescriptions requested or ordered in this encounter     No follow-ups on file. No orders of the defined types were placed in this encounter. No orders of the defined types were placed in this encounter. Patient Instructions     SURVEY:    You may be receiving a survey from [employer] regarding your visit today.     Please complete the survey to enable us to provide the highest quality of care to you and your family. If you cannot score us a very good on any question, please call the office to discuss how we could of made your experience a very good one. Thank you.       Clinical Care Team:     Dr. Fazal Madera, GIGI      ClericalTeam:     Karine Oliver   Electronically signed by Valentino Maddox DO on 12/30/2022 at 10:18 AM           Completed Refills   Requested Prescriptions      No prescriptions requested or ordered in this encounter

## 2023-02-03 ENCOUNTER — HOSPITAL ENCOUNTER (OUTPATIENT)
Age: 63
Discharge: HOME | End: 2023-02-03
Payer: COMMERCIAL

## 2023-02-03 DIAGNOSIS — R00.2 PALPITATIONS: ICD-10-CM

## 2023-02-03 DIAGNOSIS — G47.33 OSA (OBSTRUCTIVE SLEEP APNEA): ICD-10-CM

## 2023-02-03 DIAGNOSIS — I10 ESSENTIAL HYPERTENSION: ICD-10-CM

## 2023-02-03 DIAGNOSIS — R53.82 CHRONIC FATIGUE: ICD-10-CM

## 2023-02-03 DIAGNOSIS — I47.1 PSVT (PAROXYSMAL SUPRAVENTRICULAR TACHYCARDIA) (HCC): ICD-10-CM

## 2023-02-03 DIAGNOSIS — R06.02 SHORTNESS OF BREATH: ICD-10-CM

## 2023-02-03 DIAGNOSIS — E66.01 MORBID OBESITY DUE TO EXCESS CALORIES (HCC): ICD-10-CM

## 2023-02-03 LAB
ABSOLUTE EOS #: 0 K/UL (ref 0–0.4)
ABSOLUTE LYMPH #: 1.5 K/UL (ref 1–4.8)
ABSOLUTE MONO #: 0.4 K/UL (ref 0–1)
ALBUMIN SERPL-MCNC: 3.5 G/DL (ref 3.5–5.2)
ALP SERPL-CCNC: 110 U/L (ref 35–104)
ALT SERPL-CCNC: 28 U/L (ref 5–33)
ANION GAP SERPL CALCULATED.3IONS-SCNC: 10 MMOL/L (ref 9–17)
AST SERPL-CCNC: 26 U/L
BASOPHILS # BLD: 1 % (ref 0–2)
BASOPHILS ABSOLUTE: 0 K/UL (ref 0–0.2)
BILIRUB SERPL-MCNC: 0.4 MG/DL (ref 0.3–1.2)
BUN SERPL-MCNC: 13 MG/DL (ref 8–23)
BUN/CREAT BLD: 12 (ref 9–20)
CALCIUM SERPL-MCNC: 8.8 MG/DL (ref 8.6–10.4)
CHLORIDE SERPL-SCNC: 103 MMOL/L (ref 98–107)
CHOLEST SERPL-MCNC: 161 MG/DL
CHOLESTEROL/HDL RATIO: 3.4
CO2 SERPL-SCNC: 26 MMOL/L (ref 20–31)
CORTISOL COLLECTION INFO: NORMAL
CORTISOL: 11.6 UG/DL (ref 2.7–18.4)
CREAT SERPL-MCNC: 1.06 MG/DL (ref 0.5–0.9)
DIFFERENTIAL TYPE: YES
EKG ATRIAL RATE: 67 BPM
EKG P AXIS: 71 DEGREES
EKG P-R INTERVAL: 202 MS
EKG Q-T INTERVAL: 394 MS
EKG QRS DURATION: 90 MS
EKG QTC CALCULATION (BAZETT): 416 MS
EKG R AXIS: 58 DEGREES
EKG T AXIS: 61 DEGREES
EKG VENTRICULAR RATE: 67 BPM
EOSINOPHILS RELATIVE PERCENT: 0 % (ref 0–5)
GFR SERPL CREATININE-BSD FRML MDRD: 59 ML/MIN/1.73M2
GLUCOSE SERPL-MCNC: 140 MG/DL (ref 70–99)
HCT VFR BLD AUTO: 38 % (ref 36–46)
HDLC SERPL-MCNC: 47 MG/DL
HGB BLD-MCNC: 12.7 G/DL (ref 12–16)
LDLC SERPL CALC-MCNC: 85 MG/DL (ref 0–130)
LYMPHOCYTES # BLD: 28 % (ref 15–40)
MAGNESIUM SERPL-MCNC: 1.9 MG/DL (ref 1.6–2.6)
MCH RBC QN AUTO: 30.9 PG (ref 26–34)
MCHC RBC AUTO-ENTMCNC: 33.5 G/DL (ref 31–37)
MCV RBC AUTO: 92.2 FL (ref 80–100)
MONOCYTES # BLD: 7 % (ref 4–8)
PATIENT FASTING?: NO
PDW BLD-RTO: 15.5 % (ref 12.1–15.2)
PLATELET # BLD AUTO: 264 K/UL (ref 140–450)
POTASSIUM SERPL-SCNC: 4.4 MMOL/L (ref 3.7–5.3)
PROT SERPL-MCNC: 6.7 G/DL (ref 6.4–8.3)
RBC # BLD: 4.12 M/UL (ref 4–5.2)
SEG NEUTROPHILS: 64 % (ref 47–75)
SEGMENTED NEUTROPHILS ABSOLUTE COUNT: 3.4 K/UL (ref 2.5–7)
SODIUM SERPL-SCNC: 139 MMOL/L (ref 135–144)
TRIGL SERPL-MCNC: 146 MG/DL
TSH SERPL-ACNC: 2.95 UIU/ML (ref 0.3–5)
WBC # BLD AUTO: 5.3 K/UL (ref 3.5–11)

## 2023-02-03 PROCEDURE — 83735 ASSAY OF MAGNESIUM: CPT

## 2023-02-03 PROCEDURE — 80061 LIPID PANEL: CPT

## 2023-02-03 PROCEDURE — 85025 COMPLETE CBC W/AUTO DIFF WBC: CPT

## 2023-02-03 PROCEDURE — 84443 ASSAY THYROID STIM HORMONE: CPT

## 2023-02-03 PROCEDURE — 80053 COMPREHEN METABOLIC PANEL: CPT

## 2023-02-03 PROCEDURE — 82533 TOTAL CORTISOL: CPT

## 2023-02-03 PROCEDURE — 36415 COLL VENOUS BLD VENIPUNCTURE: CPT

## 2023-02-06 ENCOUNTER — OFFICE VISIT (OUTPATIENT)
Dept: CARDIOLOGY CLINIC | Age: 63
End: 2023-02-06
Payer: COMMERCIAL

## 2023-02-06 VITALS — HEART RATE: 74 BPM | DIASTOLIC BLOOD PRESSURE: 70 MMHG | OXYGEN SATURATION: 95 % | SYSTOLIC BLOOD PRESSURE: 128 MMHG

## 2023-02-06 DIAGNOSIS — I10 ESSENTIAL HYPERTENSION: Primary | ICD-10-CM

## 2023-02-06 DIAGNOSIS — R06.02 SHORTNESS OF BREATH: ICD-10-CM

## 2023-02-06 DIAGNOSIS — I47.1 PSVT (PAROXYSMAL SUPRAVENTRICULAR TACHYCARDIA) (HCC): ICD-10-CM

## 2023-02-06 PROCEDURE — 3078F DIAST BP <80 MM HG: CPT | Performed by: INTERNAL MEDICINE

## 2023-02-06 PROCEDURE — G8417 CALC BMI ABV UP PARAM F/U: HCPCS | Performed by: INTERNAL MEDICINE

## 2023-02-06 PROCEDURE — 99214 OFFICE O/P EST MOD 30 MIN: CPT | Performed by: INTERNAL MEDICINE

## 2023-02-06 PROCEDURE — G8484 FLU IMMUNIZE NO ADMIN: HCPCS | Performed by: INTERNAL MEDICINE

## 2023-02-06 PROCEDURE — 3017F COLORECTAL CA SCREEN DOC REV: CPT | Performed by: INTERNAL MEDICINE

## 2023-02-06 PROCEDURE — 1036F TOBACCO NON-USER: CPT | Performed by: INTERNAL MEDICINE

## 2023-02-06 PROCEDURE — 3074F SYST BP LT 130 MM HG: CPT | Performed by: INTERNAL MEDICINE

## 2023-02-06 PROCEDURE — G8428 CUR MEDS NOT DOCUMENT: HCPCS | Performed by: INTERNAL MEDICINE

## 2023-02-06 NOTE — LETTER
Bert Bettencourt M.D.  [facility]  [facility]  [address] [id number] [phone]        2023        Jimenez Malagon, CNP  [address] [place] [address]    RE:   Marifer Lee  :  1960    Dear Joshua:    CHIEF COMPLAINT:  1.  Severe sleep apnea, not using a CPAP mask.  2.  SVT, controlled with flecainide.    HISTORY OF PRESENT ILLNESS:  I had the pleasure of seeing Marifer in our office on 2023.  She is a pleasant 62-year-old female with a long history of hypertension, severe myalgia and arthritis.    She had shortness of breath on 2019.  A CT scan showed pulmonary embolism in the distal right pulmonary artery.  Echocardiogram showed an EF of 60% to 65% with dilated right atrium and right ventricle.  She did have SVT at 170 beats per minute, discharged on 2019, and placed on Eliquis 5 mg b.i.d., which she has remained.    She went to the emergency room again on 2019.  An EKG showed SVT at 150 beats per minute, which converted to sinus rhythm.  She had another episode of SVT on 2020, and she was placed on flecainide 50 mg b.i.d.  She did have more edema, and was placed on Aldactone.    She does have severe sleep apnea and is unable to use a CPAP mask.    Her edema has improved.  She denies any chest pain.  She is fairly inactive but has had no new shortness of breath.  She has had no cardiac issues over the last 6 months.    CARDIAC RISK FACTORS:  Hypertension:  Positive.  Other Family Members:  Positive.  Peripheral Vascular Disease:  Negative.  Smoking:  Negative.  Diabetes:  Negative.  Hyperlipidemia:  Positive.    MEDICATIONS AT THIS TIME:  She is on Elavil 100 mg as needed for sleep, Eliquis 5 mg b.i.d. (only taking one daily while she is waiting prescription from [facility]), Celebrex 100 mg b.i.d., Cartia  mg daily, Pepcid 20 mg b.i.d., Tambocor 50 mg b.i.d., Lasix 20 mg daily, Toprol-XL 50  mg daily, Lyrica 150 mg b.i.d., Requip 0.3 mg t.i.d., Aldactone 25 mg b.i.d. PAST MEDICAL AND SURGICAL HISTORY:  1. Left thyroid nodule on 07/25/2013. 2.  Hypertension. 3.  Fibromyalgia. 4.  DVT in the left leg in 2012. 5.  Depression. 6.  Left and right total knee replacements. 7.  Cholecystectomy on 08/22/2013.  8.  Right hip replacement in 2016.  9.  Cellulitis in 02/2021. 10.  Pulmonary emboli, on Eliquis 5 mg b.i.d.  11. SVT, controlled with Tambocor. FAMILY HISTORY:  Mother had atrial fibrillation. Father had CVA. Brother had thyroid disease and atrial fibrillation. SOCIAL HISTORY:  She is 58years old, , no children. Does not smoke or drink alcohol. Does not work outside her home. Sister, Melissa Juarez, works in a sleep lab in [place]. She does have sleep apnea and does not wear a CPAP mask. Does not exercise. Her  works in a [address] working 10-hour days, 5 to 6 days a week on shift starting at 4 o'clock in the afternoon until 2 o'clock in the morning. REVIEW OF SYSTEMS:  Cardiac as above. Other systems reviewed including constitutional, eyes, ears, nose and throat, cardiovascular, respiratory, GI, , musculoskeletal, integumentary, neurologic, endocrine, hematologic and allergic/immunologic, are negative except for what is described above. No weight loss or weight gain. No change in bowel habits. No blood in stools. No fevers, sweats or chills. PHYSICAL EXAMINATION:  VITAL SIGNS:  Her blood pressure was 128/70 in both arms, with a heart rate of 74 and regular. Respiratory rate 18. O2 saturation 95%. GENERAL:  She is a very pleasant 80-year-old female. Denied pain. She was oriented to person, place and time. Answered questions appropriately. SKIN:  No unusual skin changes. HEENT:  The pupils are equally round and intact. Mucous membranes were dry. NECK:  No JVD. Good carotid pulses. No carotid bruits. No lymphadenopathy or thyromegaly.   CARDIOVASCULAR EXAM:  S1 and S2 were normal.  No S3 or S4. Soft systolic blowing type murmur. No diastolic murmur. PMI was normal.  No lift, thrust, or pericardial friction rub. LUNGS:  Clear to auscultation and percussion. ABDOMEN:  Soft and nontender. Good bowel sounds. EXTREMITIES:  Good femoral pulses. Good pedal pulses. She had 2 to 3+ edema, which is her baseline. Skin was warm and dry. No calf tenderness. Nail beds pink. Good cap refill. PULSES:  Bilateral symmetrical radial, brachial and carotid pulses. No carotid bruits. Good femoral and pedal pulses. NEUROLOGIC EXAM:  Within normal limits. PSYCHIATRIC EXAM:  Within normal limits. LABORATORY DATA:  Sodium was 130, potassium 4.4, BUN 13, creatinine 1.06, GFR was 59. Magnesium 1.9. Glucose 140. Calcium was 8.8. Cholesterol 161, HDL 47, LDL 85, triglycerides 146. ALT was 28, AST was 26. Cortisol was 11.6. TSH 2.95. White count 5.3, hemoglobin 12.7 with a platelet count of [phone]. EKG showed sinus rhythm with nonspecific ST changes, unchanged from previous EKGs. Echocardiogram on 08/17/2022, showed normal LV function, ejection fraction of 60%. Right atrium and right ventricle were mildly dilated. We could not get a PA pressure, but she had at least mild pulmonary hypertension in view of her dilated right-sided chambers. IMPRESSION:  1.  SVT, well controlled with flecainide 50 mg b.i.d.  2.  Normal LV function, with an EF of 60%. 3.  Severe sleep apnea, not using a CPAP mask. 4.  Hypertension, well controlled. 5.  Unprovoked PE on 11/29/2019, with her on Eliquis 5 mg b.i.d. indefinitely. 6.  History of chronic renal insufficiency, although her creatinine was essentially normal today. 7.  History of back pain with injections. PLAN:  1. No change in medications. 2.  Recommend Kardia device. 3.  We will see in 6 months because of being on flecainide. DISCUSSION:  Wilbert Reyes is about at her baseline. She does have chronic edema. She was offered to go to the lymphedema clinic in [address] [place] [address], which she chose not to do. I made no changes in her medications. I will plan on seeing her in 6 months because of being on flecainide. Thank you very much for allowing me the privilege of seeing Mrs. Dulce Arzola. If you have any questions on my thoughts, please do not hesitate to contact me.     Sincerely,        Hali Lira MD    D: 02/06/2023 13:33:56     T: 02/07/2023 1:51:27     [email or web address]  Job#: [id number]   Doc#: [id number]

## 2023-02-06 NOTE — PATIENT INSTRUCTIONS
No changes    Recommend Tho     E-mail to [email or web address] OR [email or web address]     Follow up in 6 months no

## 2023-02-06 NOTE — PROGRESS NOTES
Ov Dr. Handy Hernández for 6 month f/u   No chest pain   No palpitations   Edema better   No new sob   No new issues   No using cpap   Lives with -  He Works at a [employer]     [address] [facility]     E-mail to [email or web address] OR [email or web address]     Follow up in 6 months

## 2023-02-08 NOTE — PROGRESS NOTES
Davidson Fosetr M.D. [address] [place] [address], [facility]  [phone]        2023        [address] [address], [facility]    RE:   Crow Jerez  :  1960    Dear Negar Coteestic:    CHIEF COMPLAINT:  1. Severe sleep apnea, not using a CPAP mask. 2.  SVT, controlled with flecainide. HISTORY OF PRESENT ILLNESS:  I had the pleasure of seeing Vanessa Casillas in our office on 2023. She is a pleasant 57-year-old female with a long history of hypertension, severe myalgia and arthritis. She had shortness of breath on 2019. A CT scan showed pulmonary embolism in the distal right pulmonary artery. Echocardiogram showed an EF of 60% to 65% with dilated right atrium and right ventricle. She did have SVT at 170 beats per minute, discharged on 2019, and placed on Eliquis 5 mg b.i.d., which she has remained. She went to the emergency room again on 2019. An EKG showed SVT at 150 beats per minute, which converted to sinus rhythm. She had another episode of SVT on 2020, and she was placed on flecainide 50 mg b.i.d. She did have more edema, and was placed on Aldactone. She does have severe sleep apnea and is unable to use a CPAP mask. Her edema has improved. She denies any chest pain. She is fairly inactive but has had no new shortness of breath. She has had no cardiac issues over the last 6 months. CARDIAC RISK FACTORS:  Hypertension:  Positive. Other Family Members:  Positive. Peripheral Vascular Disease:  Negative. Smoking:  Negative. Diabetes:  Negative. Hyperlipidemia:  Positive.     MEDICATIONS AT THIS TIME:  She is on Elavil 100 mg as needed for sleep, Eliquis 5 mg b.i.d. (only taking one daily while she is waiting prescription from [facility] Celebrex 100 mg b.i.d., Cartia  mg daily, Pepcid 20 mg b.i.d., Tambocor 50 mg b.i.d., Lasix 20 mg daily, Toprol-XL 50 mg daily, Lyrica 150 mg b.i.d., Requip 0.3 mg t.i.d., Aldactone 25 mg b.i.d. PAST MEDICAL AND SURGICAL HISTORY:  1. Left thyroid nodule on 07/25/2013. 2.  Hypertension. 3.  Fibromyalgia. 4.  DVT in the left leg in 2012. 5.  Depression. 6.  Left and right total knee replacements. 7.  Cholecystectomy on 08/22/2013.  8.  Right hip replacement in 2016.  9.  Cellulitis in 02/2021. 10.  Pulmonary emboli, on Eliquis 5 mg b.i.d.  11. SVT, controlled with Tambocor. FAMILY HISTORY:  Mother had atrial fibrillation. Father had CVA. Brother had thyroid disease and atrial fibrillation. SOCIAL HISTORY:  She is 58years old, , no children. Does not smoke or drink alcohol. Does not work outside her home. Sister, Gaby Dougherty, works in a sleep lab in [place]. She does have sleep apnea and does not wear a CPAP mask. Does not exercise. Her  works in a [address] working 10-hour days, 5 to 6 days a week on shift starting at 4 o'clock in the afternoon until 2 o'clock in the morning. REVIEW OF SYSTEMS:  Cardiac as above. Other systems reviewed including constitutional, eyes, ears, nose and throat, cardiovascular, respiratory, GI, , musculoskeletal, integumentary, neurologic, endocrine, hematologic and allergic/immunologic, are negative except for what is described above. No weight loss or weight gain. No change in bowel habits. No blood in stools. No fevers, sweats or chills. PHYSICAL EXAMINATION:  VITAL SIGNS:  Her blood pressure was 128/70 in both arms, with a heart rate of 74 and regular. Respiratory rate 18. O2 saturation 95%. GENERAL:  She is a very pleasant 24-year-old female. Denied pain. She was oriented to person, place and time. Answered questions appropriately. SKIN:  No unusual skin changes. HEENT:  The pupils are equally round and intact. Mucous membranes were dry. NECK:  No JVD. Good carotid pulses. No carotid bruits. No lymphadenopathy or thyromegaly.   CARDIOVASCULAR EXAM:  S1 and S2 were normal.  No S3 or S4. Soft systolic blowing type murmur. No diastolic murmur. PMI was normal.  No lift, thrust, or pericardial friction rub. LUNGS:  Clear to auscultation and percussion. ABDOMEN:  Soft and nontender. Good bowel sounds. EXTREMITIES:  Good femoral pulses. Good pedal pulses. She had 2 to 3+ edema, which is her baseline. Skin was warm and dry. No calf tenderness. Nail beds pink. Good cap refill. PULSES:  Bilateral symmetrical radial, brachial and carotid pulses. No carotid bruits. Good femoral and pedal pulses. NEUROLOGIC EXAM:  Within normal limits. PSYCHIATRIC EXAM:  Within normal limits. LABORATORY DATA:  Sodium was 130, potassium 4.4, BUN 13, creatinine 1.06, GFR was 59. Magnesium 1.9. Glucose 140. Calcium was 8.8. Cholesterol 161, HDL 47, LDL 85, triglycerides 146. ALT was 28, AST was 26. Cortisol was 11.6. TSH 2.95. White count 5.3, hemoglobin 12.7 with a platelet count of [phone]. EKG showed sinus rhythm with nonspecific ST changes, unchanged from previous EKGs. Echocardiogram on 08/17/2022, showed normal LV function, ejection fraction of 60%. Right atrium and right ventricle were mildly dilated. We could not get a PA pressure, but she had at least mild pulmonary hypertension in view of her dilated right-sided chambers. IMPRESSION:  1.  SVT, well controlled with flecainide 50 mg b.i.d.  2.  Normal LV function, with an EF of 60%. 3.  Severe sleep apnea, not using a CPAP mask. 4.  Hypertension, well controlled. 5.  Unprovoked PE on 11/29/2019, with her on Eliquis 5 mg b.i.d. indefinitely. 6.  History of chronic renal insufficiency, although her creatinine was essentially normal today. 7.  History of back pain with injections. PLAN:  1. No change in medications. 2.  Recommend Kardia device. 3.  We will see in 6 months because of being on flecainide. DISCUSSION:  Albert Ronquillo is about at her baseline. She does have chronic edema. She was offered to go to the lymphedema clinic in [address] [place] [address], which she chose not to do. I made no changes in her medications. I will plan on seeing her in 6 months because of being on flecainide. Thank you very much for allowing me the privilege of seeing Mrs. Christine Angelucci. If you have any questions on my thoughts, please do not hesitate to contact me.     Sincerely,        Yolanda Hare MD    D: 02/06/2023 13:33:56     T: 02/07/2023 1:51:27     [email or web address]  Job#: [id number]   Doc#: [id number]

## 2023-03-09 DIAGNOSIS — M79.7 FIBROMYALGIA: ICD-10-CM

## 2023-03-09 RX ORDER — PREGABALIN 150 MG/1
CAPSULE ORAL
Qty: 60 CAPSULE | Refills: 2 | Status: SHIPPED | OUTPATIENT
Start: 2023-03-09 | End: 2023-06-07

## 2023-03-09 RX ORDER — ROPINIROLE 0.5 MG/1
TABLET, FILM COATED ORAL
Qty: 270 TABLET | Refills: 1 | Status: SHIPPED | OUTPATIENT
Start: 2023-03-09

## 2023-03-09 NOTE — TELEPHONE ENCOUNTER
Last OV: 12/30/2022 01/21/22 chronic   Last RX:    Next scheduled apt:            Surescript requesting a refill

## 2023-05-22 RX ORDER — DILTIAZEM HYDROCHLORIDE 120 MG/1
120 CAPSULE, COATED, EXTENDED RELEASE ORAL DAILY
Qty: 90 CAPSULE | Refills: 3 | Status: SHIPPED | OUTPATIENT
Start: 2023-05-22

## 2023-05-24 NOTE — TELEPHONE ENCOUNTER
Spoke with patient via phone regarding anticoagulation monitoring.   Last INR on 4/6/2023 was 2.7.  Dose maintained.   Today's INR is 2.6 and is within goal range.    Current warfarin total weekly dose of 42.5 mg verified.  Informed the INR result is within therapeutic range and instructed to maintain current dose per protocol. Discussed dose and return in 8 weeks for next INR. See Anticoagulation flowsheet.    Carolina Thorpe NP is in the office today supervising the treatment.     INR=2.6  Continue current coumadin dose of:7.5 mg every Tues/Thurs/Sat and 5 mg on all remaining days of the week. Recheck INR in 8 weeks.      Instructed to contact the clinic with any unusual bleeding or bruising, any changes in medications, diet, health status, lifestyle, or any other changes, questions or concerns. Verbalized understanding of all discussed.      Joshua's Patient      Last OV: 7/9/2021 leg pain  Last RX:    Next scheduled apt: 7/16/2021        Sure scripts request      Rx pending

## 2023-06-02 RX ORDER — CELECOXIB 100 MG/1
100 CAPSULE ORAL 2 TIMES DAILY
Qty: 180 CAPSULE | Refills: 1 | Status: SHIPPED | OUTPATIENT
Start: 2023-06-02

## 2023-06-02 NOTE — TELEPHONE ENCOUNTER
Last OV: 12/30/2022  chronic   Last RX:    Next scheduled apt: Visit date not found          Pt requesting printed WH'T
Magnesium Once 02/06/2024   XR CHEST (2 VW) Once 02/06/2024   EKG 12 Lead Once 02/06/2024       Next Visit Date:  Future Appointments   Date Time Provider Kya Trimble   8/16/2023 10:30 AM [facility]            Patient Active Problem List:     Fibromyalgia     Follicular adenoma of thyroid gland     S/P laparoscopic cholecystectomy     Right hip pain     Bilateral leg edema     Chronic fatigue     Left thyroid nodule     Chronic back pain greater than 3 months duration     Shortness of breath     Pulmonary hypertension associated with unclear multi-factorial mechanisms ([address]     Morbid obesity due to excess calories (HCC)     MAYURI (obstructive sleep apnea)     Essential hypertension     Coxitis     History of total hip replacement     Gastroesophageal reflux disease without esophagitis     Polyarthritis     Mixed incontinence     Constipation     Acute cystitis without hematuria     RLS (restless legs syndrome)     Cellulitis of leg, right     PSVT (paroxysmal supraventricular tachycardia) (HCC)     Cellulitis of right leg     Venous insufficiency of both lower extremities     Encounter for monitoring amiodarone therapy

## 2023-07-13 RX ORDER — AMITRIPTYLINE HYDROCHLORIDE 100 MG/1
TABLET, FILM COATED ORAL
Qty: 90 TABLET | Refills: 1 | Status: SHIPPED | OUTPATIENT
Start: 2023-07-13

## 2023-07-13 NOTE — TELEPHONE ENCOUNTER
Last OV: 12/30/2022  Last RX:    Next scheduled apt: Visit date not found         Surescript requesting a refill

## 2023-07-14 ENCOUNTER — TELEPHONE (OUTPATIENT)
Dept: FAMILY MEDICINE CLINIC | Age: 63
End: 2023-07-14

## 2023-07-14 NOTE — TELEPHONE ENCOUNTER
[facility] called concerned about pt taking Eliquis and Celebrex together and why. Advised Celebrex was for Fibromyalgia. Will provider take a look at this?  Please advise

## 2023-07-14 NOTE — TELEPHONE ENCOUNTER
Patient is on Eliquis for unprovoked PE back in 2019. She is on Celebrex for fibromyalgia. I am comfortable having her on both of these. I have educated the patient about watching for bleeding. The use of these 2 medications concurrently is not absolutely contraindicated, it just warrants careful monitoring.

## 2023-09-14 DIAGNOSIS — I10 ESSENTIAL HYPERTENSION: ICD-10-CM

## 2023-09-14 NOTE — TELEPHONE ENCOUNTER
----- Message from Charo Romo sent at 9/14/2023  2:56 PM EDT -----  Subject: Refill Request    QUESTIONS  Name of Medication? famotidine (PEPCID) 20 MG tablet  Patient-reported dosage and instructions? 2 tablets daily  How many days do you have left? 14  Preferred Pharmacy? [address] [place] [address] [facility] phone number (if available)? [phone]  ---------------------------------------------------------------------------  --------------,  Name of Medication? rOPINIRole (REQUIP) 0.5 MG tablet  Patient-reported dosage and instructions? 3 tablets daily  How many days do you have left? 16  Preferred Pharmacy? [address] [place] [address] [facility] phone number (if available)? [phone]  ---------------------------------------------------------------------------  --------------,  Name of Medication? furosemide (LASIX) 20 MG tablet  Patient-reported dosage and instructions? 1 tablet daily  How many days do you have left? 14  Preferred Pharmacy? [address] [place] [address] [facility] phone number (if available)? [phone]  Additional Information for Provider? Dr. Sixot Hanley originally prescribed   this, if Uma Nieto is unable to prescribe, please contact pt.   ---------------------------------------------------------------------------  --------------  CALL BACK INFO  What is the best way for the office to contact you? OK to leave message on   voicemail  Preferred Call Back Phone Number? [phone]  ---------------------------------------------------------------------------  --------------  SCRIPT ANSWERS  Relationship to Patient?  Self

## 2023-09-14 NOTE — TELEPHONE ENCOUNTER
Last OV 12/30/22 for swelling, celluliltis  Patient is due for a check up  Attempted to call patient

## 2023-09-15 RX ORDER — ROPINIROLE 0.5 MG/1
0.5 TABLET, FILM COATED ORAL 3 TIMES DAILY
Qty: 270 TABLET | Refills: 0 | Status: SHIPPED | OUTPATIENT
Start: 2023-09-15

## 2023-09-15 RX ORDER — FAMOTIDINE 20 MG/1
20 TABLET, FILM COATED ORAL 2 TIMES DAILY
Qty: 180 TABLET | Refills: 0 | Status: SHIPPED | OUTPATIENT
Start: 2023-09-15

## 2023-09-15 RX ORDER — FUROSEMIDE 20 MG/1
20 TABLET ORAL DAILY
Qty: 90 TABLET | Refills: 0 | Status: SHIPPED | OUTPATIENT
Start: 2023-09-15

## 2023-09-21 DIAGNOSIS — M79.7 FIBROMYALGIA: ICD-10-CM

## 2023-09-21 RX ORDER — PREGABALIN 150 MG/1
150 CAPSULE ORAL 2 TIMES DAILY
Qty: 60 CAPSULE | Refills: 2 | Status: SHIPPED | OUTPATIENT
Start: 2023-09-21 | End: 2023-12-20

## 2023-09-21 NOTE — TELEPHONE ENCOUNTER
Last OV: 12/30/2022  Last RX:    Next scheduled apt: Visit date not found            Pt requesting a refill

## 2023-09-21 NOTE — TELEPHONE ENCOUNTER
----- Message from Eveline Turner sent at 9/21/2023 10:15 AM EDT -----  Subject: Refill Request    QUESTIONS  Name of Medication? pregabalin (LYRICA) 150 MG capsule  Patient-reported dosage and instructions? 1 tablet 2X's daily  How many days do you have left? 0  Preferred Pharmacy? [address] [facility] #[id number]  Pharmacy phone number (if available)? [phone]  ---------------------------------------------------------------------------  --------------  CALL BACK INFO  What is the best way for the office to contact you? OK to leave message on   voicemail  Preferred Call Back Phone Number? [phone]  ---------------------------------------------------------------------------  --------------  SCRIPT ANSWERS  Relationship to Patient?  Self

## 2023-10-11 ENCOUNTER — HOSPITAL ENCOUNTER (OUTPATIENT)
Age: 63
Discharge: HOME | End: 2023-10-11
Payer: COMMERCIAL

## 2023-10-11 ENCOUNTER — OFFICE VISIT (OUTPATIENT)
Dept: FAMILY MEDICINE CLINIC | Age: 63
End: 2023-10-11
Payer: COMMERCIAL

## 2023-10-11 VITALS
DIASTOLIC BLOOD PRESSURE: 70 MMHG | SYSTOLIC BLOOD PRESSURE: 102 MMHG | OXYGEN SATURATION: 96 % | HEART RATE: 74 BPM | TEMPERATURE: 98.2 F

## 2023-10-11 DIAGNOSIS — R73.01 IMPAIRED FASTING GLUCOSE: ICD-10-CM

## 2023-10-11 DIAGNOSIS — I89.0 LYMPHEDEMA OF BOTH LOWER EXTREMITIES: Primary | ICD-10-CM

## 2023-10-11 DIAGNOSIS — G25.81 RESTLESS LEGS: ICD-10-CM

## 2023-10-11 DIAGNOSIS — I89.0 LYMPHEDEMA OF BOTH LOWER EXTREMITIES: ICD-10-CM

## 2023-10-11 LAB
ALBUMIN SERPL-MCNC: 3.2 G/DL (ref 3.5–5.2)
ALP SERPL-CCNC: 110 U/L (ref 35–104)
ALT SERPL-CCNC: 24 U/L (ref 5–33)
ANION GAP SERPL CALCULATED.3IONS-SCNC: 10 MMOL/L (ref 9–17)
AST SERPL-CCNC: 22 U/L
BASOPHILS # BLD: 0.01 K/UL (ref 0–0.2)
BASOPHILS NFR BLD: 0 % (ref 0–2)
BILIRUB SERPL-MCNC: 0.3 MG/DL (ref 0.3–1.2)
BUN SERPL-MCNC: 16 MG/DL (ref 8–23)
BUN/CREAT SERPL: 13 (ref 9–20)
CALCIUM SERPL-MCNC: 8.9 MG/DL (ref 8.6–10.4)
CHLORIDE SERPL-SCNC: 102 MMOL/L (ref 98–107)
CO2 SERPL-SCNC: 25 MMOL/L (ref 20–31)
CREAT SERPL-MCNC: 1.2 MG/DL (ref 0.5–0.9)
EOSINOPHIL # BLD: 0 K/UL (ref 0–0.4)
EOSINOPHILS RELATIVE PERCENT: 0 % (ref 0–5)
ERYTHROCYTE [DISTWIDTH] IN BLOOD BY AUTOMATED COUNT: 14.6 % (ref 12.1–15.2)
GFR SERPL CREATININE-BSD FRML MDRD: 51 ML/MIN/1.73M2
GLUCOSE SERPL-MCNC: 183 MG/DL (ref 70–99)
HCT VFR BLD AUTO: 36.6 % (ref 36–46)
HGB BLD-MCNC: 12.1 G/DL (ref 12–16)
IMM GRANULOCYTES # BLD AUTO: 0.14 K/UL (ref 0–0.3)
IMM GRANULOCYTES NFR BLD: 2 % (ref 0–5)
LYMPHOCYTES NFR BLD: 1.64 K/UL (ref 1–4.8)
LYMPHOCYTES RELATIVE PERCENT: 19 % (ref 15–40)
MCH RBC QN AUTO: 29.7 PG (ref 26–34)
MCHC RBC AUTO-ENTMCNC: 33.1 G/DL (ref 31–37)
MCV RBC AUTO: 89.7 FL (ref 80–100)
MONOCYTES NFR BLD: 0.65 K/UL (ref 0–1)
MONOCYTES NFR BLD: 8 % (ref 4–8)
NEUTROPHILS NFR BLD: 72 % (ref 47–75)
NEUTS SEG NFR BLD: 6.08 K/UL (ref 2.5–7)
PLATELET # BLD AUTO: 237 K/UL (ref 140–450)
PMV BLD AUTO: 9.7 FL (ref 6–12)
POTASSIUM SERPL-SCNC: 4 MMOL/L (ref 3.7–5.3)
PROT SERPL-MCNC: 7.1 G/DL (ref 6.4–8.3)
RBC # BLD AUTO: 4.08 M/UL (ref 4–5.2)
SODIUM SERPL-SCNC: 137 MMOL/L (ref 135–144)
WBC OTHER # BLD: 8.5 K/UL (ref 3.5–11)

## 2023-10-11 PROCEDURE — 3074F SYST BP LT 130 MM HG: CPT | Performed by: NURSE PRACTITIONER

## 2023-10-11 PROCEDURE — 36415 COLL VENOUS BLD VENIPUNCTURE: CPT

## 2023-10-11 PROCEDURE — 3078F DIAST BP <80 MM HG: CPT | Performed by: NURSE PRACTITIONER

## 2023-10-11 PROCEDURE — 80053 COMPREHEN METABOLIC PANEL: CPT

## 2023-10-11 PROCEDURE — 85025 COMPLETE CBC W/AUTO DIFF WBC: CPT

## 2023-10-11 PROCEDURE — 83036 HEMOGLOBIN GLYCOSYLATED A1C: CPT

## 2023-10-11 PROCEDURE — 99213 OFFICE O/P EST LOW 20 MIN: CPT | Performed by: NURSE PRACTITIONER

## 2023-10-11 RX ORDER — ROPINIROLE 1 MG/1
1 TABLET, FILM COATED ORAL 3 TIMES DAILY
Qty: 90 TABLET | Refills: 2 | Status: SHIPPED | OUTPATIENT
Start: 2023-10-11

## 2023-10-11 RX ORDER — CEPHALEXIN 500 MG/1
500 CAPSULE ORAL 2 TIMES DAILY
Qty: 20 CAPSULE | Refills: 0 | COMMUNITY
Start: 2023-10-09 | End: 2023-10-19

## 2023-10-11 RX ORDER — L-METHYLFOLATE-ALGAE-VIT B12-B6 CAP 3-90.314-2-35 MG 3-90.314-2-35 MG
CAP ORAL EVERY 24 HOURS
COMMUNITY

## 2023-10-11 SDOH — ECONOMIC STABILITY: FOOD INSECURITY: WITHIN THE PAST 12 MONTHS, YOU WORRIED THAT YOUR FOOD WOULD RUN OUT BEFORE YOU GOT MONEY TO BUY MORE.: NEVER TRUE

## 2023-10-11 SDOH — ECONOMIC STABILITY: INCOME INSECURITY: HOW HARD IS IT FOR YOU TO PAY FOR THE VERY BASICS LIKE FOOD, HOUSING, MEDICAL CARE, AND HEATING?: NOT HARD AT ALL

## 2023-10-11 SDOH — ECONOMIC STABILITY: HOUSING INSECURITY
IN THE LAST 12 MONTHS, WAS THERE A TIME WHEN YOU DID NOT HAVE A STEADY PLACE TO SLEEP OR SLEPT IN A SHELTER (INCLUDING NOW)?: NO

## 2023-10-11 SDOH — ECONOMIC STABILITY: FOOD INSECURITY: WITHIN THE PAST 12 MONTHS, THE FOOD YOU BOUGHT JUST DIDN'T LAST AND YOU DIDN'T HAVE MONEY TO GET MORE.: NEVER TRUE

## 2023-10-11 ASSESSMENT — ENCOUNTER SYMPTOMS
COUGH: 0
SHORTNESS OF BREATH: 0
NAUSEA: 0
DIARRHEA: 0
VOMITING: 0

## 2023-10-11 ASSESSMENT — PATIENT HEALTH QUESTIONNAIRE - PHQ9
SUM OF ALL RESPONSES TO PHQ QUESTIONS 1-9: 2
SUM OF ALL RESPONSES TO PHQ QUESTIONS 1-9: 2
2. FEELING DOWN, DEPRESSED OR HOPELESS: 1
SUM OF ALL RESPONSES TO PHQ QUESTIONS 1-9: 2
1. LITTLE INTEREST OR PLEASURE IN DOING THINGS: 1
SUM OF ALL RESPONSES TO PHQ QUESTIONS 1-9: 2
SUM OF ALL RESPONSES TO PHQ9 QUESTIONS 1 & 2: 2

## 2023-10-11 NOTE — PROGRESS NOTES
HPI Notes    Name: Kranthi Gross  : 1960         Chief Complaint:     Chief Complaint   Patient presents with    Cellulitis     Patient complains of bilateral leg cellulitis. She is following with . She is on antibiotic     Leg Pain     Patient said the requip is not taking care of the Restless legs. History of Present Illness:        HPI  Pt is a 62 yo female who presents for evaluation of lower leg cellulitis. Pt is being treated by Dr Chris Muhammad for this problem. Being treated with medicated wraps and keflex. Also being treated for RLS with requip. Pt states that her symptoms are still present.      Past Medical History:     Past Medical History:   Diagnosis Date    Acid reflux     Bleeding tendency (HCC)     Blood type O+     Bronchitis     Chronic back pain     Depression     Diverticulitis     DVT (deep venous thrombosis) ([address])     Left leg    Fibromyalgia     Hypertension     Internal hemorrhoid     Left thyroid nodule 2013    Nausea & vomiting     Pulmonary embolism (HCC)       Reviewed all health maintenance requirements and ordered appropriate tests  Health Maintenance Due   Topic Date Due    COVID-19 Vaccine (1) Never done    DTaP/Tdap/Td vaccine (1 - Tdap) Never done    Cervical cancer screen  Never done    Shingles vaccine (1 of 2) Never done    Breast cancer screen  05/10/2018    A1C test (Diabetic or Prediabetic)  10/27/2021    Colorectal Cancer Screen  2022    Depression Screen  2023    Flu vaccine (1) 2023       Past Surgical History:     Past Surgical History:   Procedure Laterality Date    BACK INJECTION Bilateral 2021    SACROILIAC JOINT INJECTION BILATERAL performed by Geovany Guidry MD at [address]  2013    laparoscopic    COLONOSCOPY      / Dr. Kings Bob Left         Medications:       Prior to Admission medications    Medication Sig Start Date End

## 2023-10-11 NOTE — PATIENT INSTRUCTIONS
SURVEY:    You may be receiving a survey from [employer] regarding your visit today. Please complete the survey to enable us to provide the highest quality of care to you and your family. If you cannot score us a very good (5 Stars) on any question, please call the office to discuss how we could have made your experience a very good one. Thank you.     Clinical Care Team: ASHLEY Jonh-MARIAN Zepeda LPN    Clerical Team: 1 Jefry Vuong

## 2023-10-12 ENCOUNTER — TELEPHONE (OUTPATIENT)
Dept: FAMILY MEDICINE CLINIC | Age: 63
End: 2023-10-12

## 2023-10-12 DIAGNOSIS — R73.01 IMPAIRED FASTING GLUCOSE: Primary | ICD-10-CM

## 2023-10-12 LAB
EST. AVERAGE GLUCOSE BLD GHB EST-MCNC: 123 MG/DL
HBA1C MFR BLD: 5.9 % (ref 4–6)

## 2023-10-12 NOTE — TELEPHONE ENCOUNTER
----- Message from ASHLEY Cummings CNP sent at 10/11/2023 12:51 PM EDT -----  Let pt know that her labs were ok. No elevated WBCs. We need to check and A1c on her.

## 2023-10-23 RX ORDER — FLECAINIDE ACETATE 50 MG/1
50 TABLET ORAL 2 TIMES DAILY
Qty: 180 TABLET | Refills: 3 | Status: SHIPPED | OUTPATIENT
Start: 2023-10-23

## 2023-10-23 RX ORDER — FLECAINIDE ACETATE 50 MG/1
50 TABLET ORAL 2 TIMES DAILY
Qty: 14 TABLET | Refills: 0 | Status: SHIPPED | OUTPATIENT
Start: 2023-10-23

## 2023-10-23 NOTE — TELEPHONE ENCOUNTER
Patient asking for 1 week supply to [facility] and rest to mail order. Medications pending two separate encounters .

## 2023-10-23 NOTE — TELEPHONE ENCOUNTER
Pending medication    Health Maintenance   Topic Date Due    COVID-19 Vaccine (1) Never done    DTaP/Tdap/Td vaccine (1 - Tdap) Never done    Cervical cancer screen  Never done    Shingles vaccine (1 of 2) Never done    Breast cancer screen  05/10/2018    Colorectal Cancer Screen  04/05/2022    Flu vaccine (1) 08/01/2023    A1C test (Diabetic or Prediabetic)  10/11/2024    Depression Screen  10/11/2024    GFR test (Diabetes, CKD 3-4, OR last GFR 15-59)  10/11/2024    Lipids  02/03/2028    Hepatitis C screen  Addressed    HIV screen  Addressed    Hepatitis A vaccine  Aged Out    Hepatitis B vaccine  Aged Out    Hib vaccine  Aged Out    Meningococcal (ACWY) vaccine  Aged Out    Pneumococcal 0-64 years Vaccine  Aged Out             (applicable per patient's age: Cancer Screenings, Depression Screening, Fall Risk Screening, Immunizations)    Hemoglobin A1C (%)   Date Value   10/11/2023 5.9   10/27/2020 6.1   06/17/2019 6.1     LDL Cholesterol (mg/dL)   Date Value   02/03/2023 85     LDL Calculated (mg/dL)   Date Value   05/23/2019 131     AST (U/L)   Date Value   10/11/2023 22     ALT (U/L)   Date Value   10/11/2023 24     BUN (mg/dL)   Date Value   10/11/2023 16      (goal A1C is < 7)   (goal LDL is <100) need 30-50% reduction from baseline     BP Readings from Last 3 Encounters:   10/11/23 102/70   02/06/23 128/70   12/30/22 120/78    (goal /80)      All Future Testing planned in [facility]:  Lab Frequency Next Occurrence   CBC with Auto Differential Once 02/06/2024   Comprehensive Metabolic Panel Once 72/43/6016   Lipid Panel Once 02/06/2024   TSH with Reflex Once 02/06/2024   Magnesium Once 02/06/2024   XR CHEST (2 VW) Once 02/06/2024   EKG 12 Lead Once 02/06/2024       Next Visit Date:  Future Appointments   Date Time Provider [address]   11/27/2023 10:00 AM MD Juan Oropeza [facility]            Patient Active Problem List:     Fibromyalgia     Follicular adenoma of thyroid gland     S/P

## 2023-10-27 RX ORDER — AMITRIPTYLINE HYDROCHLORIDE 100 MG/1
100 TABLET ORAL NIGHTLY PRN
Qty: 90 TABLET | Refills: 1 | Status: SHIPPED | OUTPATIENT
Start: 2023-10-27

## 2023-11-02 RX ORDER — SPIRONOLACTONE 25 MG/1
25 TABLET ORAL 2 TIMES DAILY
Qty: 180 TABLET | Refills: 1 | Status: SHIPPED | OUTPATIENT
Start: 2023-11-02

## 2023-11-02 RX ORDER — METOPROLOL SUCCINATE 50 MG/1
50 TABLET, EXTENDED RELEASE ORAL DAILY
Qty: 90 TABLET | Refills: 3 | Status: SHIPPED | OUTPATIENT
Start: 2023-11-02

## 2023-11-20 ENCOUNTER — HOSPITAL ENCOUNTER (OUTPATIENT)
Age: 63
Discharge: HOME | End: 2023-11-22
Payer: COMMERCIAL

## 2023-11-20 ENCOUNTER — HOSPITAL ENCOUNTER (OUTPATIENT)
Age: 63
Discharge: HOME | End: 2023-11-20
Payer: COMMERCIAL

## 2023-11-20 ENCOUNTER — HOSPITAL ENCOUNTER (OUTPATIENT)
Dept: GENERAL RADIOLOGY | Age: 63
Discharge: HOME | End: 2023-11-22
Payer: COMMERCIAL

## 2023-11-20 DIAGNOSIS — E55.9 VITAMIN D DEFICIENCY DISEASE: ICD-10-CM

## 2023-11-20 DIAGNOSIS — R06.02 SHORTNESS OF BREATH: ICD-10-CM

## 2023-11-20 DIAGNOSIS — I47.10 PSVT (PAROXYSMAL SUPRAVENTRICULAR TACHYCARDIA): ICD-10-CM

## 2023-11-20 DIAGNOSIS — I10 ESSENTIAL HYPERTENSION: Primary | ICD-10-CM

## 2023-11-20 DIAGNOSIS — I10 ESSENTIAL HYPERTENSION: ICD-10-CM

## 2023-11-20 LAB
25(OH)D3 SERPL-MCNC: 24.7 NG/ML
ALBUMIN SERPL-MCNC: 3.6 G/DL (ref 3.5–5.2)
ALP SERPL-CCNC: 108 U/L (ref 35–104)
ALT SERPL-CCNC: 17 U/L (ref 5–33)
ANION GAP SERPL CALCULATED.3IONS-SCNC: 8 MMOL/L (ref 9–17)
AST SERPL-CCNC: 20 U/L
BASOPHILS # BLD: 0.01 K/UL (ref 0–0.2)
BASOPHILS NFR BLD: 0 % (ref 0–2)
BILIRUB SERPL-MCNC: 0.4 MG/DL (ref 0.3–1.2)
BUN SERPL-MCNC: 15 MG/DL (ref 8–23)
BUN/CREAT SERPL: 12 (ref 9–20)
CALCIUM SERPL-MCNC: 9.1 MG/DL (ref 8.6–10.4)
CHLORIDE SERPL-SCNC: 101 MMOL/L (ref 98–107)
CHOLEST SERPL-MCNC: 154 MG/DL
CHOLESTEROL/HDL RATIO: 3.6
CO2 SERPL-SCNC: 28 MMOL/L (ref 20–31)
CREAT SERPL-MCNC: 1.3 MG/DL (ref 0.5–0.9)
EOSINOPHIL # BLD: 0 K/UL (ref 0–0.4)
EOSINOPHILS RELATIVE PERCENT: 0 % (ref 0–5)
ERYTHROCYTE [DISTWIDTH] IN BLOOD BY AUTOMATED COUNT: 16 % (ref 12.1–15.2)
GFR SERPL CREATININE-BSD FRML MDRD: 46 ML/MIN/1.73M2
GLUCOSE SERPL-MCNC: 184 MG/DL (ref 70–99)
HCT VFR BLD AUTO: 38.7 % (ref 36–46)
HDLC SERPL-MCNC: 43 MG/DL
HGB BLD-MCNC: 12.2 G/DL (ref 12–16)
IMM GRANULOCYTES # BLD AUTO: 0.02 K/UL (ref 0–0.3)
IMM GRANULOCYTES NFR BLD: 0 % (ref 0–5)
LDLC SERPL CALC-MCNC: 73 MG/DL (ref 0–130)
LYMPHOCYTES NFR BLD: 1.51 K/UL (ref 1–4.8)
LYMPHOCYTES RELATIVE PERCENT: 25 % (ref 15–40)
MAGNESIUM SERPL-MCNC: 1.9 MG/DL (ref 1.6–2.6)
MCH RBC QN AUTO: 29.5 PG (ref 26–34)
MCHC RBC AUTO-ENTMCNC: 31.5 G/DL (ref 31–37)
MCV RBC AUTO: 93.7 FL (ref 80–100)
MONOCYTES NFR BLD: 0.47 K/UL (ref 0–1)
MONOCYTES NFR BLD: 8 % (ref 4–8)
NEUTROPHILS NFR BLD: 67 % (ref 47–75)
NEUTS SEG NFR BLD: 4.08 K/UL (ref 2.5–7)
PLATELET # BLD AUTO: 198 K/UL (ref 140–450)
PMV BLD AUTO: 9 FL (ref 6–12)
POTASSIUM SERPL-SCNC: 4.7 MMOL/L (ref 3.7–5.3)
PROT SERPL-MCNC: 6.8 G/DL (ref 6.4–8.3)
RBC # BLD AUTO: 4.13 M/UL (ref 4–5.2)
SODIUM SERPL-SCNC: 137 MMOL/L (ref 135–144)
TRIGL SERPL-MCNC: 191 MG/DL
TSH SERPL DL<=0.05 MIU/L-ACNC: 3.42 UIU/ML (ref 0.3–5)
WBC OTHER # BLD: 6.1 K/UL (ref 3.5–11)

## 2023-11-20 PROCEDURE — 36415 COLL VENOUS BLD VENIPUNCTURE: CPT

## 2023-11-20 PROCEDURE — 71046 X-RAY EXAM CHEST 2 VIEWS: CPT

## 2023-11-20 PROCEDURE — 80053 COMPREHEN METABOLIC PANEL: CPT

## 2023-11-20 PROCEDURE — 82306 VITAMIN D 25 HYDROXY: CPT

## 2023-11-20 PROCEDURE — 83735 ASSAY OF MAGNESIUM: CPT

## 2023-11-20 PROCEDURE — 85025 COMPLETE CBC W/AUTO DIFF WBC: CPT

## 2023-11-20 PROCEDURE — 93005 ELECTROCARDIOGRAM TRACING: CPT

## 2023-11-20 PROCEDURE — 84443 ASSAY THYROID STIM HORMONE: CPT

## 2023-11-20 PROCEDURE — 80061 LIPID PANEL: CPT

## 2023-11-21 LAB
EKG ATRIAL RATE: 62 BPM
EKG P AXIS: 48 DEGREES
EKG P-R INTERVAL: 212 MS
EKG Q-T INTERVAL: 398 MS
EKG QRS DURATION: 88 MS
EKG QTC CALCULATION (BAZETT): 403 MS
EKG R AXIS: 42 DEGREES
EKG T AXIS: 47 DEGREES
EKG VENTRICULAR RATE: 62 BPM

## 2023-11-21 PROCEDURE — 93010 ELECTROCARDIOGRAM REPORT: CPT | Performed by: INTERNAL MEDICINE

## 2023-12-04 ENCOUNTER — TELEPHONE (OUTPATIENT)
Dept: FAMILY MEDICINE CLINIC | Age: 63
End: 2023-12-04

## 2023-12-04 NOTE — TELEPHONE ENCOUNTER
Pt called on-call service Sat 12/2 c/o nausea and diarrhea that had been going on for several days. Was trying otc kaopectate without much relief and she was asking about any other med for diarrhea. She was feeling weak and lightheaded. No abd pain. I recommended ER for eval but she wasn't sure whether she would go or not. Was eating & drinking very little so I did advise that she increase fluid intake and try some foods that may firm up her stools like white rice, bananas, cheese.

## 2023-12-06 ENCOUNTER — HOSPITAL ENCOUNTER (OUTPATIENT)
Age: 63
Discharge: HOME | End: 2023-12-08
Payer: COMMERCIAL

## 2023-12-06 ENCOUNTER — OFFICE VISIT (OUTPATIENT)
Dept: FAMILY MEDICINE CLINIC | Age: 63
End: 2023-12-06
Payer: COMMERCIAL

## 2023-12-06 ENCOUNTER — HOSPITAL ENCOUNTER (OUTPATIENT)
Dept: GENERAL RADIOLOGY | Age: 63
Discharge: HOME | End: 2023-12-08
Payer: COMMERCIAL

## 2023-12-06 ENCOUNTER — HOSPITAL ENCOUNTER (EMERGENCY)
Age: 63
End: 2023-12-06
Payer: COMMERCIAL

## 2023-12-06 VITALS
OXYGEN SATURATION: 97 % | DIASTOLIC BLOOD PRESSURE: 72 MMHG | HEART RATE: 64 BPM | SYSTOLIC BLOOD PRESSURE: 108 MMHG | TEMPERATURE: 97.3 F

## 2023-12-06 DIAGNOSIS — M25.552 LEFT HIP PAIN: ICD-10-CM

## 2023-12-06 DIAGNOSIS — A08.4 VIRAL GASTROENTERITIS: Primary | ICD-10-CM

## 2023-12-06 PROCEDURE — 3078F DIAST BP <80 MM HG: CPT | Performed by: NURSE PRACTITIONER

## 2023-12-06 PROCEDURE — 73502 X-RAY EXAM HIP UNI 2-3 VIEWS: CPT

## 2023-12-06 PROCEDURE — 3074F SYST BP LT 130 MM HG: CPT | Performed by: NURSE PRACTITIONER

## 2023-12-06 PROCEDURE — 99213 OFFICE O/P EST LOW 20 MIN: CPT | Performed by: NURSE PRACTITIONER

## 2023-12-06 RX ORDER — ONDANSETRON 4 MG/1
4 TABLET, ORALLY DISINTEGRATING ORAL 3 TIMES DAILY PRN
Qty: 21 TABLET | Refills: 0 | Status: SHIPPED | OUTPATIENT
Start: 2023-12-06

## 2023-12-06 ASSESSMENT — ENCOUNTER SYMPTOMS
VOMITING: 0
SHORTNESS OF BREATH: 0
NAUSEA: 1
DIARRHEA: 0
COUGH: 0

## 2023-12-06 NOTE — PROGRESS NOTES
HPI Notes    Name: Edgar Thomson  : 1960         Chief Complaint:     Chief Complaint   Patient presents with    Nausea & Vomiting     Patient here today with nausea x 3 weeks. No vomiting. Just feels bad. Did have diarrhea but this has improved. History of Present Illness:        Nausea & Vomiting  This is a new problem. The current episode started 1 to 4 weeks ago. The problem occurs 2 to 4 times per day. The problem has been waxing and waning. Associated symptoms include anorexia and nausea. Pertinent negatives include no chest pain, chills, coughing, fever, headaches, numbness or vomiting. Hip Pain   There was no injury mechanism (symptoms started 2 months ago). The pain is present in the left hip. The quality of the pain is described as aching. The pain is moderate. The pain has been Fluctuating since onset. Pertinent negatives include no numbness or tingling. The symptoms are aggravated by movement and weight bearing. She has tried non-weight bearing and NSAIDs (takes celebrex 100mg BID) for the symptoms.        Past Medical History:     Past Medical History:   Diagnosis Date    Acid reflux     Bleeding tendency (HCC)     Blood type O+     Bronchitis     Chronic back pain     Depression     Diverticulitis     DVT (deep venous thrombosis) ([address])     Left leg    Fibromyalgia     Hypertension     Internal hemorrhoid     Left thyroid nodule 2013    Nausea & vomiting     Pulmonary embolism (HCC)       Reviewed all health maintenance requirements and ordered appropriate tests  Health Maintenance Due   Topic Date Due    COVID-19 Vaccine (1) Never done    DTaP/Tdap/Td vaccine (1 - Tdap) Never done    Cervical cancer screen  Never done    Shingles vaccine (1 of 2) Never done    Breast cancer screen  05/10/2018    Respiratory Syncytial Virus (RSV) age 61 yrs+ (1 - 1-dose 60+ series) Never done    Colorectal Cancer Screen  2022    Flu vaccine (1) 2023       Past Surgical History:

## 2023-12-07 ENCOUNTER — TELEPHONE (OUTPATIENT)
Age: 63
End: 2023-12-07

## 2023-12-07 DIAGNOSIS — M25.552 PAIN OF LEFT HIP: Primary | ICD-10-CM

## 2023-12-07 RX ORDER — TRAMADOL HYDROCHLORIDE 50 MG/1
50 TABLET ORAL EVERY 6 HOURS PRN
Qty: 12 TABLET | Refills: 0 | Status: SHIPPED | OUTPATIENT
Start: 2023-12-07 | End: 2023-12-10

## 2023-12-07 NOTE — TELEPHONE ENCOUNTER
----- Message from Isabel Workman MA sent at 12/7/2023  3:10 PM EST -----  Pt informed of results. Requesting pain medication, other than tylenol that may help? ? Linwood Fay.

## 2023-12-08 ENCOUNTER — TELEPHONE (OUTPATIENT)
Dept: FAMILY MEDICINE CLINIC | Age: 63
End: 2023-12-08

## 2023-12-08 NOTE — TELEPHONE ENCOUNTER
----- Message from ASHLEY Huggins CNP sent at 12/7/2023  9:12 AM EST -----  Please let pt know that her left hip xray was normal. I can refer her back to orthopedics if she would like for further evaluation.

## 2023-12-08 NOTE — TELEPHONE ENCOUNTER
Pt informed of results. Requesting pain medication, other than tylenol that may help? ? Kevon Jaimes.

## 2023-12-28 ENCOUNTER — TELEPHONE (OUTPATIENT)
Dept: FAMILY MEDICINE CLINIC | Age: 63
End: 2023-12-28

## 2023-12-28 NOTE — TELEPHONE ENCOUNTER
May try the following:    Coricidin HBP Cold and Cough 1 tab every 6 hours as needed (nasal decongestant and antihistamine)  Flonase 2 spray each nostril twice daily (nasal steroid)  Warm tea with 1tbsp honey (soothes the throat)  Increase water intake  Rest    After being sick a few days, if she would like tested for Flu A, B, or covid we can do that.

## 2023-12-28 NOTE — TELEPHONE ENCOUNTER
Made patient aware of recommendations, and offered to make nurse visit to be swabbed for Covid, flu A and flu B. Patient understood and will contact the  if she decides to get swabbed for flu/ covid.

## 2023-12-28 NOTE — TELEPHONE ENCOUNTER
Dioni Farias has come down with a bad chest cold. She is not sure what to take that will not react to her other medication. She was not sure about NyQuil either. Her phone number is [phone] with Dioni Farias. She has a terrible sore throat, nasal drainage, and a slight cough. Started feeling sick yesterday.

## 2024-01-01 ENCOUNTER — HOSPITAL ENCOUNTER (EMERGENCY)
Age: 64
End: 2024-11-11
Attending: EMERGENCY MEDICINE
Payer: COMMERCIAL

## 2024-01-01 DIAGNOSIS — I27.29 PULMONARY HYPERTENSION ASSOCIATED WITH UNCLEAR MULTI-FACTORIAL MECHANISMS (HCC): ICD-10-CM

## 2024-01-01 DIAGNOSIS — I46.9 CARDIOPULMONARY ARREST: Primary | ICD-10-CM

## 2024-01-01 DIAGNOSIS — I10 ESSENTIAL HYPERTENSION: ICD-10-CM

## 2024-01-01 PROCEDURE — 6360000002 HC RX W HCPCS: Performed by: EMERGENCY MEDICINE

## 2024-01-01 PROCEDURE — 92950 HEART/LUNG RESUSCITATION CPR: CPT

## 2024-01-01 PROCEDURE — 99285 EMERGENCY DEPT VISIT HI MDM: CPT

## 2024-01-01 RX ORDER — EPINEPHRINE IN SOD CHLOR,ISO 1 MG/10 ML
SYRINGE (ML) INTRAVENOUS DAILY PRN
Status: COMPLETED | OUTPATIENT
Start: 2024-01-01 | End: 2024-01-01

## 2024-01-01 RX ADMIN — Medication 1 MG: at 21:57

## 2024-01-02 RX ORDER — FUROSEMIDE 20 MG/1
20 TABLET ORAL DAILY
Qty: 90 TABLET | Refills: 0 | Status: SHIPPED | OUTPATIENT
Start: 2024-01-02

## 2024-01-02 RX ORDER — FAMOTIDINE 20 MG/1
20 TABLET, FILM COATED ORAL 2 TIMES DAILY
Qty: 180 TABLET | Refills: 0 | Status: SHIPPED | OUTPATIENT
Start: 2024-01-02

## 2024-01-02 NOTE — TELEPHONE ENCOUNTER
Last OV 10/11/2023    Requesting refill on Furosemide, famotidine thru [facility].   Rx pending.     Next OV- not scheduled

## 2024-01-02 NOTE — TELEPHONE ENCOUNTER
LOV-12/04/2023  NOV-3/04/2024  LF-12/13/2023- metoprolol         11/28/2023- hydrocodone      rx pending for charleen Lewis,   When you go to sign this rx let someone know , we will need to put plain paper in instead of rx paper due to faxing  Thank you

## 2024-01-21 DIAGNOSIS — M79.7 FIBROMYALGIA: ICD-10-CM

## 2024-01-22 RX ORDER — PREGABALIN 150 MG/1
150 CAPSULE ORAL 2 TIMES DAILY
Qty: 180 CAPSULE | Refills: 0 | Status: SHIPPED | OUTPATIENT
Start: 2024-01-22 | End: 2024-04-21

## 2024-01-22 NOTE — TELEPHONE ENCOUNTER
Last OV 12/06/23     Requesting refill on Lyrica thru [facility].  Rx pending     Next OV not scheduled

## 2024-01-24 ENCOUNTER — TELEPHONE (OUTPATIENT)
Dept: FAMILY MEDICINE CLINIC | Age: 64
End: 2024-01-24

## 2024-01-25 NOTE — TELEPHONE ENCOUNTER
Advised pt of provider's notes, she stated that she canceled her appointment, she is feeling better.   
No labs needed since they were done in November.  I will review them with her when she comes in  
      Next Visit Date:  Future Appointments   Date Time Provider Department Center   1/26/2024 10:20 AM Jimenez Malagon DNP [facility]            Patient Active Problem List:     Fibromyalgia     Follicular adenoma of thyroid gland     S/P laparoscopic cholecystectomy     Right hip pain     Bilateral leg edema     Chronic fatigue     Left thyroid nodule     Chronic back pain greater than 3 months duration     Shortness of breath     Pulmonary hypertension associated with unclear multi-factorial mechanisms (HCC)     Morbid obesity due to excess calories (HCC)     MAYURI (obstructive sleep apnea)     Essential hypertension     Coxitis     History of total hip replacement     Gastroesophageal reflux disease without esophagitis     Polyarthritis     Mixed incontinence     Constipation     Acute cystitis without hematuria     RLS (restless legs syndrome)     Cellulitis of leg, right     PSVT (paroxysmal supraventricular tachycardia)     Cellulitis of right leg     Venous insufficiency of both lower extremities     Encounter for monitoring amiodarone therapy

## 2024-01-28 ENCOUNTER — PATIENT MESSAGE (OUTPATIENT)
Dept: FAMILY MEDICINE CLINIC | Age: 64
End: 2024-01-28

## 2024-01-29 RX ORDER — CIPROFLOXACIN 500 MG/1
500 TABLET, FILM COATED ORAL 2 TIMES DAILY
Qty: 14 TABLET | Refills: 0 | Status: SHIPPED | OUTPATIENT
Start: 2024-01-29 | End: 2024-02-05

## 2024-01-29 NOTE — TELEPHONE ENCOUNTER
From: Marifer Lee  To: Jimenez Malagon  Sent: 1/28/2024 10:31 PM EST  Subject: Prescription     I woke up Saturday with a UTI. Can I get an antibiotic?

## 2024-01-29 NOTE — TELEPHONE ENCOUNTER
Last OV 12/6/23 for viral gastroenteritis  She had OV scheduled for 1/26/24 and she cancelled  Please advise  Allergy to ampicillin and sulfa

## 2024-02-07 NOTE — TELEPHONE ENCOUNTER
Medication pending to print, pt will  the Rx.      Pt needs Eliquis for 30 days supply sent to [facility]

## 2024-02-07 NOTE — TELEPHONE ENCOUNTER
Eliquis 5 mg  Celebrex 100 mg    Patient would like 90 day put would like these printed out. She mails them in herself. She will need a 30 day of eliquis called into [facility].    Please let Marifer know when she can pick these up at the office.      Health Maintenance   Topic Date Due    COVID-19 Vaccine (1) Never done    DTaP/Tdap/Td vaccine (1 - Tdap) Never done    Cervical cancer screen  Never done    Shingles vaccine (1 of 2) Never done    Breast cancer screen  05/10/2018    Respiratory Syncytial Virus (RSV) Pregnant or age 60 yrs+ (1 - 1-dose 60+ series) Never done    Colorectal Cancer Screen  04/05/2022    Flu vaccine (1) 08/01/2023    A1C test (Diabetic or Prediabetic)  10/11/2024    Depression Screen  10/11/2024    GFR test (Diabetes, CKD 3-4, OR last GFR 15-59)  11/20/2024    Lipids  11/20/2028    Hepatitis C screen  Addressed    HIV screen  Addressed    Hepatitis A vaccine  Aged Out    Hepatitis B vaccine  Aged Out    Hib vaccine  Aged Out    Polio vaccine  Aged Out    Meningococcal (ACWY) vaccine  Aged Out    Pneumococcal 0-64 years Vaccine  Aged Out             (applicable per patient's age: Cancer Screenings, Depression Screening, Fall Risk Screening, Immunizations)    Hemoglobin A1C (%)   Date Value   10/11/2023 5.9   10/27/2020 6.1   06/17/2019 6.1     LDL Cholesterol (mg/dL)   Date Value   11/20/2023 73     LDL Calculated (mg/dL)   Date Value   05/23/2019 131     AST (U/L)   Date Value   11/20/2023 20     ALT (U/L)   Date Value   11/20/2023 17     BUN (mg/dL)   Date Value   11/20/2023 15      (goal A1C is < 7)   (goal LDL is <100) need 30-50% reduction from baseline     BP Readings from Last 3 Encounters:   12/06/23 108/72   10/11/23 102/70   02/06/23 128/70    (goal /80)      All Future Testing planned in [facility]:  Lab Frequency Next Occurrence       Next Visit Date:  No future appointments.         Patient Active Problem List:     Fibromyalgia     Follicular adenoma of thyroid gland     S/P

## 2024-02-08 RX ORDER — CELECOXIB 100 MG/1
100 CAPSULE ORAL 2 TIMES DAILY
Qty: 180 CAPSULE | Refills: 1 | Status: SHIPPED | OUTPATIENT
Start: 2024-02-08

## 2024-03-02 ENCOUNTER — HOSPITAL ENCOUNTER (EMERGENCY)
Age: 64
Discharge: HOME | End: 2024-03-02
Attending: FAMILY MEDICINE
Payer: COMMERCIAL

## 2024-03-02 VITALS
DIASTOLIC BLOOD PRESSURE: 94 MMHG | BODY MASS INDEX: 41.95 KG/M2 | OXYGEN SATURATION: 92 % | HEART RATE: 85 BPM | SYSTOLIC BLOOD PRESSURE: 119 MMHG | RESPIRATION RATE: 24 BRPM | WEIGHT: 293 LBS | HEIGHT: 70 IN | TEMPERATURE: 97.8 F

## 2024-03-02 DIAGNOSIS — N39.0 URINARY TRACT INFECTION WITHOUT HEMATURIA, SITE UNSPECIFIED: Primary | ICD-10-CM

## 2024-03-02 LAB
-: ABNORMAL
BACTERIA URNS QL MICRO: ABNORMAL
BILIRUB UR QL STRIP: NEGATIVE
CLARITY UR: CLEAR
COLOR UR: YELLOW
COMMENT: ABNORMAL
GLUCOSE UR STRIP-MCNC: NEGATIVE MG/DL
HGB UR QL STRIP.AUTO: ABNORMAL
KETONES UR STRIP-MCNC: NEGATIVE MG/DL
LEUKOCYTE ESTERASE UR QL STRIP: ABNORMAL
NITRITE UR QL STRIP: NEGATIVE
PH UR STRIP: 5 [PH] (ref 5–8)
PROT UR STRIP-MCNC: ABNORMAL MG/DL
RBC #/AREA URNS HPF: ABNORMAL /HPF (ref 0–2)
SP GR UR STRIP: 1.01 (ref 1–1.03)
UROBILINOGEN UR STRIP-ACNC: NORMAL EU/DL (ref 0–1)
WBC #/AREA URNS HPF: ABNORMAL /HPF

## 2024-03-02 PROCEDURE — 87186 SC STD MICRODIL/AGAR DIL: CPT

## 2024-03-02 PROCEDURE — 87077 CULTURE AEROBIC IDENTIFY: CPT

## 2024-03-02 PROCEDURE — 6370000000 HC RX 637 (ALT 250 FOR IP): Performed by: FAMILY MEDICINE

## 2024-03-02 PROCEDURE — 87086 URINE CULTURE/COLONY COUNT: CPT

## 2024-03-02 PROCEDURE — 81001 URINALYSIS AUTO W/SCOPE: CPT

## 2024-03-02 PROCEDURE — 99283 EMERGENCY DEPT VISIT LOW MDM: CPT

## 2024-03-02 RX ORDER — NITROFURANTOIN 25; 75 MG/1; MG/1
100 CAPSULE ORAL ONCE
Status: COMPLETED | OUTPATIENT
Start: 2024-03-02 | End: 2024-03-02

## 2024-03-02 RX ORDER — NITROFURANTOIN 25; 75 MG/1; MG/1
100 CAPSULE ORAL 2 TIMES DAILY
Qty: 14 CAPSULE | Refills: 0 | Status: SHIPPED | OUTPATIENT
Start: 2024-03-02 | End: 2024-03-09

## 2024-03-02 RX ADMIN — NITROFURANTOIN MONOHYDRATE/MACROCRYSTALLINE 100 MG: 25; 75 CAPSULE ORAL at 21:32

## 2024-03-02 ASSESSMENT — PAIN - FUNCTIONAL ASSESSMENT: PAIN_FUNCTIONAL_ASSESSMENT: NONE - DENIES PAIN

## 2024-03-02 ASSESSMENT — LIFESTYLE VARIABLES
HOW OFTEN DO YOU HAVE A DRINK CONTAINING ALCOHOL: NEVER
HOW MANY STANDARD DRINKS CONTAINING ALCOHOL DO YOU HAVE ON A TYPICAL DAY: PATIENT DOES NOT DRINK

## 2024-03-03 NOTE — ED PROVIDER NOTES
eMERGENCY dEPARTMENT eNCOUnter      CHIEF COMPLAINT    Chief Complaint   Patient presents with    Urinary Frequency     Patient thinks she has UTI. States she is freezing and just feels like she has one.       HPI    Marifer Lee is a 63 y.o. female who presents to ED from her home with a complaint of urinary frequency and burning with urination.  Symptoms have been present for 2 days.  Symptoms are described as being moderate in severity          REVIEW OF SYSTEMS    All systems reviewed and positives are in the HPI    PAST MEDICAL HISTORY    Past Medical History:   Diagnosis Date    Acid reflux     Bleeding tendency (HCC)     Blood type O+     Bronchitis     Chronic back pain     Depression     Diverticulitis     DVT (deep venous thrombosis) ([facility]) 2012    Left leg    Fibromyalgia     Hypertension     Internal hemorrhoid     Left thyroid nodule 7/25/2013    Nausea & vomiting     Pulmonary embolism ([facility])        SURGICAL HISTORY    Past Surgical History:   Procedure Laterality Date    BACK INJECTION Bilateral 6/4/2021    SACROILIAC JOINT INJECTION BILATERAL performed by Rodo Dominguez MD at [facility] OR    CHOLECYSTECTOMY  08/22/2013    laparoscopic    COLONOSCOPY      2012/ Dr. Mathis    TOTAL KNEE ARTHROPLASTY Right     TOTAL KNEE ARTHROPLASTY Left        CURRENT MEDICATIONS    Current Outpatient Rx   Medication Sig Dispense Refill    nitrofurantoin, macrocrystal-monohydrate, (MACROBID) 100 MG capsule Take 1 capsule by mouth 2 times daily for 7 days 14 capsule 0    apixaban (ELIQUIS) 5 MG TABS tablet Take 1 tablet by mouth 2 times daily 30 tablet 0    celecoxib (CELEBREX) 100 MG capsule Take 1 capsule by mouth 2 times daily 180 capsule 1    pregabalin (LYRICA) 150 MG capsule Take 1 capsule by mouth 2 times daily for 90 days. Max Daily Amount: 300 mg 180 capsule 0    famotidine (PEPCID) 20 MG tablet Take 1 tablet by mouth 2 times daily 180 tablet 0    furosemide (LASIX) 20 MG tablet Take 1 tablet by mouth daily

## 2024-03-05 LAB
MICROORGANISM SPEC CULT: ABNORMAL
SPECIMEN DESCRIPTION: ABNORMAL

## 2024-03-27 DIAGNOSIS — G25.81 RESTLESS LEGS: ICD-10-CM

## 2024-03-27 NOTE — TELEPHONE ENCOUNTER
Last OV: 12/6/2023   10/11/23 rls   Last RX:    Next scheduled apt: Visit date not found            PT REQUESTING A REFILL

## 2024-03-28 RX ORDER — ROPINIROLE 1 MG/1
1 TABLET, FILM COATED ORAL 3 TIMES DAILY
Qty: 90 TABLET | Refills: 2 | Status: SHIPPED | OUTPATIENT
Start: 2024-03-28

## 2024-04-05 RX ORDER — FAMOTIDINE 20 MG/1
20 TABLET, FILM COATED ORAL 2 TIMES DAILY
Qty: 180 TABLET | Refills: 0 | Status: SHIPPED | OUTPATIENT
Start: 2024-04-05

## 2024-04-05 NOTE — TELEPHONE ENCOUNTER
Rx refill request via [facility]  Famotidine 20mg BID  Last ov 10-11-23 for cellulitis, leg pain.  Pt currently does not have f/u

## 2024-05-05 DIAGNOSIS — M79.7 FIBROMYALGIA: ICD-10-CM

## 2024-05-06 RX ORDER — PREGABALIN 150 MG/1
150 CAPSULE ORAL 2 TIMES DAILY
Qty: 180 CAPSULE | Refills: 0 | Status: SHIPPED | OUTPATIENT
Start: 2024-05-06 | End: 2024-08-04

## 2024-05-06 RX ORDER — AMITRIPTYLINE HYDROCHLORIDE 100 MG/1
100 TABLET ORAL NIGHTLY PRN
Qty: 90 TABLET | Refills: 1 | Status: SHIPPED | OUTPATIENT
Start: 2024-05-06

## 2024-05-06 NOTE — TELEPHONE ENCOUNTER
[facility]  Amitriptyline 100mg QD and lyrica 150mg BID  Last OV for leg pain 10/11/23  Pt currently does not have f/u

## 2024-05-28 RX ORDER — DILTIAZEM HYDROCHLORIDE 120 MG/1
120 CAPSULE, COATED, EXTENDED RELEASE ORAL DAILY
Qty: 90 CAPSULE | Refills: 3 | Status: SHIPPED | OUTPATIENT
Start: 2024-05-28

## 2024-06-19 DIAGNOSIS — E04.1 LEFT THYROID NODULE: ICD-10-CM

## 2024-06-19 DIAGNOSIS — R53.82 CHRONIC FATIGUE: Primary | ICD-10-CM

## 2024-06-19 DIAGNOSIS — E55.9 VITAMIN D DEFICIENCY: ICD-10-CM

## 2024-06-19 DIAGNOSIS — I47.10 PSVT (PAROXYSMAL SUPRAVENTRICULAR TACHYCARDIA) (HCC): ICD-10-CM

## 2024-06-19 DIAGNOSIS — Z13.220 ENCOUNTER FOR LIPID SCREENING FOR CARDIOVASCULAR DISEASE: ICD-10-CM

## 2024-06-19 DIAGNOSIS — I27.29 PULMONARY HYPERTENSION ASSOCIATED WITH UNCLEAR MULTI-FACTORIAL MECHANISMS (HCC): ICD-10-CM

## 2024-06-19 DIAGNOSIS — Z13.6 ENCOUNTER FOR LIPID SCREENING FOR CARDIOVASCULAR DISEASE: ICD-10-CM

## 2024-06-19 DIAGNOSIS — I10 ESSENTIAL HYPERTENSION: ICD-10-CM

## 2024-06-19 DIAGNOSIS — R06.02 SHORTNESS OF BREATH: ICD-10-CM

## 2024-06-24 ENCOUNTER — HOSPITAL ENCOUNTER (OUTPATIENT)
Age: 64
Discharge: HOME | End: 2024-06-24
Payer: COMMERCIAL

## 2024-06-24 DIAGNOSIS — I47.10 PSVT (PAROXYSMAL SUPRAVENTRICULAR TACHYCARDIA) (HCC): ICD-10-CM

## 2024-06-24 DIAGNOSIS — E55.9 VITAMIN D DEFICIENCY: ICD-10-CM

## 2024-06-24 DIAGNOSIS — I10 ESSENTIAL HYPERTENSION: ICD-10-CM

## 2024-06-24 DIAGNOSIS — I27.29 PULMONARY HYPERTENSION ASSOCIATED WITH UNCLEAR MULTI-FACTORIAL MECHANISMS (HCC): ICD-10-CM

## 2024-06-24 DIAGNOSIS — Z13.6 ENCOUNTER FOR LIPID SCREENING FOR CARDIOVASCULAR DISEASE: ICD-10-CM

## 2024-06-24 DIAGNOSIS — E04.1 LEFT THYROID NODULE: ICD-10-CM

## 2024-06-24 DIAGNOSIS — R53.82 CHRONIC FATIGUE: ICD-10-CM

## 2024-06-24 DIAGNOSIS — R06.02 SHORTNESS OF BREATH: ICD-10-CM

## 2024-06-24 DIAGNOSIS — Z13.220 ENCOUNTER FOR LIPID SCREENING FOR CARDIOVASCULAR DISEASE: ICD-10-CM

## 2024-06-24 LAB
25(OH)D3 SERPL-MCNC: 43.5 NG/ML (ref 30–100)
ALBUMIN SERPL-MCNC: 3.7 G/DL (ref 3.5–5.2)
ALP SERPL-CCNC: 107 U/L (ref 35–104)
ALT SERPL-CCNC: 14 U/L (ref 5–33)
ANION GAP SERPL CALCULATED.3IONS-SCNC: 8 MMOL/L (ref 9–17)
AST SERPL-CCNC: 18 U/L
BASOPHILS # BLD: 0 K/UL (ref 0–0.2)
BASOPHILS NFR BLD: 0 % (ref 0–2)
BILIRUB SERPL-MCNC: 0.5 MG/DL (ref 0.3–1.2)
BUN SERPL-MCNC: 22 MG/DL (ref 8–23)
BUN/CREAT SERPL: 17 (ref 9–20)
CALCIUM SERPL-MCNC: 9.5 MG/DL (ref 8.6–10.4)
CHLORIDE SERPL-SCNC: 102 MMOL/L (ref 98–107)
CHOLEST SERPL-MCNC: 146 MG/DL (ref 0–199)
CHOLESTEROL/HDL RATIO: 3
CO2 SERPL-SCNC: 29 MMOL/L (ref 20–31)
CREAT SERPL-MCNC: 1.3 MG/DL (ref 0.5–0.9)
EKG ATRIAL RATE: 67 BPM
EKG P AXIS: 74 DEGREES
EKG P-R INTERVAL: 226 MS
EKG Q-T INTERVAL: 388 MS
EKG QRS DURATION: 92 MS
EKG QTC CALCULATION (BAZETT): 409 MS
EKG R AXIS: 61 DEGREES
EKG T AXIS: 65 DEGREES
EKG VENTRICULAR RATE: 67 BPM
EOSINOPHIL # BLD: 0 K/UL (ref 0–0.4)
EOSINOPHILS RELATIVE PERCENT: 0 % (ref 0–5)
ERYTHROCYTE [DISTWIDTH] IN BLOOD BY AUTOMATED COUNT: 15.2 % (ref 12.1–15.2)
GFR, ESTIMATED: 46 ML/MIN/1.73M2
GLUCOSE SERPL-MCNC: 144 MG/DL (ref 70–99)
HCT VFR BLD AUTO: 39.8 % (ref 36–46)
HDLC SERPL-MCNC: 47 MG/DL
HGB BLD-MCNC: 12.9 G/DL (ref 12–16)
IMM GRANULOCYTES # BLD AUTO: 0.01 K/UL (ref 0–0.3)
IMM GRANULOCYTES NFR BLD: 0 % (ref 0–5)
LDLC SERPL CALC-MCNC: 77 MG/DL (ref 0–100)
LYMPHOCYTES NFR BLD: 1.66 K/UL (ref 1–4.8)
LYMPHOCYTES RELATIVE PERCENT: 28 % (ref 15–40)
MAGNESIUM SERPL-MCNC: 2 MG/DL (ref 1.6–2.6)
MCH RBC QN AUTO: 29.7 PG (ref 26–34)
MCHC RBC AUTO-ENTMCNC: 32.4 G/DL (ref 31–37)
MCV RBC AUTO: 91.7 FL (ref 80–100)
MONOCYTES NFR BLD: 0.57 K/UL (ref 0–1)
MONOCYTES NFR BLD: 10 % (ref 4–8)
NEUTROPHILS NFR BLD: 62 % (ref 47–75)
NEUTS SEG NFR BLD: 3.64 K/UL (ref 2.5–7)
PLATELET # BLD AUTO: 190 K/UL (ref 140–450)
PMV BLD AUTO: 9.4 FL (ref 6–12)
POTASSIUM SERPL-SCNC: 4.9 MMOL/L (ref 3.7–5.3)
PROT SERPL-MCNC: 6.9 G/DL (ref 6.4–8.3)
RBC # BLD AUTO: 4.34 M/UL (ref 4–5.2)
SODIUM SERPL-SCNC: 139 MMOL/L (ref 135–144)
TRIGL SERPL-MCNC: 112 MG/DL
TSH SERPL DL<=0.05 MIU/L-ACNC: 2.47 UIU/ML (ref 0.3–5)
VLDLC SERPL CALC-MCNC: 22 MG/DL
WBC OTHER # BLD: 5.9 K/UL (ref 3.5–11)

## 2024-06-24 PROCEDURE — 80053 COMPREHEN METABOLIC PANEL: CPT

## 2024-06-24 PROCEDURE — 83735 ASSAY OF MAGNESIUM: CPT

## 2024-06-24 PROCEDURE — 82306 VITAMIN D 25 HYDROXY: CPT

## 2024-06-24 PROCEDURE — 36415 COLL VENOUS BLD VENIPUNCTURE: CPT

## 2024-06-24 PROCEDURE — 84443 ASSAY THYROID STIM HORMONE: CPT

## 2024-06-24 PROCEDURE — 93010 ELECTROCARDIOGRAM REPORT: CPT | Performed by: INTERNAL MEDICINE

## 2024-06-24 PROCEDURE — 93005 ELECTROCARDIOGRAM TRACING: CPT

## 2024-06-24 PROCEDURE — 85025 COMPLETE CBC W/AUTO DIFF WBC: CPT

## 2024-06-24 PROCEDURE — 80061 LIPID PANEL: CPT

## 2024-06-25 RX ORDER — FAMOTIDINE 20 MG/1
20 TABLET, FILM COATED ORAL 2 TIMES DAILY
Qty: 180 TABLET | Refills: 0 | Status: SHIPPED | OUTPATIENT
Start: 2024-06-25

## 2024-06-25 NOTE — TELEPHONE ENCOUNTER
Last OV: 12/6/2023 gastroenteritis 07- reflux   Last RX:    Next scheduled apt: Visit date not found          [facility] requesting a refill

## 2024-06-27 ENCOUNTER — OFFICE VISIT (OUTPATIENT)
Dept: CARDIOLOGY CLINIC | Age: 64
End: 2024-06-27
Payer: COMMERCIAL

## 2024-06-27 VITALS
DIASTOLIC BLOOD PRESSURE: 78 MMHG | RESPIRATION RATE: 16 BRPM | HEART RATE: 80 BPM | SYSTOLIC BLOOD PRESSURE: 128 MMHG | OXYGEN SATURATION: 94 %

## 2024-06-27 DIAGNOSIS — I47.10 PSVT (PAROXYSMAL SUPRAVENTRICULAR TACHYCARDIA) (HCC): ICD-10-CM

## 2024-06-27 DIAGNOSIS — E55.9 VITAMIN D DEFICIENCY DISEASE: ICD-10-CM

## 2024-06-27 DIAGNOSIS — I10 ESSENTIAL HYPERTENSION: Primary | ICD-10-CM

## 2024-06-27 DIAGNOSIS — Z13.220 SCREENING CHOLESTEROL LEVEL: ICD-10-CM

## 2024-06-27 PROCEDURE — 3017F COLORECTAL CA SCREEN DOC REV: CPT | Performed by: NURSE PRACTITIONER

## 2024-06-27 PROCEDURE — 1036F TOBACCO NON-USER: CPT | Performed by: NURSE PRACTITIONER

## 2024-06-27 PROCEDURE — 99214 OFFICE O/P EST MOD 30 MIN: CPT | Performed by: NURSE PRACTITIONER

## 2024-06-27 PROCEDURE — 3078F DIAST BP <80 MM HG: CPT | Performed by: NURSE PRACTITIONER

## 2024-06-27 PROCEDURE — G8427 DOCREV CUR MEDS BY ELIG CLIN: HCPCS | Performed by: NURSE PRACTITIONER

## 2024-06-27 PROCEDURE — 3074F SYST BP LT 130 MM HG: CPT | Performed by: NURSE PRACTITIONER

## 2024-06-27 PROCEDURE — G8417 CALC BMI ABV UP PARAM F/U: HCPCS | Performed by: NURSE PRACTITIONER

## 2024-06-27 NOTE — PROGRESS NOTES
Ov  Amy Haddad CNP  No chest pain or sob  Occ dizziness.  Leg edema  Occ palpitations.  In ED in March with UTI  Fallen few times.    Seen nephrologist  in the  Past was ill cx appt  Never went back.    EKG in  6mths   See in 1 year

## 2024-06-27 NOTE — PROGRESS NOTES
[facility]  [address] [place], [place] [address]  Phone: [phone], Fax: [phone]     Patient: Marifer Lee  : 1960  Date of Visit: 2024    REASON FOR VISIT / CONSULTATION: Hypertension      History of Present Illness:        Dear Jimenez Malagon DNP    We had the pleasure of seeing Marifer Lee in our office today. Ms. Lee is a pleasant 64 y.o. female who had shortness of breath on 2019.  A CT scan showed pulmonary embolism in the distal right pulmonary artery.  Echocardiogram showed an EF of 60% to 65% with dilated right atrium and right ventricle.  She did have SVT at 170 beats per minute, discharged on 2019, and placed on Eliquis 5 mg b.i.d., which she has remained.     She went to the emergency room again on 2019.  An EKG showed SVT at 150 beats per minute, which converted to sinus rhythm.  She had another episode of SVT on 2020, and she was placed on flecainide 50 mg b.i.d.  She did have more edema, and was placed on Aldactone which she has continued to take.     She does have severe sleep apnea and is unable to use a CPAP mask.     She is currently wearing wraps to lower legs and uses pumps on the legs to help with her lymphedema.  She is fairly inactive but has had no new shortness of breath.  She has had no cardiac issues over the last year.      On 2024, she was seen in [facility] ER with a UTI.  She was treated with Macrobid 100 mg, 2 times daily x 7 days and UTI resolved.    She denies any abdominal pain, nausea or vomiting.  No PND or orthopnea.  Denies any fainting spells but has occasional lightheadedness.  She is walking well with no unusual shortness of breath.  Denies any nosebleeds, blood in urine or stool or unusual bruising.     Ms. Lee denies any chest pain now or in the recent past, chest discomfort, increased shortness of breath or loss of energy.  No syncope or near syncope.  No problems with her

## 2024-06-28 DIAGNOSIS — G25.81 RESTLESS LEGS: ICD-10-CM

## 2024-07-01 RX ORDER — ROPINIROLE 1 MG/1
1 TABLET, FILM COATED ORAL 3 TIMES DAILY
Qty: 270 TABLET | Refills: 1 | Status: SHIPPED | OUTPATIENT
Start: 2024-07-01

## 2024-07-01 NOTE — TELEPHONE ENCOUNTER
Rx refill request via [facility]  Ropinirole TID  Last OV for restless legs 10-11-23  Pt currently does not have f/u

## 2024-07-02 NOTE — TELEPHONE ENCOUNTER
[facility] Medicine Services  INPATIENT PROGRESS NOTE    Patient Name: Marina Joe  Date of Admission: 6/28/2024  Today's Date: 07/02/24  Length of Stay: 4  Primary Care Physician: Hanh Og APRN    Subjective   Chief Complaint: Lower back pain          HPI   Ms. Joe is a 78-year-old female from home with past medical history of breast cancer, chronic kidney disease stage IIIa, hypercholesterolemia, hypertension and chronic sinusitis, who presented to the emergency room with worsening back pain/flank pain, history was mostly provided by patient's boyfriend at bedside.  She developed back pain couple of weeks ago and was referred to a pain clinic doctor where she was prescribed gabapentin, after she took about 2-3 doses of the gabapentin, she developed dizziness. She came to the emergency room today because lower back pain and flank pain got even worse despite being on the gabapentin.  In the emergency room, she was extensively worked up with CT of the abdomen and lumbar spine, CT abdomen showed extensive mass of the left kidney extending up to the proximal left ureter as well as right adrenal gland mass suspicious for metastasis.  Urology was consulted from the emergency room for input.  7/1  As above history of chronic kidney disease hyperlipidemia hypertension presented with back pain found to have kidney mass suspicious for metastasis patient CT of the lumbar spine shows multilevel degenerative changes to include an anteriolisthesis of L3 over L4 and spondylosis at L5-S1. 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 All other systems have been reviewed and are negative unless otherwise stated.     Objective    Temp:  [97.3 °F (36.3 °C)-98.4 °F (36.9 °C)] 98.3 °F (36.8 °C)  Heart Rate:  [66-83] 75  Resp:  [16-20] 16  BP: (126-165)/(55-92) 152/60  Physical Exam    GEN: Awake, alert, interactive, in NAD  HEENT: Atraumatic, PERRLA, EOMI, Anicteric, Trachea midline  Lungs: CTAB, no wheezing/rales/rhonchi  Heart: RRR, +S1/s2, no rub  ABD: soft, nt/nd, +BS, no guarding/rebound  Extremities: atraumatic, no cyanosis, no edema  Skin: no rashes or lesions  Neuro: AAOx3, no focal deficits  Psych: normal mood & affect    Results Review:  I have reviewed the labs, radiology results, and diagnostic studies.    Laboratory Data:   Results from last 7 days   Lab Units 07/01/24  1940 07/01/24  1053 06/30/24  0434   WBC 10*3/mm3 9.20 8.22 8.64   HEMOGLOBIN g/dL 8.7* 9.2* 9.5*   HEMATOCRIT % 27.8* 29.2* 29.9*   PLATELETS 10*3/mm3 100* 74* 91*        Results from last 7 days   Lab Units 07/02/24  0414 07/01/24  0432 06/30/24  0434 06/28/24  1153 06/28/24  0617   SODIUM mmol/L 138 137 137   < > 135*   POTASSIUM mmol/L 3.6 3.7 4.0   < > 3.9   CHLORIDE mmol/L 103 102 101   < > 95*   CO2 mmol/L 24.0 24.0 24.0   < > 26.0   BUN mg/dL 30* 37* 34*   < > 32*   CREATININE mg/dL 1.67* 2.18* 2.39*   < > 2.59*   CALCIUM mg/dL 8.9 8.5* 8.4*   < > 10.0   BILIRUBIN mg/dL  --   --  <0.2  --  0.3   ALK PHOS U/L  --   --  78  --  85   ALT (SGPT) U/L  --   --  21  --  18   AST (SGOT) U/L  --   --  35*  --  22   GLUCOSE mg/dL 107* 98 120*   < > 99    < > = values in this  interval not displayed.       Culture Data:   Urine Culture   Date Value Ref Range Status   06/28/2024 >100,000 CFU/mL Mixed Bhavana Isolated  Final       Radiology Data:   Imaging Results (Last 24 Hours)       Procedure Component Value Units Date/Time    US Guided Tissue Biopsy [[id number]] Collected: 07/01/24 1430     Updated: 07/02/24 0700    Narrative:      EXAM: US GUIDED TISSUE BIOPSY-      DATE: 7/1/2024 12:38 PM     HISTORY: Assess for metastatic disease; C79.51-Secondary malignant  neoplasm of bone; N28.89-Other specified disorders of kidney and ureter;  E27.8-Other specified disorders of adrenal gland; M79.89-Other specified  soft tissue disorders; N28.9-Disorder of kidney and ureter, unspecified;  N18.9-Chronic kidney disease, unspecified; D69.6-Thrombocytopenia,  unspecified; G83.4-Cauda equina syndrome; Z74.09-Other. Patient reports  history of breast cancer in 2013 status post surgery and radiation  therapy.        COMPARISON: 6/28/2024.     TECHNIQUE: Representative images and report stored to PACS per  institutional protocol and state regulations.     PROCEDURE:  Preprocedure imaging performed demonstrating multiple peripherally  echogenic, centrally hypoechoic nodules. These demonstrated internal  vascularity. A superficial nodule RIGHT flank nodule was selected and  patient positioned with consideration for her comfort.     Risks, benefits and alternatives to the procedure were discussed with  the patient. Specifically, risks of bleeding, infection, allergy to  medicine, and non-diagnostic biopsy results were discussed with the  patient. Verbal and written informed consent was obtained.     A timeout was performed including the patient's name, date of birth,  biopsy site and laterality.     Using ultrasound, a site for biopsy of RIGHT flank subcutaneous nodule  was selected, marked and prepped in the usual sterile fashion. 1 percent   lidocaine was used for local anesthesia.     Using ultrasound  guidance, RIGHT flank subcutaneous nodule biopsy was  performed using 18 gauge Bard core needle biopsy device. 5 passes were  made. One core was used for touch prep. 2 cores were placed in formalin  and 2 cores were placed in RPMI.     Cytology deemed sample adequate. Biopsy samples were taken by cytology.      The patient tolerated the procedure well. No evidence of complication.     The patient returned to the floor in stable condition and agrees to  follow up with referring clinician for biopsy results.           Impression:      1. Successful ultrasound guided core needle biopsy of RIGHT flank  subcutaneous nodule.        This report was signed and finalized on 7/1/2024 2:36 PM by Dr Sonam Quach MD.       US Soft Tissue [[id number]] Collected: 07/01/24 1430     Updated: 07/02/24 0700    Narrative:      EXAM: US GUIDED TISSUE BIOPSY-      DATE: 7/1/2024 12:38 PM     HISTORY: Assess for metastatic disease; C79.51-Secondary malignant  neoplasm of bone; N28.89-Other specified disorders of kidney and ureter;  E27.8-Other specified disorders of adrenal gland; M79.89-Other specified  soft tissue disorders; N28.9-Disorder of kidney and ureter, unspecified;  N18.9-Chronic kidney disease, unspecified; D69.6-Thrombocytopenia,  unspecified; G83.4-Cauda equina syndrome; Z74.09-Other. Patient reports  history of breast cancer in 2013 status post surgery and radiation  therapy.        COMPARISON: 6/28/2024.     TECHNIQUE: Representative images and report stored to PACS per  institutional protocol and state regulations.     PROCEDURE:  Preprocedure imaging performed demonstrating multiple peripherally  echogenic, centrally hypoechoic nodules. These demonstrated internal  vascularity. A superficial nodule RIGHT flank nodule was selected and  patient positioned with consideration for her comfort.     Risks, benefits and alternatives to the procedure were discussed with  the patient. Specifically, risks of bleeding, infection,  allergy to  medicine, and non-diagnostic biopsy results were discussed with the  patient. Verbal and written informed consent was obtained.     A timeout was performed including the patient's name, date of birth,  biopsy site and laterality.     Using ultrasound, a site for biopsy of RIGHT flank subcutaneous nodule  was selected, marked and prepped in the usual sterile fashion. 1 percent   lidocaine was used for local anesthesia.     Using ultrasound guidance, RIGHT flank subcutaneous nodule biopsy was  performed using 18 gauge Bard core needle biopsy device. 5 passes were  made. One core was used for touch prep. 2 cores were placed in formalin  and 2 cores were placed in RPMI.     Cytology deemed sample adequate. Biopsy samples were taken by cytology.      The patient tolerated the procedure well. No evidence of complication.     The patient returned to the floor in stable condition and agrees to  follow up with referring clinician for biopsy results.           Impression:      1. Successful ultrasound guided core needle biopsy of RIGHT flank  subcutaneous nodule.        This report was signed and finalized on 7/1/2024 2:36 PM by Dr Sonam Quach MD.       MRI Brain Without Contrast [[id number]] Collected: 07/01/24 0949     Updated: 07/01/24 1000    Narrative:      EXAMINATION: MRI BRAIN WO CONTRAST-  7/1/2024 9:49 AM      HISTORY: Staging; C79.51-Secondary malignant neoplasm of bone;  N28.89-Other specified disorders of kidney and ureter; E27.8-Other  specified disorders of adrenal gland; M79.89-Other specified soft tissue  disorders; N28.9-Disorder of kidney and ureter, unspecified;  N18.9-Chronic kidney disease, unspecified; D69.6-Thrombocytopenia,  unspecified; G83.4-Cauda equina syndrome; Z74.09-Other reduced mobility     COMPARISON: 8/10/2023     TECHNIQUE: Multiplanar imaging of the brain was performed in a routine  fashion. No contrast was administered.     FINDINGS:  No restricted diffusion. No mass  effect or midline shift. It should be  noted that no intravenous contrast was administered, limiting evaluation  for intracranial metastatic disease. Within the limitations of this  exam, I don't see any definite evidence of intracranial metastatic  disease. Increased FLAIR signal intensity scattered throughout the  subcortical and periventricular white matter. Basilar cisterns are  preserved. Grey and white matter demonstrates normal MR signal. No  abnormal extra axial fluid collections are noted. No definite mass  effect or midline shift identified.     Proximal cervical spinal cord, brainstem, and cerebellum are  unremarkable. Normal cerebrovascular flow voids are seen. Bilateral  globes and orbits are normal in appearance.     Opacification of the LEFT sphenoid sinus with high T1 signal which may  represent some proteinaceous fluid.          Impression:         1.  No definite evidence of intracranial metastatic disease within the  limitations of this noncontrast examination.     2.  Fairly extensive periventricular white matter signal abnormality,  likely related to chronic microvascular ischemic change.     3.  Opacification of the LEFT sphenoid sinus with what is likely  proteinaceous fluid.                                                       This report was signed and finalized on 7/1/2024 9:53 AM by Dr. Armando Calixto MD.               I have reviewed the patient's current medications.     Assessment/Plan   Assessment  Active Hospital Problems    Diagnosis     **Left renal mass     Cauda equina compression     Metastatic cancer to spine        Treatment Plan  Lower back pain  Patient's low back pain has been getting worse in the last couple of weeks, was started on gabapentin outpatient but did not help.  MRI of the lumbar spine reported as follows-  IMPRESSION:   Neoplastic process involving the L3 vertebral body with associated  posterior soft tissue component resulting in severe spinal  canal  narrowing and cauda equina nerve root compression. The soft tissue  component appears to also extend into the right L3-L4 foramen. No  associated pathological fracture.  Additional multilevel spondylotic changes including moderate to severe  spinal canal and foraminal narrowing as detailed above.  Neurosurgery is on consult and did the following procedure-  Procedure(s):  LUMBAR LAMINECTOMY L3 WITH DECOMPRESSION 1-2 LEVELS-RIGHT     Decompression -  Lumbar laminectomy, medial facetectomy for decompression of the spinal cord  Open laminectomy and biopsy of epidural neoplasm  Use of intraoperative microscope      -Acute on chronic kidney disease stage IIIa  Patient follows up with a nephrologist outpatient, but he does not come to this hospital.  Nephrologist consulted and helping with management while patient is in-house.     -Left renal mass/right adrenal mass on CT of the abdomen/pelvis  Urology was consulted from the emergency room and after evaluation did not recommend any intervention,rather recommended oncology consult.  Patient has multiple subcutaneous nodules that may be amenable to percutaneous biopsy.  Oncology is on consult and has evaluated patient, will await for tentative diagnosis from lumbar spine biopsy before making recommendations      Other chronic medical conditions-  History of breast cancer  Hypercholesterolemia  Hypertension  Chronic sinusitis     DVT prophylaxis-heparin       Medical Decision Making  Number and Complexity of problems: High    Conditions and Status        Condition is unchanged.     MDM Data  External documents reviewed: No  Cardiac tracing (EKG, telemetry) interpretation: Yes  Radiology interpretation: Yes  Labs reviewed: Yes  Any tests that were considered but not ordered: No     Decision rules/scores evaluated (example [id number], Wells, etc): Yes     Discussed with: Patient     Care Planning  Shared decision making: Yes  Code status and discussions: Yes [full  code]    Disposition  Social Determinants of Health that impact treatment or disposition: No  I expect the patient to be discharged to unknown in unknown days.     Electronically signed by Alysia Lincoln MD, 07/02/24, 09:22 CDT.

## 2024-07-08 ENCOUNTER — TELEPHONE (OUTPATIENT)
Dept: FAMILY MEDICINE CLINIC | Age: 64
End: 2024-07-08

## 2024-07-08 RX ORDER — CELECOXIB 100 MG/1
100 CAPSULE ORAL 2 TIMES DAILY
Qty: 180 CAPSULE | Refills: 1 | Status: SHIPPED | OUTPATIENT
Start: 2024-07-08

## 2024-07-08 NOTE — TELEPHONE ENCOUNTER
Patient would like you to review labs and see if she would benefit from a vitamin B12 injection. She said her Sister gets one and was intrested in it. Thanks

## 2024-07-08 NOTE — TELEPHONE ENCOUNTER
Celebrex 200 mg 90 day supply    Please print this out. She mails this over [employer].    She also wanted to know if Joshua thought she would benefit from B12 injections. She said she had some recent blood work that he can review.        Health Maintenance   Topic Date Due    COVID-19 Vaccine (1) Never done    DTaP/Tdap/Td vaccine (1 - Tdap) Never done    Cervical cancer screen  Never done    Shingles vaccine (1 of 2) Never done    Breast cancer screen  05/10/2018    Respiratory Syncytial Virus (RSV) Pregnant or age 60 yrs+ (1 - 1-dose 60+ series) Never done    Colorectal Cancer Screen  04/05/2022    Flu vaccine (1) 08/01/2024    A1C test (Diabetic or Prediabetic)  10/11/2024    Depression Screen  10/11/2024    GFR test (Diabetes, CKD 3-4, OR last GFR 15-59)  06/24/2025    Lipids  06/24/2029    Hepatitis C screen  Addressed    HIV screen  Addressed    Hepatitis A vaccine  Aged Out    Hepatitis B vaccine  Aged Out    Hib vaccine  Aged Out    Polio vaccine  Aged Out    Meningococcal (ACWY) vaccine  Aged Out    Pneumococcal 0-64 years Vaccine  Aged Out             (applicable per patient's age: Cancer Screenings, Depression Screening, Fall Risk Screening, Immunizations)    Hemoglobin A1C (%)   Date Value   10/11/2023 5.9   10/27/2020 6.1   06/17/2019 6.1     AST (U/L)   Date Value   06/24/2024 18     ALT (U/L)   Date Value   06/24/2024 14     BUN (mg/dL)   Date Value   06/24/2024 22      (goal A1C is < 7)   (goal LDL is <100) need 30-50% reduction from baseline     BP Readings from Last 3 Encounters:   06/27/24 128/78   03/02/24 (!) 119/94   12/06/23 108/72    (goal /80)      All Future Testing planned in [facility]:  Lab Frequency Next Occurrence   TSH with Reflex Once 06/27/2025   CBC with Auto Differential Once 06/27/2025   Comprehensive Metabolic Panel Once 06/27/2025   Lipid Panel Once 06/27/2025   Magnesium Once 06/27/2025   Vitamin D 25 Hydroxy Once 06/27/2025   EKG 12 Lead Once 06/27/2025   XR CHEST (2 VW) Once

## 2024-07-08 NOTE — TELEPHONE ENCOUNTER
There is nothing on her blood work to indicate that she needs B12.  She is not anemic and her MCV is normal.  She is already taking oral B12, Metanx

## 2024-07-08 NOTE — TELEPHONE ENCOUNTER
Last OV: 12/6/2023  hip pain   Last RX:    Next scheduled apt: Visit date not found          Pt requesting a refill

## 2024-07-26 DIAGNOSIS — M79.7 FIBROMYALGIA: ICD-10-CM

## 2024-07-29 RX ORDER — PREGABALIN 150 MG/1
CAPSULE ORAL
Qty: 60 CAPSULE | Refills: 0 | Status: SHIPPED | OUTPATIENT
Start: 2024-07-29 | End: 2024-08-28

## 2024-07-29 NOTE — TELEPHONE ENCOUNTER
Rx refill request via "[employer]  Lyrica 150mg bid  Last ov for leg pain 10-11-23  Pt does not have f/u  (Joshua pt)

## 2024-07-30 DIAGNOSIS — I10 ESSENTIAL HYPERTENSION: ICD-10-CM

## 2024-07-30 RX ORDER — FUROSEMIDE 20 MG/1
20 TABLET ORAL DAILY
Qty: 90 TABLET | Refills: 0 | Status: SHIPPED | OUTPATIENT
Start: 2024-07-30

## 2024-07-30 NOTE — TELEPHONE ENCOUNTER
Rx refill request for Furosemide.  Last OV for lymphedema was 10-11-23  Patient does have f/u 8-6-24

## 2024-07-30 NOTE — TELEPHONE ENCOUNTER
30d rx sent, needs f/u - with lyrica being a controlled substance I'd recommend being seen q 6 mos.

## 2024-08-06 ENCOUNTER — OFFICE VISIT (OUTPATIENT)
Dept: FAMILY MEDICINE CLINIC | Age: 64
End: 2024-08-06
Payer: COMMERCIAL

## 2024-08-06 VITALS — OXYGEN SATURATION: 98 % | SYSTOLIC BLOOD PRESSURE: 124 MMHG | DIASTOLIC BLOOD PRESSURE: 70 MMHG | HEART RATE: 63 BPM

## 2024-08-06 DIAGNOSIS — M79.7 FIBROMYALGIA: Primary | ICD-10-CM

## 2024-08-06 DIAGNOSIS — G25.81 RESTLESS LEGS: ICD-10-CM

## 2024-08-06 PROCEDURE — G8417 CALC BMI ABV UP PARAM F/U: HCPCS | Performed by: NURSE PRACTITIONER

## 2024-08-06 PROCEDURE — G8427 DOCREV CUR MEDS BY ELIG CLIN: HCPCS | Performed by: NURSE PRACTITIONER

## 2024-08-06 PROCEDURE — 3017F COLORECTAL CA SCREEN DOC REV: CPT | Performed by: NURSE PRACTITIONER

## 2024-08-06 PROCEDURE — 1036F TOBACCO NON-USER: CPT | Performed by: NURSE PRACTITIONER

## 2024-08-06 PROCEDURE — 3078F DIAST BP <80 MM HG: CPT | Performed by: NURSE PRACTITIONER

## 2024-08-06 PROCEDURE — 99213 OFFICE O/P EST LOW 20 MIN: CPT | Performed by: NURSE PRACTITIONER

## 2024-08-06 PROCEDURE — 3074F SYST BP LT 130 MM HG: CPT | Performed by: NURSE PRACTITIONER

## 2024-08-06 ASSESSMENT — PATIENT HEALTH QUESTIONNAIRE - PHQ9
SUM OF ALL RESPONSES TO PHQ QUESTIONS 1-9: 0
SUM OF ALL RESPONSES TO PHQ QUESTIONS 1-9: 0
1. LITTLE INTEREST OR PLEASURE IN DOING THINGS: NOT AT ALL
SUM OF ALL RESPONSES TO PHQ9 QUESTIONS 1 & 2: 0
SUM OF ALL RESPONSES TO PHQ QUESTIONS 1-9: 0
2. FEELING DOWN, DEPRESSED OR HOPELESS: NOT AT ALL
SUM OF ALL RESPONSES TO PHQ QUESTIONS 1-9: 0

## 2024-08-06 ASSESSMENT — ENCOUNTER SYMPTOMS
SHORTNESS OF BREATH: 0
NAUSEA: 0
DIARRHEA: 0
VOMITING: 0
COUGH: 0

## 2024-08-06 NOTE — PROGRESS NOTES
HPI Notes    Name: Marifer Lee  : 1960         Chief Complaint:     Chief Complaint   Patient presents with    Fibromyalgia    Restless Leg(s)     Patient currently on Requip. Pt states medication is working well.       History of Present Illness:        HPI  Patient is a 64-year-old female who reports for follow-up for fibromyalgia and restless legs.  Patient was started on Requip.  Patient states that she is doing much better.  Pain is much more under control and tolerable.  Legs do not feel as achy as they did before  Past Medical History:     Past Medical History:   Diagnosis Date    Acid reflux     Bleeding tendency (HCC)     Blood type O+     Bronchitis     Chronic back pain     Depression     Diverticulitis     DVT (deep venous thrombosis) ([facility])     Left leg    Fibromyalgia     Hypertension     Internal hemorrhoid     Left thyroid nodule 2013    Nausea & vomiting     Pulmonary embolism ([facility])       Reviewed all health maintenance requirements and ordered appropriate tests  Health Maintenance Due   Topic Date Due    COVID-19 Vaccine (1) Never done    DTaP/Tdap/Td vaccine (1 - Tdap) Never done    Cervical cancer screen  Never done    Shingles vaccine (1 of 2) Never done    Breast cancer screen  05/10/2018    Respiratory Syncytial Virus (RSV) Pregnant or age 60 yrs+ (1 - 1-dose 60+ series) Never done    Colorectal Cancer Screen  2022    Flu vaccine (1) 2024       Past Surgical History:     Past Surgical History:   Procedure Laterality Date    BACK INJECTION Bilateral 2021    SACROILIAC JOINT INJECTION BILATERAL performed by Rodo Dominguez MD at [facility] OR    CHOLECYSTECTOMY  2013    laparoscopic    COLONOSCOPY      / Dr. Mathis    TOTAL KNEE ARTHROPLASTY Right     TOTAL KNEE ARTHROPLASTY Left         Medications:       Prior to Admission medications    Medication Sig Start Date End Date Taking? Authorizing Provider   furosemide (LASIX) 20 MG tablet Take 1 tablet by

## 2024-08-27 DIAGNOSIS — M79.7 FIBROMYALGIA: ICD-10-CM

## 2024-08-27 RX ORDER — PREGABALIN 150 MG/1
150 CAPSULE ORAL 2 TIMES DAILY
Qty: 60 CAPSULE | Refills: 0 | Status: CANCELLED | OUTPATIENT
Start: 2024-08-27 | End: 2024-09-26

## 2024-08-29 ENCOUNTER — PATIENT MESSAGE (OUTPATIENT)
Dept: FAMILY MEDICINE CLINIC | Age: 64
End: 2024-08-29

## 2024-08-29 RX ORDER — SPIRONOLACTONE 25 MG/1
25 TABLET ORAL 2 TIMES DAILY
Qty: 180 TABLET | Refills: 1 | Status: SHIPPED | OUTPATIENT
Start: 2024-08-29

## 2024-09-05 RX ORDER — SPIRONOLACTONE 25 MG/1
25 TABLET ORAL 2 TIMES DAILY
Qty: 180 TABLET | Refills: 1 | OUTPATIENT
Start: 2024-09-05

## 2024-09-10 DIAGNOSIS — M79.7 FIBROMYALGIA: ICD-10-CM

## 2024-09-10 RX ORDER — PREGABALIN 150 MG/1
300 CAPSULE ORAL 2 TIMES DAILY
Qty: 60 CAPSULE | Refills: 0 | Status: SHIPPED | OUTPATIENT
Start: 2024-09-10 | End: 2024-09-11 | Stop reason: SDUPTHER

## 2024-09-11 DIAGNOSIS — M79.7 FIBROMYALGIA: ICD-10-CM

## 2024-09-12 RX ORDER — PREGABALIN 150 MG/1
300 CAPSULE ORAL 2 TIMES DAILY
Qty: 120 CAPSULE | Refills: 0 | Status: SHIPPED | OUTPATIENT
Start: 2024-09-12 | End: 2024-10-12

## 2024-09-16 RX ORDER — FAMOTIDINE 20 MG/1
20 TABLET, FILM COATED ORAL 2 TIMES DAILY
Qty: 180 TABLET | Refills: 0 | Status: SHIPPED | OUTPATIENT
Start: 2024-09-16

## 2024-09-25 ENCOUNTER — PATIENT MESSAGE (OUTPATIENT)
Dept: FAMILY MEDICINE CLINIC | Age: 64
End: 2024-09-25

## 2024-09-25 DIAGNOSIS — R68.83 CHILLS: Primary | ICD-10-CM

## 2024-09-27 ENCOUNTER — HOSPITAL ENCOUNTER (OUTPATIENT)
Age: 64
Discharge: HOME | End: 2024-09-27
Payer: COMMERCIAL

## 2024-09-27 DIAGNOSIS — R68.83 CHILLS: ICD-10-CM

## 2024-09-27 LAB
-: ABNORMAL
BACTERIA URNS QL MICRO: ABNORMAL
BILIRUB UR QL STRIP: NEGATIVE
CLARITY UR: CLEAR
COLOR UR: YELLOW
COMMENT: ABNORMAL
EPI CELLS #/AREA URNS HPF: ABNORMAL /HPF
GLUCOSE UR STRIP-MCNC: NEGATIVE MG/DL
HGB UR QL STRIP.AUTO: ABNORMAL
KETONES UR STRIP-MCNC: NEGATIVE MG/DL
LEUKOCYTE ESTERASE UR QL STRIP: ABNORMAL
NITRITE UR QL STRIP: NEGATIVE
PH UR STRIP: 5 [PH] (ref 5–8)
PROT UR STRIP-MCNC: ABNORMAL MG/DL
RBC #/AREA URNS HPF: ABNORMAL /HPF (ref 0–2)
SP GR UR STRIP: 1.01 (ref 1–1.03)
UROBILINOGEN UR STRIP-ACNC: NORMAL EU/DL (ref 0–1)
WBC #/AREA URNS HPF: ABNORMAL /HPF

## 2024-09-27 PROCEDURE — 81001 URINALYSIS AUTO W/SCOPE: CPT

## 2024-09-27 PROCEDURE — 87086 URINE CULTURE/COLONY COUNT: CPT

## 2024-09-27 PROCEDURE — 87186 SC STD MICRODIL/AGAR DIL: CPT

## 2024-09-29 LAB
MICROORGANISM SPEC CULT: ABNORMAL
SERVICE CMNT-IMP: ABNORMAL
SPECIMEN DESCRIPTION: ABNORMAL

## 2024-09-30 LAB
MICROORGANISM SPEC CULT: ABNORMAL
SERVICE CMNT-IMP: ABNORMAL
SPECIMEN DESCRIPTION: ABNORMAL

## 2024-09-30 RX ORDER — NITROFURANTOIN 25; 75 MG/1; MG/1
100 CAPSULE ORAL 2 TIMES DAILY
Qty: 28 CAPSULE | Refills: 0 | Status: SHIPPED | OUTPATIENT
Start: 2024-09-30 | End: 2024-10-14

## 2024-09-30 NOTE — TELEPHONE ENCOUNTER
----- Message from Jimenez Malagon DNP sent at 9/30/2024  8:26 AM EDT -----  Please let pt know that her urine culture did show a UTI. I need to start her on nitrofurantoin 100mg PO BID x 7 days. Please pend.

## 2024-10-23 DIAGNOSIS — I10 ESSENTIAL HYPERTENSION: ICD-10-CM

## 2024-10-23 NOTE — TELEPHONE ENCOUNTER
Last OV: 8/6/2024 Fibromyalgia   Last RX:    Next scheduled apt: 2/4/2025   6 months f/u         Pt requesting a refill   Medication pending for approval

## 2024-10-24 RX ORDER — AMITRIPTYLINE HYDROCHLORIDE 100 MG/1
100 TABLET ORAL NIGHTLY PRN
Qty: 90 TABLET | Refills: 1 | Status: SHIPPED | OUTPATIENT
Start: 2024-10-24

## 2024-10-24 RX ORDER — FUROSEMIDE 20 MG/1
20 TABLET ORAL DAILY
Qty: 90 TABLET | Refills: 0 | Status: SHIPPED | OUTPATIENT
Start: 2024-10-24

## 2024-10-24 NOTE — TELEPHONE ENCOUNTER
Last OV  8/6/24    Next OV 2/4/25    Requesting refill on furosemide thru [facility]   Rx pending

## 2024-10-30 RX ORDER — METOPROLOL SUCCINATE 50 MG/1
50 TABLET, EXTENDED RELEASE ORAL DAILY
Qty: 90 TABLET | Refills: 3 | Status: SHIPPED | OUTPATIENT
Start: 2024-10-30

## 2024-10-31 DIAGNOSIS — M79.7 FIBROMYALGIA: ICD-10-CM

## 2024-10-31 RX ORDER — PREGABALIN 150 MG/1
300 CAPSULE ORAL 2 TIMES DAILY
Qty: 120 CAPSULE | Refills: 0 | Status: SHIPPED | OUTPATIENT
Start: 2024-10-31 | End: 2024-11-01 | Stop reason: DRUGHIGH

## 2024-10-31 NOTE — TELEPHONE ENCOUNTER
Rx refill request from pharmacy  Lyrica 150mg 2 caps bid  Metoprolol 5mg qd  Last OV- 8-6-24 for fibromyalgia  Next appt- 2-4-25

## 2024-11-01 DIAGNOSIS — M79.7 FIBROMYALGIA: ICD-10-CM

## 2024-11-01 RX ORDER — PREGABALIN 150 MG/1
150 CAPSULE ORAL 2 TIMES DAILY
Qty: 180 CAPSULE | Refills: 0 | Status: SHIPPED | OUTPATIENT
Start: 2024-11-01 | End: 2025-01-30

## 2024-11-11 NOTE — ED PROVIDER NOTES
[facility] ED  eMERGENCY dEPARTMENT eNCOUnter      Pt Name: Marifer Lee  MRN: [id number]  Birthdate 1960  Date of evaluation: 11/10/2024  Provider: Alex Carrera MD    CHIEF COMPLAINT     No chief complaint on file.    Patient is a 64-year-old female who presents to to the emergency department via EMS in full cardiac arrest.  Per her  she had been having chills and not feeling well all day.  He states he brought her blankets multiple times and she was still complaining of being chilled and cold.  He states that around 8:00 she got up to go to the bathroom and fell and then was very weak and he had to help her up and she had trouble maintaining her balance and then again around 9:00 fell forward over the edge of the tub and did not seem to be breathing.  He called EMS.  On arrival EMS states she was nonresponsive and not breathing.  When they put her on the monitor she was in asystole.  They state they began CPR and followed ACLS protocol until arrival to the ED roughly 50 minutes later.        Nursing Notes were reviewed.    REVIEW OF SYSTEMS    (2-9 systems for level 4, 10 or more for level 5)     Review of Systems    Except as noted above the remainder of the review of systems was reviewed and negative.       PAST MEDICAL HISTORY     Past Medical History:   Diagnosis Date    Acid reflux     Bleeding tendency ([facility])     Blood type O+     Bronchitis     Chronic back pain     Depression     Diverticulitis     DVT (deep venous thrombosis) ([facility]) 2012    Left leg    Fibromyalgia     Hypertension     Internal hemorrhoid     Left thyroid nodule 7/25/2013    Nausea & vomiting     Pulmonary embolism ([facility])          SURGICAL HISTORY       Past Surgical History:   Procedure Laterality Date    BACK INJECTION Bilateral 6/4/2021    SACROILIAC JOINT INJECTION BILATERAL performed by Rodo Dominguez MD at [facility] OR    CHOLECYSTECTOMY  08/22/2013    laparoscopic    COLONOSCOPY      2012/ Dr. Mathis    TOTAL KNEE

## 2024-11-11 NOTE — ED NOTES
PT's  taking 5 rings and 1 pair of earrings home.  
Patient's Name: Marifer Lee   Patient's YOB: 1960  MRN Number: [id number]    Patient was examined and the following were absent:  Apical and Radial Pulses  Blood Pressure  Respiratory Effort    This information was relayed to ED physician on duty at time of death.    
denies

## (undated) DEVICE — NEEDLE,22GX1.5",REG,BEVEL: Brand: MEDLINE

## (undated) DEVICE — CANNULA NSL AD TUBE L7FT END TDL CO2 SAMP STD CONN DISP

## (undated) DEVICE — Z DISCONTINUED APPLICATOR SURG 10.5 CC CHLORAPREP

## (undated) DEVICE — GLOVE ORANGE PI 7   MSG9070

## (undated) DEVICE — NEEDLE SPINAL 22GA L3.5IN SPINOCAN

## (undated) DEVICE — BANDAGE ADH W0.75XL3IN PD W0.75XL7/8IN ZOO DSGN CHAR CURITY

## (undated) DEVICE — SYRINGE, LUER LOCK, 10ML: Brand: MEDLINE

## (undated) DEVICE — CUSTOM PACK: Brand: UNBRANDED

## (undated) DEVICE — TOWEL,OR,DSP,ST,BLUE,STD,4/PK,20PK/CS: Brand: MEDLINE